# Patient Record
Sex: FEMALE | Race: WHITE | NOT HISPANIC OR LATINO | Employment: FULL TIME | URBAN - METROPOLITAN AREA
[De-identification: names, ages, dates, MRNs, and addresses within clinical notes are randomized per-mention and may not be internally consistent; named-entity substitution may affect disease eponyms.]

---

## 2022-04-08 LAB — PAP RECOMMENDATION EXT: NORMAL

## 2022-10-31 ENCOUNTER — HOSPITAL ENCOUNTER (OUTPATIENT)
Dept: RADIOLOGY | Facility: HOSPITAL | Age: 27
Discharge: HOME | End: 2022-10-31
Attending: NURSE PRACTITIONER
Payer: COMMERCIAL

## 2022-10-31 ENCOUNTER — OFFICE VISIT (OUTPATIENT)
Dept: FAMILY MEDICINE | Facility: CLINIC | Age: 27
End: 2022-10-31
Payer: COMMERCIAL

## 2022-10-31 VITALS
HEIGHT: 62 IN | SYSTOLIC BLOOD PRESSURE: 113 MMHG | TEMPERATURE: 98 F | HEART RATE: 87 BPM | DIASTOLIC BLOOD PRESSURE: 76 MMHG | BODY MASS INDEX: 39.78 KG/M2 | WEIGHT: 216.19 LBS

## 2022-10-31 DIAGNOSIS — Z00.00 ANNUAL PHYSICAL EXAM: Primary | ICD-10-CM

## 2022-10-31 DIAGNOSIS — E28.2 PCOS (POLYCYSTIC OVARIAN SYNDROME): ICD-10-CM

## 2022-10-31 DIAGNOSIS — Z11.4 ENCOUNTER FOR SCREENING FOR HIV: ICD-10-CM

## 2022-10-31 DIAGNOSIS — E01.0 THYROMEGALY: ICD-10-CM

## 2022-10-31 DIAGNOSIS — Z11.59 NEED FOR HEPATITIS C SCREENING TEST: ICD-10-CM

## 2022-10-31 PROCEDURE — 3074F SYST BP LT 130 MM HG: CPT | Mod: CPTII,S$GLB,, | Performed by: NURSE PRACTITIONER

## 2022-10-31 PROCEDURE — 76536 US EXAM OF HEAD AND NECK: CPT | Mod: TC,PO

## 2022-10-31 PROCEDURE — 1160F PR REVIEW ALL MEDS BY PRESCRIBER/CLIN PHARMACIST DOCUMENTED: ICD-10-PCS | Mod: CPTII,S$GLB,, | Performed by: NURSE PRACTITIONER

## 2022-10-31 PROCEDURE — 99999 PR PBB SHADOW E&M-NEW PATIENT-LVL IV: ICD-10-PCS | Mod: PBBFAC,,, | Performed by: NURSE PRACTITIONER

## 2022-10-31 PROCEDURE — 3074F PR MOST RECENT SYSTOLIC BLOOD PRESSURE < 130 MM HG: ICD-10-PCS | Mod: CPTII,S$GLB,, | Performed by: NURSE PRACTITIONER

## 2022-10-31 PROCEDURE — 99999 PR PBB SHADOW E&M-NEW PATIENT-LVL IV: CPT | Mod: PBBFAC,,, | Performed by: NURSE PRACTITIONER

## 2022-10-31 PROCEDURE — 76536 US SOFT TISSUE HEAD NECK THYROID: ICD-10-PCS | Mod: 26,,, | Performed by: RADIOLOGY

## 2022-10-31 PROCEDURE — 1160F RVW MEDS BY RX/DR IN RCRD: CPT | Mod: CPTII,S$GLB,, | Performed by: NURSE PRACTITIONER

## 2022-10-31 PROCEDURE — 99385 PREV VISIT NEW AGE 18-39: CPT | Mod: S$GLB,,, | Performed by: NURSE PRACTITIONER

## 2022-10-31 PROCEDURE — 99385 PR PREVENTIVE VISIT,NEW,18-39: ICD-10-PCS | Mod: S$GLB,,, | Performed by: NURSE PRACTITIONER

## 2022-10-31 PROCEDURE — 3078F DIAST BP <80 MM HG: CPT | Mod: CPTII,S$GLB,, | Performed by: NURSE PRACTITIONER

## 2022-10-31 PROCEDURE — 1159F MED LIST DOCD IN RCRD: CPT | Mod: CPTII,S$GLB,, | Performed by: NURSE PRACTITIONER

## 2022-10-31 PROCEDURE — 1159F PR MEDICATION LIST DOCUMENTED IN MEDICAL RECORD: ICD-10-PCS | Mod: CPTII,S$GLB,, | Performed by: NURSE PRACTITIONER

## 2022-10-31 PROCEDURE — 76536 US EXAM OF HEAD AND NECK: CPT | Mod: 26,,, | Performed by: RADIOLOGY

## 2022-10-31 PROCEDURE — 3078F PR MOST RECENT DIASTOLIC BLOOD PRESSURE < 80 MM HG: ICD-10-PCS | Mod: CPTII,S$GLB,, | Performed by: NURSE PRACTITIONER

## 2022-10-31 RX ORDER — NORETHINDRONE ACETATE AND ETHINYL ESTRADIOL AND FERROUS FUMARATE 1MG-20(24)
1 KIT ORAL DAILY
COMMUNITY

## 2022-10-31 RX ORDER — LEVOCETIRIZINE DIHYDROCHLORIDE 5 MG/1
TABLET, FILM COATED ORAL
COMMUNITY
End: 2023-03-20 | Stop reason: SDUPTHER

## 2022-10-31 NOTE — PATIENT INSTRUCTIONS
[facility] est care visit with Dr. Jaquez  [facility] well woman exam with ob/gyn  RTC as needed

## 2022-10-31 NOTE — PROGRESS NOTES
"Subjective:       Patient ID: Alyson Vila is a 26 y.o. female.    Chief Complaint: Annual Exam  Pt in today for annual exam, est care. Pt states sees ob/gyn for well woman exams; states diagnosed with PCOS; also states checking for insulin resistance. Labs due; request thyroid testing; states, "I'm pretty sure I have hypothyroidism. I'd like a thyroid panel, not just the TSH." Pt declines influenza vaccine. Pt has no other complaints today.  Past Medical History:   Diagnosis Date    PCOS (polycystic ovarian syndrome)      Social History     Socioeconomic History    Marital status: Single   Tobacco Use    Smoking status: Never   Substance and Sexual Activity    Alcohol use: Not Currently    Drug use: Not Currently    Sexual activity: Yes     Partners: Male     History reviewed. No pertinent surgical history.'  HPI  Review of Systems   Constitutional: Negative.    HENT: Negative.     Eyes: Negative.    Respiratory: Negative.     Cardiovascular: Negative.    Gastrointestinal: Negative.    Endocrine: Negative.    Genitourinary: Negative.    Musculoskeletal: Negative.    Integumentary:  Negative.   Allergic/Immunologic: Negative.    Neurological: Negative.    Psychiatric/Behavioral: Negative.         Objective:      Physical Exam  Vitals and nursing note reviewed.   Constitutional:       Appearance: Normal appearance.   HENT:      Head: Normocephalic.      Right Ear: Tympanic membrane, ear canal and external ear normal.      Left Ear: Tympanic membrane, ear canal and external ear normal.      Nose: Nose normal.      Mouth/Throat:      Mouth: Mucous membranes are moist.      Pharynx: Oropharynx is clear.   Eyes:      Conjunctiva/sclera: Conjunctivae normal.      Pupils: Pupils are equal, round, and reactive to light.   Neck:      Thyroid: Thyromegaly present. No thyroid mass or thyroid tenderness.   Cardiovascular:      Rate and Rhythm: Normal rate and regular rhythm.      Pulses: Normal pulses.      Heart sounds: " Normal heart sounds.   Pulmonary:      Effort: Pulmonary effort is normal.      Breath sounds: Normal breath sounds.   Abdominal:      General: Bowel sounds are normal.      Palpations: Abdomen is soft.   Musculoskeletal:         General: Normal range of motion.      Cervical back: Normal range of motion and neck supple.   Skin:     General: Skin is warm and dry.      Capillary Refill: Capillary refill takes 2 to 3 seconds.   Neurological:      Mental Status: She is alert and oriented to person, place, and time.   Psychiatric:         Mood and Affect: Mood normal.         Behavior: Behavior normal.         Thought Content: Thought content normal.         Judgment: Judgment normal.       Assessment:       Problem List Items Addressed This Visit    None  Visit Diagnoses       Annual physical exam    -  Primary    Relevant Orders    TSH    T4, FREE    CBC Without Differential    Comprehensive Metabolic Panel    Lipid Panel    Hemoglobin A1C    PCOS (polycystic ovarian syndrome)        Relevant Orders    Hemoglobin A1C    Need for hepatitis C screening test        Relevant Orders    Hepatitis C Antibody    Thyromegaly        Relevant Orders    TSH    T4, FREE    T3    Encounter for screening for HIV        Relevant Orders    HIV 1/2 Ag/Ab (4th Gen)              Plan:           Alyson SANTOS was seen today for annual exam.    Diagnoses and all orders for this visit:    Annual physical exam  PCOS (polycystic ovarian syndrome)  Thyromegaly  Need for hepatitis C screening test  Encounter for screening for HIV  -     Hepatitis C Antibody; Future  -     TSH; Future  -     T4, FREE; Future  -     CBC Without Differential; Future  -     Comprehensive Metabolic Panel; Future  -     Lipid Panel; Future  -     Hemoglobin A1C; Future        -     T3; Future  -     US Soft Tissue Head Neck Thyroid; Future        -     HIV 1/2 Ag/Ab (4th Gen); Future  [facility] est care visit with Dr. Gisele Bernal well woman exam with ob/gyn  RTC as  needed

## 2022-11-02 ENCOUNTER — TELEPHONE (OUTPATIENT)
Dept: FAMILY MEDICINE | Facility: CLINIC | Age: 27
End: 2022-11-02
Payer: COMMERCIAL

## 2022-11-02 DIAGNOSIS — E04.1 THYROID NODULE: ICD-10-CM

## 2022-11-02 DIAGNOSIS — R93.89 THYROID WITH HETEROGENEOUS ECHOTEXTURE DETERMINED BY ULTRASOUND: Primary | ICD-10-CM

## 2022-11-02 NOTE — TELEPHONE ENCOUNTER
----- Message from Trinidad Snowden NP sent at 11/1/2022  3:29 PM CDT -----  Reviewed; heterogeneity of thyroid noted, which can indicate thyroid disease; awaiting thyroid lab results; consider endocrinology. Nodules noted to left and right lobes; per radiology, none meet FNA criteria. Recommend monitoring yearly, if not underlying disease noted.

## 2022-11-04 ENCOUNTER — LAB VISIT (OUTPATIENT)
Dept: LAB | Facility: HOSPITAL | Age: 27
End: 2022-11-04
Attending: NURSE PRACTITIONER
Payer: COMMERCIAL

## 2022-11-04 ENCOUNTER — OFFICE VISIT (OUTPATIENT)
Dept: FAMILY MEDICINE | Facility: CLINIC | Age: 27
End: 2022-11-04
Payer: COMMERCIAL

## 2022-11-04 VITALS
HEART RATE: 70 BPM | SYSTOLIC BLOOD PRESSURE: 125 MMHG | TEMPERATURE: 97 F | HEIGHT: 62 IN | DIASTOLIC BLOOD PRESSURE: 80 MMHG | WEIGHT: 215.06 LBS | BODY MASS INDEX: 39.58 KG/M2

## 2022-11-04 DIAGNOSIS — E28.2 PCOS (POLYCYSTIC OVARIAN SYNDROME): Primary | ICD-10-CM

## 2022-11-04 DIAGNOSIS — E28.2 PCOS (POLYCYSTIC OVARIAN SYNDROME): ICD-10-CM

## 2022-11-04 DIAGNOSIS — Z00.00 ANNUAL PHYSICAL EXAM: ICD-10-CM

## 2022-11-04 DIAGNOSIS — Z11.4 ENCOUNTER FOR SCREENING FOR HIV: ICD-10-CM

## 2022-11-04 DIAGNOSIS — E01.0 THYROMEGALY: ICD-10-CM

## 2022-11-04 DIAGNOSIS — Z11.59 NEED FOR HEPATITIS C SCREENING TEST: ICD-10-CM

## 2022-11-04 DIAGNOSIS — E04.2 MULTIPLE THYROID NODULES: ICD-10-CM

## 2022-11-04 LAB
ALBUMIN SERPL BCP-MCNC: 3.5 G/DL (ref 3.5–5.2)
ALP SERPL-CCNC: 53 U/L (ref 55–135)
ALT SERPL W/O P-5'-P-CCNC: 22 U/L (ref 10–44)
ANION GAP SERPL CALC-SCNC: 9 MMOL/L (ref 8–16)
AST SERPL-CCNC: 20 U/L (ref 10–40)
BILIRUB SERPL-MCNC: 0.5 MG/DL (ref 0.1–1)
BUN SERPL-MCNC: 11 MG/DL (ref 6–20)
CALCIUM SERPL-MCNC: 9.6 MG/DL (ref 8.7–10.5)
CHLORIDE SERPL-SCNC: 105 MMOL/L (ref 95–110)
CHOLEST SERPL-MCNC: 189 MG/DL (ref 120–199)
CHOLEST/HDLC SERPL: 3 {RATIO} (ref 2–5)
CO2 SERPL-SCNC: 23 MMOL/L (ref 23–29)
CREAT SERPL-MCNC: 0.9 MG/DL (ref 0.5–1.4)
ERYTHROCYTE [DISTWIDTH] IN BLOOD BY AUTOMATED COUNT: 12 % (ref 11.5–14.5)
EST. GFR  (NO RACE VARIABLE): >60 ML/MIN/1.73 M^2
ESTIMATED AVG GLUCOSE: 108 MG/DL (ref 68–131)
GLUCOSE SERPL-MCNC: 89 MG/DL (ref 70–110)
HBA1C MFR BLD: 5.4 % (ref 4–5.6)
HCT VFR BLD AUTO: 40.2 % (ref 37–48.5)
HCV AB SERPL QL IA: NORMAL
HDLC SERPL-MCNC: 62 MG/DL (ref 40–75)
HDLC SERPL: 32.8 % (ref 20–50)
HGB BLD-MCNC: 13.1 G/DL (ref 12–16)
HIV 1+2 AB+HIV1 P24 AG SERPL QL IA: NORMAL
INSULIN COLLECTION INTERVAL: NORMAL
INSULIN SERPL-ACNC: 11.8 UU/ML
LDLC SERPL CALC-MCNC: 109.6 MG/DL (ref 63–159)
MCH RBC QN AUTO: 31.3 PG (ref 27–31)
MCHC RBC AUTO-ENTMCNC: 32.6 G/DL (ref 32–36)
MCV RBC AUTO: 96 FL (ref 82–98)
NONHDLC SERPL-MCNC: 127 MG/DL
PLATELET # BLD AUTO: 312 K/UL (ref 150–450)
PMV BLD AUTO: 10.8 FL (ref 9.2–12.9)
POTASSIUM SERPL-SCNC: 4.4 MMOL/L (ref 3.5–5.1)
PROT SERPL-MCNC: 7.3 G/DL (ref 6–8.4)
RBC # BLD AUTO: 4.19 M/UL (ref 4–5.4)
SODIUM SERPL-SCNC: 137 MMOL/L (ref 136–145)
T3 SERPL-MCNC: 169 NG/DL (ref 60–180)
T4 FREE SERPL-MCNC: 0.92 NG/DL (ref 0.71–1.51)
TRIGL SERPL-MCNC: 87 MG/DL (ref 30–150)
TSH SERPL DL<=0.005 MIU/L-ACNC: 0.66 UIU/ML (ref 0.4–4)
WBC # BLD AUTO: 5.74 K/UL (ref 3.9–12.7)

## 2022-11-04 PROCEDURE — 87389 HIV-1 AG W/HIV-1&-2 AB AG IA: CPT | Performed by: NURSE PRACTITIONER

## 2022-11-04 PROCEDURE — 86803 HEPATITIS C AB TEST: CPT | Performed by: NURSE PRACTITIONER

## 2022-11-04 PROCEDURE — 83036 HEMOGLOBIN GLYCOSYLATED A1C: CPT | Performed by: NURSE PRACTITIONER

## 2022-11-04 PROCEDURE — 1159F MED LIST DOCD IN RCRD: CPT | Mod: CPTII,S$GLB,, | Performed by: INTERNAL MEDICINE

## 2022-11-04 PROCEDURE — 3079F PR MOST RECENT DIASTOLIC BLOOD PRESSURE 80-89 MM HG: ICD-10-PCS | Mod: CPTII,S$GLB,, | Performed by: INTERNAL MEDICINE

## 2022-11-04 PROCEDURE — 84480 ASSAY TRIIODOTHYRONINE (T3): CPT | Performed by: NURSE PRACTITIONER

## 2022-11-04 PROCEDURE — 3074F SYST BP LT 130 MM HG: CPT | Mod: CPTII,S$GLB,, | Performed by: INTERNAL MEDICINE

## 2022-11-04 PROCEDURE — 3008F PR BODY MASS INDEX (BMI) DOCUMENTED: ICD-10-PCS | Mod: CPTII,S$GLB,, | Performed by: INTERNAL MEDICINE

## 2022-11-04 PROCEDURE — 99999 PR PBB SHADOW E&M-EST. PATIENT-LVL III: ICD-10-PCS | Mod: PBBFAC,,, | Performed by: INTERNAL MEDICINE

## 2022-11-04 PROCEDURE — 80061 LIPID PANEL: CPT | Performed by: NURSE PRACTITIONER

## 2022-11-04 PROCEDURE — 80053 COMPREHEN METABOLIC PANEL: CPT | Performed by: NURSE PRACTITIONER

## 2022-11-04 PROCEDURE — 3079F DIAST BP 80-89 MM HG: CPT | Mod: CPTII,S$GLB,, | Performed by: INTERNAL MEDICINE

## 2022-11-04 PROCEDURE — 99999 PR PBB SHADOW E&M-EST. PATIENT-LVL III: CPT | Mod: PBBFAC,,, | Performed by: INTERNAL MEDICINE

## 2022-11-04 PROCEDURE — 3008F BODY MASS INDEX DOCD: CPT | Mod: CPTII,S$GLB,, | Performed by: INTERNAL MEDICINE

## 2022-11-04 PROCEDURE — 84443 ASSAY THYROID STIM HORMONE: CPT | Performed by: NURSE PRACTITIONER

## 2022-11-04 PROCEDURE — 83525 ASSAY OF INSULIN: CPT | Performed by: INTERNAL MEDICINE

## 2022-11-04 PROCEDURE — 3074F PR MOST RECENT SYSTOLIC BLOOD PRESSURE < 130 MM HG: ICD-10-PCS | Mod: CPTII,S$GLB,, | Performed by: INTERNAL MEDICINE

## 2022-11-04 PROCEDURE — 1159F PR MEDICATION LIST DOCUMENTED IN MEDICAL RECORD: ICD-10-PCS | Mod: CPTII,S$GLB,, | Performed by: INTERNAL MEDICINE

## 2022-11-04 PROCEDURE — 99213 OFFICE O/P EST LOW 20 MIN: CPT | Mod: S$GLB,,, | Performed by: INTERNAL MEDICINE

## 2022-11-04 PROCEDURE — 99213 PR OFFICE/OUTPT VISIT, EST, LEVL III, 20-29 MIN: ICD-10-PCS | Mod: S$GLB,,, | Performed by: INTERNAL MEDICINE

## 2022-11-04 PROCEDURE — 84439 ASSAY OF FREE THYROXINE: CPT | Performed by: NURSE PRACTITIONER

## 2022-11-04 PROCEDURE — 85027 COMPLETE CBC AUTOMATED: CPT | Performed by: NURSE PRACTITIONER

## 2022-11-04 PROCEDURE — 36415 COLL VENOUS BLD VENIPUNCTURE: CPT | Mod: PO | Performed by: NURSE PRACTITIONER

## 2022-11-04 NOTE — PROGRESS NOTES
Assessment/Plan:    Problem List Items Addressed This Visit          Endocrine    Multiple thyroid nodules    Overview     -US Nov 2022- multiple nodules, none meeting FNA criteria  -thyroid studies pending         PCOS (polycystic ovarian syndrome) - Primary    Overview     -followed by GYN  -currently on OCP         Relevant Orders    INSULIN, RANDOM         Irasema Jaquez MD  _____________________________________________________________________________________________________________________________________________________    CC: establish care    HPI:    Patient is in clinic today as a new patient to me. She is a 25 yo F with a PMH of PCOS and newly diagnosed thyroid nodules.    Recently had annual exam done by one of my partners. Patient was interested in being checked for insulin resistance and thyroid disease. She has a diagnosis of PCOS. She has labs scheduled for today. She did have a thyroid US performed and found to have multiple thyroid nodules but none meeting biopsy criteria.    No other new complaints today.  Remaining chronic conditions have been reviewed and remain stable. Further detail as stated above.     HM reviewed. DENISE sent for pap.    No recent changes to medical/surgical history.    Current Outpatient Medications on File Prior to Visit   Medication Sig Dispense Refill    levocetirizine (XYZAL) 5 MG tablet levocetirizine 5 mg tablet      norethindrone-e.estradioL-iron (MINASTRIN 24 FE) 1 mg-20 mcg(24) /75 mg (4) Chew Take 1 tablet by mouth once daily.       No current facility-administered medications on file prior to visit.       Review of Systems   Constitutional:  Negative for chills, diaphoresis, fatigue and fever.   HENT:  Negative for congestion, ear pain, postnasal drip, sinus pain and sore throat.    Eyes:  Negative for pain and redness.   Respiratory:  Negative for cough, chest tightness and shortness of breath.    Cardiovascular:  Negative for chest pain and leg swelling.  "  Gastrointestinal:  Negative for abdominal pain, constipation, diarrhea, nausea and vomiting.   Genitourinary:  Negative for dysuria and hematuria.   Musculoskeletal:  Negative for arthralgias and joint swelling.   Skin:  Negative for rash.   Neurological:  Negative for dizziness, syncope and headaches.   Psychiatric/Behavioral:  Negative for dysphoric mood. The patient is not nervous/anxious.      Vitals:    11/04/22 1011   BP: 125/80   Pulse: 70   Temp: 97.3 °F (36.3 °C)   TempSrc: Oral   Weight: 97.6 kg (215 lb 0.9 oz)   Height: 5' 2" (1.575 m)       Wt Readings from Last 3 Encounters:   11/04/22 97.6 kg (215 lb 0.9 oz)   10/31/22 98.1 kg (216 lb 2.6 oz)   07/11/13 73.9 kg (163 lb) (92 %, Z= 1.42)*     * Growth percentiles are based on CDC (Girls, 2-20 Years) data.       Physical Exam  Constitutional:       General: She is not in acute distress.     Appearance: Normal appearance. She is well-developed.   HENT:      Head: Normocephalic and atraumatic.   Eyes:      Conjunctiva/sclera: Conjunctivae normal.   Cardiovascular:      Rate and Rhythm: Normal rate and regular rhythm.      Pulses: Normal pulses.      Heart sounds: Normal heart sounds. No murmur heard.  Pulmonary:      Effort: Pulmonary effort is normal. No respiratory distress.      Breath sounds: Normal breath sounds.   Abdominal:      General: Bowel sounds are normal. There is no distension.      Palpations: Abdomen is soft.      Tenderness: There is no abdominal tenderness.   Musculoskeletal:         General: Normal range of motion.      Cervical back: Normal range of motion and neck supple.   Skin:     General: Skin is warm and dry.      Findings: No rash.   Neurological:      General: No focal deficit present.      Mental Status: She is alert and oriented to person, place, and time.   Psychiatric:         Mood and Affect: Mood normal.         Behavior: Behavior normal.     Health Maintenance   Topic Date Due    Hepatitis C Screening  Never done    Pap " Smear  Never done    TETANUS VACCINE  06/07/2031    Lipid Panel  Completed    HPV Vaccines  Completed

## 2022-11-07 NOTE — PROGRESS NOTES
Results have been released via [email or web address]. Please verify that these have been viewed by patient. If not, please call patient with results.    I have reviewed your recent results.    A1C NORMAL-The A1c is normal. Your insulin level is normal as well. No evidence of prediabetes or diabetes.     CBC NORMAL-The CBC appears to be stable at this time with no sign of major anemia, abnormal white count or platelet abnormality.    CMP/BMP NORMAL-The electrolytes all appear stable at this time.  This includes kidney functions along with routine electrolytes like sugar, potassium and sodium.  If it was a CMP test, the liver enzymes were noted to be stable also.    HEPATITIS C SCREEN NORMAL-The routine screening for Hepatitis C was negative.    HIV SCREEN NORMAL-The screening for HIV is negative.    LIPID NORMAL SCREEN-The cholesterol panel screening showed levels that are considered at target at this time.  Recheck each year.     TSH NORMAL-The TSH screening indicated a normal function of the thyroid.    Please let me know if you have any additional questions or concerns about above.

## 2022-11-09 ENCOUNTER — PATIENT OUTREACH (OUTPATIENT)
Dept: ADMINISTRATIVE | Facility: HOSPITAL | Age: 27
End: 2022-11-09
Payer: COMMERCIAL

## 2022-11-10 ENCOUNTER — OFFICE VISIT (OUTPATIENT)
Dept: ENDOCRINOLOGY | Facility: CLINIC | Age: 27
End: 2022-11-10
Payer: COMMERCIAL

## 2022-11-10 ENCOUNTER — LAB VISIT (OUTPATIENT)
Dept: LAB | Facility: HOSPITAL | Age: 27
End: 2022-11-10
Attending: INTERNAL MEDICINE
Payer: COMMERCIAL

## 2022-11-10 VITALS
HEIGHT: 62 IN | SYSTOLIC BLOOD PRESSURE: 118 MMHG | BODY MASS INDEX: 39.07 KG/M2 | WEIGHT: 212.31 LBS | HEART RATE: 78 BPM | DIASTOLIC BLOOD PRESSURE: 76 MMHG | OXYGEN SATURATION: 99 %

## 2022-11-10 DIAGNOSIS — E28.2 PCOS (POLYCYSTIC OVARIAN SYNDROME): ICD-10-CM

## 2022-11-10 DIAGNOSIS — E04.1 THYROID NODULE: ICD-10-CM

## 2022-11-10 DIAGNOSIS — R93.89 THYROID WITH HETEROGENEOUS ECHOTEXTURE DETERMINED BY ULTRASOUND: ICD-10-CM

## 2022-11-10 DIAGNOSIS — R93.89 THYROID WITH HETEROGENEOUS ECHOTEXTURE DETERMINED BY ULTRASOUND: Primary | ICD-10-CM

## 2022-11-10 DIAGNOSIS — E04.2 MULTIPLE THYROID NODULES: ICD-10-CM

## 2022-11-10 LAB
GLUCOSE SERPL-MCNC: 115 MG/DL
GLUCOSE SERPL-MCNC: 218 MG/DL
GLUCOSE SERPL-MCNC: 97 MG/DL (ref 70–110)
THYROPEROXIDASE IGG SERPL-ACNC: 733.8 IU/ML

## 2022-11-10 PROCEDURE — 99999 PR PBB SHADOW E&M-EST. PATIENT-LVL III: CPT | Mod: PBBFAC,,, | Performed by: INTERNAL MEDICINE

## 2022-11-10 PROCEDURE — 3044F HG A1C LEVEL LT 7.0%: CPT | Mod: CPTII,S$GLB,, | Performed by: INTERNAL MEDICINE

## 2022-11-10 PROCEDURE — 1160F PR REVIEW ALL MEDS BY PRESCRIBER/CLIN PHARMACIST DOCUMENTED: ICD-10-PCS | Mod: CPTII,S$GLB,, | Performed by: INTERNAL MEDICINE

## 2022-11-10 PROCEDURE — 3078F DIAST BP <80 MM HG: CPT | Mod: CPTII,S$GLB,, | Performed by: INTERNAL MEDICINE

## 2022-11-10 PROCEDURE — 99204 OFFICE O/P NEW MOD 45 MIN: CPT | Mod: S$GLB,,, | Performed by: INTERNAL MEDICINE

## 2022-11-10 PROCEDURE — 1160F RVW MEDS BY RX/DR IN RCRD: CPT | Mod: CPTII,S$GLB,, | Performed by: INTERNAL MEDICINE

## 2022-11-10 PROCEDURE — 86376 MICROSOMAL ANTIBODY EACH: CPT | Performed by: INTERNAL MEDICINE

## 2022-11-10 PROCEDURE — 99999 PR PBB SHADOW E&M-EST. PATIENT-LVL III: ICD-10-PCS | Mod: PBBFAC,,, | Performed by: INTERNAL MEDICINE

## 2022-11-10 PROCEDURE — 3078F PR MOST RECENT DIASTOLIC BLOOD PRESSURE < 80 MM HG: ICD-10-PCS | Mod: CPTII,S$GLB,, | Performed by: INTERNAL MEDICINE

## 2022-11-10 PROCEDURE — 3074F PR MOST RECENT SYSTOLIC BLOOD PRESSURE < 130 MM HG: ICD-10-PCS | Mod: CPTII,S$GLB,, | Performed by: INTERNAL MEDICINE

## 2022-11-10 PROCEDURE — 3074F SYST BP LT 130 MM HG: CPT | Mod: CPTII,S$GLB,, | Performed by: INTERNAL MEDICINE

## 2022-11-10 PROCEDURE — 3008F PR BODY MASS INDEX (BMI) DOCUMENTED: ICD-10-PCS | Mod: CPTII,S$GLB,, | Performed by: INTERNAL MEDICINE

## 2022-11-10 PROCEDURE — 82951 GLUCOSE TOLERANCE TEST (GTT): CPT | Performed by: INTERNAL MEDICINE

## 2022-11-10 PROCEDURE — 3008F BODY MASS INDEX DOCD: CPT | Mod: CPTII,S$GLB,, | Performed by: INTERNAL MEDICINE

## 2022-11-10 PROCEDURE — 99204 PR OFFICE/OUTPT VISIT, NEW, LEVL IV, 45-59 MIN: ICD-10-PCS | Mod: S$GLB,,, | Performed by: INTERNAL MEDICINE

## 2022-11-10 PROCEDURE — 3044F PR MOST RECENT HEMOGLOBIN A1C LEVEL <7.0%: ICD-10-PCS | Mod: CPTII,S$GLB,, | Performed by: INTERNAL MEDICINE

## 2022-11-10 PROCEDURE — 1159F MED LIST DOCD IN RCRD: CPT | Mod: CPTII,S$GLB,, | Performed by: INTERNAL MEDICINE

## 2022-11-10 PROCEDURE — 36415 COLL VENOUS BLD VENIPUNCTURE: CPT | Performed by: INTERNAL MEDICINE

## 2022-11-10 PROCEDURE — 1159F PR MEDICATION LIST DOCUMENTED IN MEDICAL RECORD: ICD-10-PCS | Mod: CPTII,S$GLB,, | Performed by: INTERNAL MEDICINE

## 2022-11-10 NOTE — ASSESSMENT & PLAN NOTE
Labs and imaging not available and now on OCP  Will obtain OGTT to evaluate for need for metformin although she reports improvement in insulin resistance following weight loss in last year  No other hyperandrogenism symptoms present

## 2022-11-10 NOTE — ASSESSMENT & PLAN NOTE
US reviewed and with heterogeneous texture and few subcentimeter nodules  Will check TPO antibody although TSH normal. Discussed implications of +TPO and need for monitoring for development of hypothyroidism  Update US 1 year and if stable may not need further imaging for some time

## 2022-11-10 NOTE — PROGRESS NOTES
Alyson Vila is a 26 y.o. female  referred by Triindad Snowden for evaluation of thyroid nodule    History of Present Illness  Thyroid nodule(s)  Found when she presented with concerns regarding weight loss. Thyroid function normal, US as below  No antibodies have been done    Thyroid US 10/2022:  There is diffuse heterogeneity of the thyroid gland.  The isthmus measures 4 mm in thickness.  The right lobe of the thyroid gland measures 5.3 cm in length.  The left lobe of the thyroid gland measures 5.1 cm in length.     Tiny 3 mm cystic nodule seen within the midpole of the right lobe.  There is also a 6 mm isoechoic solid nodule within the lower pole of the right lobe.  The left lobe of the thyroid gland demonstrates a 9 mm isoechoic nodule within the lower pole.     Impression:     1. Diffuse heterogeneity of the thyroid gland.  2. No nodules meeting criteria for FNA or follow-up per TI rads criteria       Risk Factors for Thyroid Cancer:  Hx of External Beam Radiation:denies  Family Hx of Thyroid Cancer:denies    Denies rapid neck enlargement, difficulty swallowing, SOB, or hoarseness.     There is no known disorder of thyroid function.  Recent TSH:    Lab Results   Component Value Date    TSH 0.657 11/04/2022     Has diagnosis of PCOS made after sister diagnosed. She blood work and US done and told consistent. I do not have this to review today    Cycles have been painful and inconsistent in duration. Maybe skipped one month a time but not more than that  She started OCP last year after diagnosis and great improvement.  Denies acne, hirsutism    Also told she has insulin resistance although weight loss in last year led to some improvement  Has not had OGTT  Metformin discussed but not started    JEAN CARLOS-IR 2.6 on labs 11/2022    Last year has been working to lose weight and lost 20 pounds with diet and exercise      Current Outpatient Medications:     levocetirizine (XYZAL) 5 MG tablet, levocetirizine 5 mg  tablet, Disp: , Rfl:     norethindrone-e.estradioL-iron (MINASTRIN 24 FE) 1 mg-20 mcg(24) /75 mg (4) Chew, Take 1 tablet by mouth once daily., Disp: , Rfl:     ROS as above    Objective:     Vitals:    11/10/22 1101   BP: 118/76   Pulse: 78     Wt Readings from Last 3 Encounters:   11/10/22 96.3 kg (212 lb 4.9 oz)   11/04/22 97.6 kg (215 lb 0.9 oz)   10/31/22 98.1 kg (216 lb 2.6 oz)     Body mass index is 38.83 kg/m².  Physical Exam  Constitutional:       Appearance: She is well-developed.   HENT:      Head: Normocephalic.   Eyes:      Conjunctiva/sclera: Conjunctivae normal.   Neck:      Comments: Mild thyromegaly  Pulmonary:      Effort: Pulmonary effort is normal.   Musculoskeletal:         General: Normal range of motion.   Skin:     General: Skin is warm.      Findings: No rash.      Comments: No hirsutism  No acanthosis  No acne   Neurological:      Mental Status: She is alert and oriented to person, place, and time.       Labs    Chemistry        Component Value Date/Time     11/04/2022 1045    K 4.4 11/04/2022 1045     11/04/2022 1045    CO2 23 11/04/2022 1045    BUN 11 11/04/2022 1045    CREATININE 0.9 11/04/2022 1045    GLU 89 11/04/2022 1045        Component Value Date/Time    CALCIUM 9.6 11/04/2022 1045    ALKPHOS 53 (L) 11/04/2022 1045    AST 20 11/04/2022 1045    ALT 22 11/04/2022 1045    BILITOT 0.5 11/04/2022 1045              Assessment and Plan     Multiple thyroid nodules  US reviewed and with heterogeneous texture and few subcentimeter nodules  Will check TPO antibody although TSH normal. Discussed implications of +TPO and need for monitoring for development of hypothyroidism  Update US 1 year and if stable may not need further imaging for some time    PCOS (polycystic ovarian syndrome)  Labs and imaging not available and now on OCP  Will obtain OGTT to evaluate for need for metformin although she reports improvement in insulin resistance following weight loss in last year  No other  hyperandrogenism symptoms present    BMI 38.0-38.9,adult  Cont efforts at weight loss as she is doing  Evaluation of insulin resistance as above        RTC 1 year with US        Clover Navarrete MD

## 2022-11-15 ENCOUNTER — PATIENT MESSAGE (OUTPATIENT)
Dept: ENDOCRINOLOGY | Facility: CLINIC | Age: 27
End: 2022-11-15
Payer: COMMERCIAL

## 2022-11-15 RX ORDER — METFORMIN HYDROCHLORIDE 500 MG/1
1000 TABLET, EXTENDED RELEASE ORAL 2 TIMES DAILY WITH MEALS
Qty: 360 TABLET | Refills: 3 | Status: SHIPPED | OUTPATIENT
Start: 2022-11-15 | End: 2023-03-10

## 2023-01-13 ENCOUNTER — HOSPITAL ENCOUNTER (OUTPATIENT)
Dept: RADIOLOGY | Facility: HOSPITAL | Age: 28
Discharge: HOME | End: 2023-01-13
Attending: NURSE PRACTITIONER
Payer: COMMERCIAL

## 2023-01-13 ENCOUNTER — OFFICE VISIT (OUTPATIENT)
Dept: FAMILY MEDICINE | Facility: CLINIC | Age: 28
End: 2023-01-13
Payer: COMMERCIAL

## 2023-01-13 VITALS
SYSTOLIC BLOOD PRESSURE: 120 MMHG | RESPIRATION RATE: 20 BRPM | DIASTOLIC BLOOD PRESSURE: 81 MMHG | WEIGHT: 214.94 LBS | OXYGEN SATURATION: 99 % | TEMPERATURE: 99 F | HEIGHT: 62 IN | HEART RATE: 87 BPM | BODY MASS INDEX: 39.56 KG/M2

## 2023-01-13 DIAGNOSIS — R06.2 WHEEZING: ICD-10-CM

## 2023-01-13 DIAGNOSIS — J06.9 UPPER RESPIRATORY TRACT INFECTION, UNSPECIFIED TYPE: Primary | ICD-10-CM

## 2023-01-13 DIAGNOSIS — R05.9 COUGH, UNSPECIFIED TYPE: ICD-10-CM

## 2023-01-13 DIAGNOSIS — R09.81 NASAL CONGESTION: ICD-10-CM

## 2023-01-13 LAB
CTP QC/QA: YES
CTP QC/QA: YES
FLUAV AG NPH QL: NEGATIVE
FLUBV AG NPH QL: NEGATIVE
SARS-COV-2 RDRP RESP QL NAA+PROBE: NEGATIVE

## 2023-01-13 PROCEDURE — 87635: ICD-10-PCS | Mod: QW,S$GLB,, | Performed by: NURSE PRACTITIONER

## 2023-01-13 PROCEDURE — 99999 PR PBB SHADOW E&M-EST. PATIENT-LVL V: CPT | Mod: PBBFAC,,, | Performed by: NURSE PRACTITIONER

## 2023-01-13 PROCEDURE — 1160F PR REVIEW ALL MEDS BY PRESCRIBER/CLIN PHARMACIST DOCUMENTED: ICD-10-PCS | Mod: CPTII,S$GLB,, | Performed by: NURSE PRACTITIONER

## 2023-01-13 PROCEDURE — 87635 SARS-COV-2 COVID-19 AMP PRB: CPT | Mod: QW,S$GLB,, | Performed by: NURSE PRACTITIONER

## 2023-01-13 PROCEDURE — 1159F MED LIST DOCD IN RCRD: CPT | Mod: CPTII,S$GLB,, | Performed by: NURSE PRACTITIONER

## 2023-01-13 PROCEDURE — 3074F PR MOST RECENT SYSTOLIC BLOOD PRESSURE < 130 MM HG: ICD-10-PCS | Mod: CPTII,S$GLB,, | Performed by: NURSE PRACTITIONER

## 2023-01-13 PROCEDURE — 99999 PR PBB SHADOW E&M-EST. PATIENT-LVL V: ICD-10-PCS | Mod: PBBFAC,,, | Performed by: NURSE PRACTITIONER

## 2023-01-13 PROCEDURE — 3008F BODY MASS INDEX DOCD: CPT | Mod: CPTII,S$GLB,, | Performed by: NURSE PRACTITIONER

## 2023-01-13 PROCEDURE — 99213 OFFICE O/P EST LOW 20 MIN: CPT | Mod: S$GLB,,, | Performed by: NURSE PRACTITIONER

## 2023-01-13 PROCEDURE — 3079F DIAST BP 80-89 MM HG: CPT | Mod: CPTII,S$GLB,, | Performed by: NURSE PRACTITIONER

## 2023-01-13 PROCEDURE — 3074F SYST BP LT 130 MM HG: CPT | Mod: CPTII,S$GLB,, | Performed by: NURSE PRACTITIONER

## 2023-01-13 PROCEDURE — 87804 POCT INFLUENZA A/B: ICD-10-PCS | Mod: 59,QW,S$GLB, | Performed by: NURSE PRACTITIONER

## 2023-01-13 PROCEDURE — 71046 X-RAY EXAM CHEST 2 VIEWS: CPT | Mod: 26,,, | Performed by: RADIOLOGY

## 2023-01-13 PROCEDURE — 71046 X-RAY EXAM CHEST 2 VIEWS: CPT | Mod: TC,PO

## 2023-01-13 PROCEDURE — 1159F PR MEDICATION LIST DOCUMENTED IN MEDICAL RECORD: ICD-10-PCS | Mod: CPTII,S$GLB,, | Performed by: NURSE PRACTITIONER

## 2023-01-13 PROCEDURE — 3008F PR BODY MASS INDEX (BMI) DOCUMENTED: ICD-10-PCS | Mod: CPTII,S$GLB,, | Performed by: NURSE PRACTITIONER

## 2023-01-13 PROCEDURE — 1160F RVW MEDS BY RX/DR IN RCRD: CPT | Mod: CPTII,S$GLB,, | Performed by: NURSE PRACTITIONER

## 2023-01-13 PROCEDURE — 71046 XR CHEST PA AND LATERAL: ICD-10-PCS | Mod: 26,,, | Performed by: RADIOLOGY

## 2023-01-13 PROCEDURE — 99213 PR OFFICE/OUTPT VISIT, EST, LEVL III, 20-29 MIN: ICD-10-PCS | Mod: S$GLB,,, | Performed by: NURSE PRACTITIONER

## 2023-01-13 PROCEDURE — 87804 INFLUENZA ASSAY W/OPTIC: CPT | Mod: 59,QW,S$GLB, | Performed by: NURSE PRACTITIONER

## 2023-01-13 PROCEDURE — 3079F PR MOST RECENT DIASTOLIC BLOOD PRESSURE 80-89 MM HG: ICD-10-PCS | Mod: CPTII,S$GLB,, | Performed by: NURSE PRACTITIONER

## 2023-01-13 RX ORDER — ALBUTEROL SULFATE 90 UG/1
2 AEROSOL, METERED RESPIRATORY (INHALATION) EVERY 6 HOURS PRN
Qty: 18 G | Refills: 0 | Status: SHIPPED | OUTPATIENT
Start: 2023-01-13 | End: 2023-06-13

## 2023-01-13 RX ORDER — AZITHROMYCIN 250 MG/1
TABLET, FILM COATED ORAL
Qty: 6 TABLET | Refills: 0 | Status: CANCELLED | OUTPATIENT
Start: 2023-01-13 | End: 2023-01-18

## 2023-01-13 RX ORDER — METHYLPREDNISOLONE 4 MG/1
TABLET ORAL
Qty: 21 TABLET | Refills: 0 | Status: CANCELLED | OUTPATIENT
Start: 2023-01-13 | End: 2023-02-03

## 2023-01-13 RX ORDER — AZITHROMYCIN 250 MG/1
TABLET, FILM COATED ORAL
Qty: 6 TABLET | Refills: 0 | Status: SHIPPED | OUTPATIENT
Start: 2023-01-13 | End: 2023-01-18

## 2023-01-13 RX ORDER — PROMETHAZINE HYDROCHLORIDE AND DEXTROMETHORPHAN HYDROBROMIDE 6.25; 15 MG/5ML; MG/5ML
5 SYRUP ORAL 2 TIMES DAILY PRN
Qty: 118 ML | Refills: 0 | Status: SHIPPED | OUTPATIENT
Start: 2023-01-13 | End: 2023-01-23

## 2023-01-13 RX ORDER — METHYLPREDNISOLONE 4 MG/1
TABLET ORAL
Qty: 21 TABLET | Refills: 0 | Status: SHIPPED | OUTPATIENT
Start: 2023-01-13 | End: 2023-02-03

## 2023-01-13 NOTE — PATIENT INSTRUCTIONS
Hydrate well  Rest  Tylenol OTC as directed  Albuterol can cause jitteriness, palpitations  Cough medication causes drowsiness  Report to ER immediately if symptoms worsen or persist

## 2023-01-13 NOTE — PROGRESS NOTES
Subjective:       Patient ID: Alyson Vila is a 27 y.o. female.    Chief Complaint: Cough, Nasal Congestion, and Fatigue    Cough  This is a new problem. The current episode started in the past 7 days. The problem has been gradually worsening. The problem occurs every few minutes. The cough is Productive of purulent sputum. Associated symptoms include a fever, nasal congestion, postnasal drip, rhinorrhea and wheezing. Pertinent negatives include no chest pain, chills, ear congestion, ear pain, headaches, heartburn, hemoptysis, myalgias, rash, sore throat, shortness of breath, sweats or weight loss. The symptoms are aggravated by exercise. Risk factors for lung disease include animal exposure and occupational exposure. She has tried OTC cough suppressant (sudafed, mucinex d; states has OTC nasal spray, whihc helps with nasal congestion) for the symptoms. The treatment provided no relief. There is no history of asthma, bronchiectasis, bronchitis, COPD, emphysema, environmental allergies or pneumonia.   Past Medical History:   Diagnosis Date    PCOS (polycystic ovarian syndrome)      Social History     Socioeconomic History    Marital status: Single   Tobacco Use    Smoking status: Never   Substance and Sexual Activity    Alcohol use: Yes     Comment: Socially only. Maybe 2x per month    Drug use: Never    Sexual activity: Yes     Partners: Male     Birth control/protection: Condom, OCP     No past surgical history on file.    Review of Systems   Constitutional:  Positive for fever. Negative for chills and weight loss.   HENT:  Positive for postnasal drip, rhinorrhea and sinus pressure/congestion. Negative for ear pain and sore throat.    Eyes: Negative.    Respiratory:  Positive for cough and wheezing. Negative for hemoptysis and shortness of breath.    Cardiovascular: Negative.  Negative for chest pain.   Gastrointestinal: Negative.  Negative for heartburn.   Endocrine: Negative.    Genitourinary: Negative.     Musculoskeletal: Negative.  Negative for myalgias.   Integumentary:  Negative for rash. Negative.   Allergic/Immunologic: Negative.  Negative for environmental allergies.   Neurological: Negative.  Negative for headaches.   Psychiatric/Behavioral: Negative.         Objective:      Physical Exam  Vitals and nursing note reviewed.   Constitutional:       Appearance: Normal appearance.   HENT:      Head: Normocephalic.      Right Ear: Tympanic membrane, ear canal and external ear normal.      Left Ear: Tympanic membrane, ear canal and external ear normal.      Nose: Congestion and rhinorrhea present.      Mouth/Throat:      Mouth: Mucous membranes are moist.      Pharynx: Oropharynx is clear.   Eyes:      Conjunctiva/sclera: Conjunctivae normal.      Pupils: Pupils are equal, round, and reactive to light.   Cardiovascular:      Rate and Rhythm: Normal rate and regular rhythm.      Pulses: Normal pulses.      Heart sounds: Normal heart sounds.   Pulmonary:      Effort: Pulmonary effort is normal.      Breath sounds: Normal breath sounds.   Abdominal:      General: Bowel sounds are normal.      Palpations: Abdomen is soft.   Musculoskeletal:         General: Normal range of motion.      Cervical back: Normal range of motion and neck supple.   Skin:     General: Skin is warm and dry.      Capillary Refill: Capillary refill takes 2 to 3 seconds.   Neurological:      Mental Status: She is alert and oriented to person, place, and time.   Psychiatric:         Mood and Affect: Mood normal.         Behavior: Behavior normal.         Thought Content: Thought content normal.         Judgment: Judgment normal.       Assessment:       Problem List Items Addressed This Visit    None  Visit Diagnoses       Upper respiratory tract infection, unspecified type    -  Primary    Cough, unspecified type        Relevant Orders    POCT COVID-19 Rapid Screening    POCT Influenza A/B    Nasal congestion                  Plan:            Alyson was seen today for cough, nasal congestion and fatigue.    Diagnoses and all orders for this visit:    Upper respiratory tract infection, unspecified type  Cough, unspecified type  Nasal congestion  Wheezing  -     POCT COVID-19 Rapid Screening  -     POCT Influenza A/B  -     X-Ray Chest PA And Lateral; Future        -     promethazine-dextromethorphan (PROMETHAZINE-DM) 6.25-15 mg/5 mL Syrp; Take 5 mLs by mouth 2 (two) times daily as needed.  -     albuterol (PROVENTIL/VENTOLIN HFA) 90 mcg/actuation inhaler; Inhale 2 puffs into the lungs every 6 (six) hours as needed for Wheezing or Shortness of Breath.  -     azithromycin (Z-DUSTY) 250 MG tablet; Take 2 tablets by mouth on day 1; Take 1 tablet by mouth on days 2-5  -     methylPREDNISolone (MEDROL DOSEPACK) 4 mg tablet; use as directed  Hydrate well  Rest  Tylenol OTC as directed  Report to ER immediately if symptoms worsen or persist

## 2023-03-09 ENCOUNTER — PATIENT MESSAGE (OUTPATIENT)
Dept: ENDOCRINOLOGY | Facility: CLINIC | Age: 28
End: 2023-03-09
Payer: COMMERCIAL

## 2023-03-09 DIAGNOSIS — R73.02 IGT (IMPAIRED GLUCOSE TOLERANCE): Primary | ICD-10-CM

## 2023-03-10 ENCOUNTER — PATIENT MESSAGE (OUTPATIENT)
Dept: ENDOCRINOLOGY | Facility: CLINIC | Age: 28
End: 2023-03-10
Payer: COMMERCIAL

## 2023-03-10 RX ORDER — SEMAGLUTIDE 1.34 MG/ML
0.5 INJECTION, SOLUTION SUBCUTANEOUS
Qty: 1 PEN | Refills: 11 | Status: SHIPPED | OUTPATIENT
Start: 2023-03-10 | End: 2023-04-27

## 2023-03-20 RX ORDER — LEVOCETIRIZINE DIHYDROCHLORIDE 5 MG/1
TABLET, FILM COATED ORAL
Qty: 90 TABLET | Refills: 0 | Status: SHIPPED | OUTPATIENT
Start: 2023-03-20 | End: 2023-04-10 | Stop reason: SDUPTHER

## 2023-03-20 NOTE — TELEPHONE ENCOUNTER
----- Message from Lynn Hector sent at 3/20/2023  1:32 PM CDT -----  Pt is requesting a refill for levocetirizine (XYZAL) 5 MG tablet. Call back number is [phone]    [id number] - [place] [address] - [address] AT [facility] [address]  [address]  [place] [address]  Phone: [phone] Fax: [phone]

## 2023-03-20 NOTE — TELEPHONE ENCOUNTER
No new care gaps identified.  [facility] Embedded Care Gaps. Reference number: [id number]. 3/20/2023   1:49:39 PM CDT

## 2023-04-10 NOTE — TELEPHONE ENCOUNTER
No new care gaps identified.  [facility] Embedded Care Gaps. Reference number: [id number]. 4/10/2023   4:42:57 PM CDT

## 2023-04-12 RX ORDER — LEVOCETIRIZINE DIHYDROCHLORIDE 5 MG/1
5 TABLET, FILM COATED ORAL DAILY
Qty: 90 TABLET | Refills: 0 | Status: SHIPPED | OUTPATIENT
Start: 2023-04-12 | End: 2023-10-31

## 2023-04-26 ENCOUNTER — PATIENT MESSAGE (OUTPATIENT)
Dept: ENDOCRINOLOGY | Facility: CLINIC | Age: 28
End: 2023-04-26
Payer: COMMERCIAL

## 2023-04-26 DIAGNOSIS — R73.02 IGT (IMPAIRED GLUCOSE TOLERANCE): ICD-10-CM

## 2023-04-27 RX ORDER — SEMAGLUTIDE 0.68 MG/ML
0.5 INJECTION, SOLUTION SUBCUTANEOUS
Qty: 3 ML | Refills: 11 | Status: SHIPPED | OUTPATIENT
Start: 2023-04-27 | End: 2023-06-05

## 2023-06-05 RX ORDER — SEMAGLUTIDE 1.34 MG/ML
1 INJECTION, SOLUTION SUBCUTANEOUS
Qty: 3 ML | Refills: 11 | Status: SHIPPED | OUTPATIENT
Start: 2023-06-05 | End: 2023-10-02 | Stop reason: SDUPTHER

## 2023-06-05 RX ORDER — SEMAGLUTIDE 0.68 MG/ML
0.5 INJECTION, SOLUTION SUBCUTANEOUS
Qty: 3 ML | Refills: 11 | Status: CANCELLED | OUTPATIENT
Start: 2023-06-05

## 2023-06-09 ENCOUNTER — OFFICE VISIT (OUTPATIENT)
Dept: FAMILY MEDICINE | Facility: CLINIC | Age: 28
End: 2023-06-09
Payer: COMMERCIAL

## 2023-06-09 VITALS
HEART RATE: 71 BPM | DIASTOLIC BLOOD PRESSURE: 79 MMHG | TEMPERATURE: 98 F | SYSTOLIC BLOOD PRESSURE: 114 MMHG | WEIGHT: 216.31 LBS | OXYGEN SATURATION: 99 % | BODY MASS INDEX: 39.56 KG/M2

## 2023-06-09 DIAGNOSIS — J32.9 SINUSITIS, UNSPECIFIED CHRONICITY, UNSPECIFIED LOCATION: Primary | ICD-10-CM

## 2023-06-09 DIAGNOSIS — R09.81 NASAL CONGESTION: ICD-10-CM

## 2023-06-09 PROCEDURE — 3078F PR MOST RECENT DIASTOLIC BLOOD PRESSURE < 80 MM HG: ICD-10-PCS | Mod: CPTII,S$GLB,, | Performed by: NURSE PRACTITIONER

## 2023-06-09 PROCEDURE — 99999 PR PBB SHADOW E&M-EST. PATIENT-LVL IV: ICD-10-PCS | Mod: PBBFAC,,, | Performed by: NURSE PRACTITIONER

## 2023-06-09 PROCEDURE — 3074F PR MOST RECENT SYSTOLIC BLOOD PRESSURE < 130 MM HG: ICD-10-PCS | Mod: CPTII,S$GLB,, | Performed by: NURSE PRACTITIONER

## 2023-06-09 PROCEDURE — 99213 PR OFFICE/OUTPT VISIT, EST, LEVL III, 20-29 MIN: ICD-10-PCS | Mod: 25,S$GLB,, | Performed by: NURSE PRACTITIONER

## 2023-06-09 PROCEDURE — 1160F RVW MEDS BY RX/DR IN RCRD: CPT | Mod: CPTII,S$GLB,, | Performed by: NURSE PRACTITIONER

## 2023-06-09 PROCEDURE — 96372 THER/PROPH/DIAG INJ SC/IM: CPT | Mod: S$GLB,,, | Performed by: NURSE PRACTITIONER

## 2023-06-09 PROCEDURE — 1159F MED LIST DOCD IN RCRD: CPT | Mod: CPTII,S$GLB,, | Performed by: NURSE PRACTITIONER

## 2023-06-09 PROCEDURE — 3008F PR BODY MASS INDEX (BMI) DOCUMENTED: ICD-10-PCS | Mod: CPTII,S$GLB,, | Performed by: NURSE PRACTITIONER

## 2023-06-09 PROCEDURE — 96372 PR INJECTION,THERAP/PROPH/DIAG2ST, IM OR SUBCUT: ICD-10-PCS | Mod: S$GLB,,, | Performed by: NURSE PRACTITIONER

## 2023-06-09 PROCEDURE — 3074F SYST BP LT 130 MM HG: CPT | Mod: CPTII,S$GLB,, | Performed by: NURSE PRACTITIONER

## 2023-06-09 PROCEDURE — 3078F DIAST BP <80 MM HG: CPT | Mod: CPTII,S$GLB,, | Performed by: NURSE PRACTITIONER

## 2023-06-09 PROCEDURE — 99999 PR PBB SHADOW E&M-EST. PATIENT-LVL IV: CPT | Mod: PBBFAC,,, | Performed by: NURSE PRACTITIONER

## 2023-06-09 PROCEDURE — 3008F BODY MASS INDEX DOCD: CPT | Mod: CPTII,S$GLB,, | Performed by: NURSE PRACTITIONER

## 2023-06-09 PROCEDURE — 1159F PR MEDICATION LIST DOCUMENTED IN MEDICAL RECORD: ICD-10-PCS | Mod: CPTII,S$GLB,, | Performed by: NURSE PRACTITIONER

## 2023-06-09 PROCEDURE — 1160F PR REVIEW ALL MEDS BY PRESCRIBER/CLIN PHARMACIST DOCUMENTED: ICD-10-PCS | Mod: CPTII,S$GLB,, | Performed by: NURSE PRACTITIONER

## 2023-06-09 PROCEDURE — 99213 OFFICE O/P EST LOW 20 MIN: CPT | Mod: 25,S$GLB,, | Performed by: NURSE PRACTITIONER

## 2023-06-09 RX ORDER — AZELASTINE 1 MG/ML
1 SPRAY, METERED NASAL 2 TIMES DAILY
Qty: 30 ML | Refills: 0 | Status: SHIPPED | OUTPATIENT
Start: 2023-06-09 | End: 2023-06-16

## 2023-06-09 RX ORDER — CEPHALEXIN 500 MG/1
500 CAPSULE ORAL EVERY 12 HOURS
Qty: 20 CAPSULE | Refills: 0 | Status: SHIPPED | OUTPATIENT
Start: 2023-06-09 | End: 2023-06-13

## 2023-06-09 RX ORDER — DEXAMETHASONE SODIUM PHOSPHATE 4 MG/ML
8 INJECTION, SOLUTION INTRA-ARTICULAR; INTRALESIONAL; INTRAMUSCULAR; INTRAVENOUS; SOFT TISSUE ONCE
Status: COMPLETED | OUTPATIENT
Start: 2023-06-09 | End: 2023-06-09

## 2023-06-09 RX ADMIN — DEXAMETHASONE SODIUM PHOSPHATE 8 MG: 4 INJECTION, SOLUTION INTRA-ARTICULAR; INTRALESIONAL; INTRAMUSCULAR; INTRAVENOUS; SOFT TISSUE at 10:06

## 2023-06-09 NOTE — PATIENT INSTRUCTIONS
Hydrate well  Rest  Tylenol OTC as directed  Warm salt water gargles PRN  Report to ER immediately symptoms worsen or persist

## 2023-06-09 NOTE — PROGRESS NOTES
Subjective     Patient ID: Alyson Vila is a 27 y.o. female.    Chief Complaint: Sore Throat    Sore Throat   This is a new problem. The current episode started in the past 7 days. The problem has been unchanged. The pain is worse on the right side. There has been no fever. The pain is at a severity of 3/10. The pain is mild. Associated symptoms include congestion and headaches. Pertinent negatives include no abdominal pain, coughing, diarrhea, drooling, ear discharge, ear pain, hoarse voice, plugged ear sensation, neck pain, shortness of breath, stridor, swollen glands, trouble swallowing or vomiting. She has had no exposure to strep or mono. She has tried NSAIDs for the symptoms. The treatment provided mild relief.   Past Medical History:   Diagnosis Date    PCOS (polycystic ovarian syndrome)      Social History     Socioeconomic History    Marital status: Single   Tobacco Use    Smoking status: Never   Substance and Sexual Activity    Alcohol use: Yes     Comment: Socially only. Maybe 2x per month    Drug use: Never    Sexual activity: Yes     Partners: Male     Birth control/protection: Condom, OCP     History reviewed. No pertinent surgical history.    Review of Systems   Constitutional: Negative.    HENT:  Positive for nasal congestion, postnasal drip, rhinorrhea, sinus pressure/congestion and sore throat (states resolved). Negative for drooling, ear discharge, ear pain, hoarse voice and trouble swallowing.    Eyes: Negative.    Respiratory: Negative.  Negative for cough, shortness of breath and stridor.    Cardiovascular: Negative.    Gastrointestinal: Negative.  Negative for abdominal pain, diarrhea and vomiting.   Endocrine: Negative.    Genitourinary: Negative.    Musculoskeletal: Negative.  Negative for neck pain.   Integumentary:  Negative.   Allergic/Immunologic: Positive for environmental allergies.   Neurological:  Positive for headaches.   Psychiatric/Behavioral: Negative.          Objective      Physical Exam  Vitals and nursing note reviewed.   Constitutional:       Appearance: Normal appearance.   HENT:      Head: Normocephalic.      Right Ear: Tympanic membrane, ear canal and external ear normal.      Left Ear: Tympanic membrane, ear canal and external ear normal.      Nose: Congestion and rhinorrhea present.      Right Sinus: Maxillary sinus tenderness present. No frontal sinus tenderness.      Left Sinus: Maxillary sinus tenderness present. No frontal sinus tenderness.      Mouth/Throat:      Mouth: Mucous membranes are moist.      Pharynx: Oropharynx is clear.   Eyes:      Conjunctiva/sclera: Conjunctivae normal.      Pupils: Pupils are equal, round, and reactive to light.   Cardiovascular:      Rate and Rhythm: Normal rate and regular rhythm.      Pulses: Normal pulses.      Heart sounds: Normal heart sounds.   Pulmonary:      Effort: Pulmonary effort is normal.      Breath sounds: Normal breath sounds.   Abdominal:      General: Bowel sounds are normal.      Palpations: Abdomen is soft.   Musculoskeletal:         General: Normal range of motion.      Cervical back: Normal range of motion and neck supple.   Skin:     General: Skin is warm and dry.      Capillary Refill: Capillary refill takes 2 to 3 seconds.   Neurological:      Mental Status: She is alert and oriented to person, place, and time.   Psychiatric:         Mood and Affect: Mood normal.         Behavior: Behavior normal.         Thought Content: Thought content normal.         Judgment: Judgment normal.          Assessment and Plan     1. Sinusitis, unspecified chronicity, unspecified location  2. Nasal congestion         -     dexAMETHasone injection 8 mg  -     cephALEXin (KEFLEX) 500 MG capsule; Take 1 capsule (500 mg total) by mouth every 12 (twelve) hours. for 10 days  Dispense: 20 capsule; Refill: 0  -     azelastine (ASTELIN) 137 mcg (0.1 %) nasal spray; 1 spray (137 mcg total) by Nasal route 2 (two) times daily. for 7 days   Dispense: 30 mL; Refill: 0  Hydrate well  Rest  Tylenol OTC as directed  Warm salt water gargles PRN  Report to ER immediately symptoms worsen or persist               No follow-ups on file.

## 2023-06-13 ENCOUNTER — OFFICE VISIT (OUTPATIENT)
Dept: PRIMARY CARE CLINIC | Facility: CLINIC | Age: 28
End: 2023-06-13
Payer: COMMERCIAL

## 2023-06-13 ENCOUNTER — PATIENT MESSAGE (OUTPATIENT)
Dept: FAMILY MEDICINE | Facility: CLINIC | Age: 28
End: 2023-06-13
Payer: COMMERCIAL

## 2023-06-13 DIAGNOSIS — F41.1 GAD (GENERALIZED ANXIETY DISORDER): ICD-10-CM

## 2023-06-13 DIAGNOSIS — E66.01 SEVERE OBESITY (BMI 35.0-39.9) WITH COMORBIDITY: Primary | ICD-10-CM

## 2023-06-13 DIAGNOSIS — F33.0 MILD EPISODE OF RECURRENT MAJOR DEPRESSIVE DISORDER: ICD-10-CM

## 2023-06-13 PROCEDURE — 1159F MED LIST DOCD IN RCRD: CPT | Mod: CPTII,95,, | Performed by: INTERNAL MEDICINE

## 2023-06-13 PROCEDURE — 1160F PR REVIEW ALL MEDS BY PRESCRIBER/CLIN PHARMACIST DOCUMENTED: ICD-10-PCS | Mod: CPTII,95,, | Performed by: INTERNAL MEDICINE

## 2023-06-13 PROCEDURE — 1160F RVW MEDS BY RX/DR IN RCRD: CPT | Mod: CPTII,95,, | Performed by: INTERNAL MEDICINE

## 2023-06-13 PROCEDURE — 99213 OFFICE O/P EST LOW 20 MIN: CPT | Mod: 95,,, | Performed by: INTERNAL MEDICINE

## 2023-06-13 PROCEDURE — 1159F PR MEDICATION LIST DOCUMENTED IN MEDICAL RECORD: ICD-10-PCS | Mod: CPTII,95,, | Performed by: INTERNAL MEDICINE

## 2023-06-13 PROCEDURE — 99213 PR OFFICE/OUTPT VISIT, EST, LEVL III, 20-29 MIN: ICD-10-PCS | Mod: 95,,, | Performed by: INTERNAL MEDICINE

## 2023-06-13 RX ORDER — BUPROPION HYDROCHLORIDE 150 MG/1
150 TABLET ORAL DAILY
Qty: 30 TABLET | Refills: 1 | Status: SHIPPED | OUTPATIENT
Start: 2023-06-13 | End: 2023-07-05

## 2023-06-13 NOTE — PROGRESS NOTES
Primary Care Telemedicine Note  The patient location is:  Patient Home   The chief complaint leading to consultation is: anxiety/depression  Total time spent with patient: 15 min    Visit type: Virtual visit with synchronous audio only and video  Each patient to whom he or she provides medical services by telemedicine is:  (1) informed of the relationship between the physician and patient and the respective role of any other health care provider with respect to management of the patient; and (2) notified that he or she may decline to receive medical services by telemedicine and may withdraw from such care at any time.      Assessment/Plan:    Problem List Items Addressed This Visit          Psychiatric    GAL (generalized anxiety disorder)    Relevant Medications    buPROPion (WELLBUTRIN XL) 150 MG TB24 tablet    Mild episode of recurrent major depressive disorder    Overview     -acute on chronic anxiety and depression symptoms  -following with therapist but she has suggested that patient try medication for symptoms  -she has been on zoloft in the past with AE; no other medications in the past  -plan to start trial of wellbutrin xr 150 mg QD with close follow up  -discussed risk of discontinuing this medication without tapering  -patient was educated, advised of side effects, and all questions were answered.  Patient voiced understanding  -patient will follow up routinely and notify us if having any side effects or worsening or persistent symptoms.  ER precautions were given.  -of note, patient's therapist has also recommended treatment for ADD after testing in her office was suggestive of this diagnosis; she will be faxing me results and we can discuss treatment at next visit if she is doing well with wellbutrin           Relevant Medications    buPROPion (WELLBUTRIN XL) 150 MG TB24 tablet       Endocrine    Severe obesity (BMI 35.0-39.9) with comorbidity - Primary    Overview     General weight loss/lifestyle  modification strategies discussed (elicit support from others; identify saboteurs; non-food rewards, etc).  Informal exercise measures discussed, e.g. taking stairs instead of elevator.  Regular aerobic exercise program discussed.  Medication: recently started on GLP1a by endocrinology.              Follow up in about 3 weeks (around 7/4/2023).    Irasema Jaquez MD  _____________________________________________________________________________________________________________________________________________________    CC: anxiety/depression    HPI:    Patient is an established patient who presents today via virtual visit.    Depression: Patient complains of depression and anxiety symptoms. This has been a chronic condition but noticed recent worsening of symptoms over the past several months.  She complains of anhedonia, depressed mood, and hypersomnia.  She denies current suicidal and homicidal plan or intent.   Family history significant for no psychiatric illness.Possible organic causes contributing are: none.  Risk factors: previous episode of depression Previous treatment includes Zoloft and  she is currently participating in therapy . She complains of the following side effects from the treatment:  zoloft made her feel too numb .    No other new complaints today.      Past Medical History:  Past Medical History:   Diagnosis Date    PCOS (polycystic ovarian syndrome)      History reviewed. No pertinent surgical history.  Review of patient's allergies indicates:   Allergen Reactions    Grass pollen-sweet vernal grass, std. Itching and Tinitus    Ragweed Itching and Tinitus     Social History     Tobacco Use    Smoking status: Never   Substance Use Topics    Alcohol use: Yes     Comment: Socially only. Maybe 2x per month    Drug use: Never     Family History   Problem Relation Age of Onset    Alcohol abuse Mother     Cervical cancer Mother      Current Outpatient Medications on File Prior to Visit    Medication Sig Dispense Refill    azelastine (ASTELIN) 137 mcg (0.1 %) nasal spray 1 spray (137 mcg total) by Nasal route 2 (two) times daily. for 7 days 30 mL 0    levocetirizine (XYZAL) 5 MG tablet Take 1 tablet (5 mg total) by mouth once daily. levocetirizine 5 mg tablet 90 tablet 0    norethindrone-e.estradioL-iron (MINASTRIN 24 FE) 1 mg-20 mcg(24) /75 mg (4) Chew Take 1 tablet by mouth once daily.      semaglutide (OZEMPIC) 1 mg/dose (4 mg/3 mL) Inject 1 mg into the skin every 7 days. 3 mL 11    [DISCONTINUED] albuterol (PROVENTIL/VENTOLIN HFA) 90 mcg/actuation inhaler Inhale 2 puffs into the lungs every 6 (six) hours as needed for Wheezing or Shortness of Breath. 18 g 0    [DISCONTINUED] cephALEXin (KEFLEX) 500 MG capsule Take 1 capsule (500 mg total) by mouth every 12 (twelve) hours. for 10 days 20 capsule 0     No current facility-administered medications on file prior to visit.       Review of Systems   Constitutional:  Negative for chills, fever and malaise/fatigue.   HENT:  Negative for congestion, hearing loss and sore throat.    Eyes:  Negative for blurred vision, double vision and discharge.   Respiratory:  Negative for cough, shortness of breath and wheezing.    Cardiovascular:  Negative for chest pain, palpitations and leg swelling.   Gastrointestinal:  Negative for abdominal pain, blood in stool, constipation, diarrhea and vomiting.   Genitourinary:  Negative for dysuria, frequency and hematuria.   Musculoskeletal:  Negative for back pain, joint pain and neck pain.   Neurological:  Negative for weakness and headaches.   Endo/Heme/Allergies:  Negative for polydipsia.   Psychiatric/Behavioral:  Positive for depression. Negative for suicidal ideas. The patient is nervous/anxious.        Physical Exam   No flowsheet data found.  No flowsheet data found.      No flowsheet data found.    Physical Exam  Constitutional:       General: She is not in acute distress.     Appearance: She is  well-developed.   HENT:      Head: Normocephalic and atraumatic.   Pulmonary:      Effort: Pulmonary effort is normal. No respiratory distress.   Abdominal:      General: There is no distension.   Musculoskeletal:         General: Normal range of motion.      Cervical back: Normal range of motion.   Neurological:      Mental Status: She is alert and oriented to person, place, and time.   Psychiatric:         Mood and Affect: Mood normal.         Behavior: Behavior normal.         Thought Content: Thought content normal.         Judgment: Judgment normal.     The patient's Health Maintenance was reviewed and the following appears to be due at this time:  Health Maintenance Due   Topic Date Due    COVID-19 Vaccine (3 - Moderna series) 06/08/2021

## 2023-07-05 ENCOUNTER — TELEPHONE (OUTPATIENT)
Dept: FAMILY MEDICINE | Facility: CLINIC | Age: 28
End: 2023-07-05

## 2023-07-05 ENCOUNTER — OFFICE VISIT (OUTPATIENT)
Dept: FAMILY MEDICINE | Facility: CLINIC | Age: 28
End: 2023-07-05
Payer: COMMERCIAL

## 2023-07-05 DIAGNOSIS — F41.1 GAD (GENERALIZED ANXIETY DISORDER): ICD-10-CM

## 2023-07-05 DIAGNOSIS — F98.8 ATTENTION DEFICIT DISORDER (ADD) WITHOUT HYPERACTIVITY: ICD-10-CM

## 2023-07-05 DIAGNOSIS — F33.0 MILD EPISODE OF RECURRENT MAJOR DEPRESSIVE DISORDER: ICD-10-CM

## 2023-07-05 PROCEDURE — 99213 PR OFFICE/OUTPT VISIT, EST, LEVL III, 20-29 MIN: ICD-10-PCS | Mod: 95,,, | Performed by: INTERNAL MEDICINE

## 2023-07-05 PROCEDURE — 1160F PR REVIEW ALL MEDS BY PRESCRIBER/CLIN PHARMACIST DOCUMENTED: ICD-10-PCS | Mod: CPTII,95,, | Performed by: INTERNAL MEDICINE

## 2023-07-05 PROCEDURE — 1159F MED LIST DOCD IN RCRD: CPT | Mod: CPTII,95,, | Performed by: INTERNAL MEDICINE

## 2023-07-05 PROCEDURE — 1159F PR MEDICATION LIST DOCUMENTED IN MEDICAL RECORD: ICD-10-PCS | Mod: CPTII,95,, | Performed by: INTERNAL MEDICINE

## 2023-07-05 PROCEDURE — 99213 OFFICE O/P EST LOW 20 MIN: CPT | Mod: 95,,, | Performed by: INTERNAL MEDICINE

## 2023-07-05 PROCEDURE — 1160F RVW MEDS BY RX/DR IN RCRD: CPT | Mod: CPTII,95,, | Performed by: INTERNAL MEDICINE

## 2023-07-05 RX ORDER — BUPROPION HYDROCHLORIDE 300 MG/1
300 TABLET ORAL DAILY
Qty: 30 TABLET | Refills: 1 | Status: SHIPPED | OUTPATIENT
Start: 2023-07-05 | End: 2023-07-18

## 2023-07-05 NOTE — PROGRESS NOTES
Primary Care Telemedicine Note  The patient location is:  Patient Home   The chief complaint leading to consultation is: depression follow up  Total time spent with patient: 10 min    Visit type: Virtual visit with synchronous audio only and video  Each patient to whom he or she provides medical services by telemedicine is:  (1) informed of the relationship between the physician and patient and the respective role of any other health care provider with respect to management of the patient; and (2) notified that he or she may decline to receive medical services by telemedicine and may withdraw from such care at any time.      Assessment/Plan:    Problem List Items Addressed This Visit          Psychiatric    Attention deficit disorder (ADD) without hyperactivity    Overview     -therapist has conducted testing suggesting diagnosis of ADD  -discussed starting treatment but I would like to address depression treatment first and once that is stable, then can discuss possibly starting trial of stimulant therapy         GAL (generalized anxiety disorder)    Relevant Medications    buPROPion (WELLBUTRIN XL) 300 MG 24 hr tablet    Mild episode of recurrent major depressive disorder    Overview     -acute on chronic anxiety and depression symptoms  -following with therapist but she has suggested that patient try medication for symptoms  -she has been on zoloft in the past with AE; no other medications in the past  -recently started trial of wellbutrin xr 150 mg QD but has not noticed any improvement of symptoms  -denies AE of the medication thus far  -plan to increase to 300 mg QD with close follow up in 2 weeks  -discussed risk of discontinuing this medication without tapering  -patient was educated, advised of side effects, and all questions were answered.  Patient voiced understanding  -patient will follow up routinely and notify us if having any side effects or worsening or persistent symptoms.  ER precautions were  given.           Relevant Medications    buPROPion (WELLBUTRIN XL) 300 MG 24 hr tablet       Follow up in about 2 weeks (around 7/19/2023).    Irasema aJquez MD  _____________________________________________________________________________________________________________________________________________________    CC: depression follow up    HPI:    Patient is an established patient who presents today via virtual visit.    Depression: Patient presented last month with complaints of depression and anxiety symptoms. This has been a chronic condition but noticed recent worsening of symptoms over the past several months.  She complains of anhedonia, depressed mood, and hypersomnia.  She denies current suicidal and homicidal plan or intent.   Family history significant for no psychiatric illness.Possible organic causes contributing are: none.  Risk factors: previous episode of depression Previous treatment includes Zoloft and  she is currently participating in therapy . She complains of the following side effects from the treatment:  zoloft made her feel too numb . She was started on treatment last visit with wellbutrin xr 150 mg QD. Since starting she has not really seen any difference in her symptoms, improvement or worsening. She has not had any issues with side effects. She is open to trying a higher dose of the medication.    No other new complaints today.       Past Medical History:  Past Medical History:   Diagnosis Date    PCOS (polycystic ovarian syndrome)      History reviewed. No pertinent surgical history.  Review of patient's allergies indicates:   Allergen Reactions    Grass pollen-sweet vernal grass, std. Itching and Tinitus    Ragweed Itching and Tinitus     Social History     Tobacco Use    Smoking status: Never   Substance Use Topics    Alcohol use: Yes     Comment: Socially only. Maybe 2x per month    Drug use: Never     Family History   Problem Relation Age of Onset    Alcohol abuse Mother      Cervical cancer Mother      Current Outpatient Medications on File Prior to Visit   Medication Sig Dispense Refill    azelastine (ASTELIN) 137 mcg (0.1 %) nasal spray 1 spray (137 mcg total) by Nasal route 2 (two) times daily. for 7 days 30 mL 0    levocetirizine (XYZAL) 5 MG tablet Take 1 tablet (5 mg total) by mouth once daily. levocetirizine 5 mg tablet 90 tablet 0    norethindrone-e.estradioL-iron (MINASTRIN 24 FE) 1 mg-20 mcg(24) /75 mg (4) Chew Take 1 tablet by mouth once daily.      semaglutide (OZEMPIC) 1 mg/dose (4 mg/3 mL) Inject 1 mg into the skin every 7 days. 3 mL 11    [DISCONTINUED] buPROPion (WELLBUTRIN XL) 150 MG TB24 tablet Take 1 tablet (150 mg total) by mouth once daily. 30 tablet 1     No current facility-administered medications on file prior to visit.       Review of Systems   Constitutional:  Negative for chills, fever and malaise/fatigue.   HENT:  Negative for hearing loss.    Eyes:  Negative for discharge.   Respiratory:  Negative for cough, shortness of breath and wheezing.    Cardiovascular:  Negative for chest pain, palpitations and leg swelling.   Gastrointestinal:  Negative for abdominal pain, blood in stool, constipation, diarrhea and vomiting.   Genitourinary:  Negative for dysuria, frequency and hematuria.   Musculoskeletal:  Negative for back pain, joint pain and neck pain.   Neurological:  Negative for weakness and headaches.   Endo/Heme/Allergies:  Negative for polydipsia.   Psychiatric/Behavioral:  Negative for depression and suicidal ideas. The patient is not nervous/anxious.        Physical Exam   No flowsheet data found.  No flowsheet data found.      No flowsheet data found.    Physical Exam  Constitutional:       General: She is not in acute distress.     Appearance: She is well-developed.   HENT:      Head: Normocephalic and atraumatic.   Pulmonary:      Effort: Pulmonary effort is normal. No respiratory distress.   Abdominal:      General: There is no distension.    Musculoskeletal:         General: Normal range of motion.      Cervical back: Normal range of motion.   Neurological:      Mental Status: She is alert and oriented to person, place, and time.   Psychiatric:         Mood and Affect: Mood normal.         Behavior: Behavior normal.         Thought Content: Thought content normal.         Judgment: Judgment normal.       The patient's Health Maintenance was reviewed and the following appears to be due at this time:  Health Maintenance Due   Topic Date Due    COVID-19 Vaccine (3 - Moderna series) 06/08/2021   Answers submitted by the patient for this visit:  Review of Systems Questionnaire (Submitted on 7/5/2023)  activity change: Yes  unexpected weight change: No  rhinorrhea: No  trouble swallowing: No  visual disturbance: No  chest tightness: No  polyuria: No  difficulty urinating: No  menstrual problem: No  joint swelling: No  arthralgias: No  confusion: No  dysphoric mood: Yes

## 2023-07-10 ENCOUNTER — PATIENT MESSAGE (OUTPATIENT)
Dept: PRIMARY CARE CLINIC | Facility: CLINIC | Age: 28
End: 2023-07-10
Payer: COMMERCIAL

## 2023-07-18 ENCOUNTER — OFFICE VISIT (OUTPATIENT)
Dept: PRIMARY CARE CLINIC | Facility: CLINIC | Age: 28
End: 2023-07-18
Payer: COMMERCIAL

## 2023-07-18 DIAGNOSIS — F33.0 MILD EPISODE OF RECURRENT MAJOR DEPRESSIVE DISORDER: ICD-10-CM

## 2023-07-18 DIAGNOSIS — F41.1 GAD (GENERALIZED ANXIETY DISORDER): Primary | ICD-10-CM

## 2023-07-18 PROCEDURE — 99213 OFFICE O/P EST LOW 20 MIN: CPT | Mod: 95,,, | Performed by: INTERNAL MEDICINE

## 2023-07-18 PROCEDURE — 99213 PR OFFICE/OUTPT VISIT, EST, LEVL III, 20-29 MIN: ICD-10-PCS | Mod: 95,,, | Performed by: INTERNAL MEDICINE

## 2023-07-18 PROCEDURE — 1160F RVW MEDS BY RX/DR IN RCRD: CPT | Mod: CPTII,95,, | Performed by: INTERNAL MEDICINE

## 2023-07-18 PROCEDURE — 1159F MED LIST DOCD IN RCRD: CPT | Mod: CPTII,95,, | Performed by: INTERNAL MEDICINE

## 2023-07-18 PROCEDURE — 1159F PR MEDICATION LIST DOCUMENTED IN MEDICAL RECORD: ICD-10-PCS | Mod: CPTII,95,, | Performed by: INTERNAL MEDICINE

## 2023-07-18 PROCEDURE — 1160F PR REVIEW ALL MEDS BY PRESCRIBER/CLIN PHARMACIST DOCUMENTED: ICD-10-PCS | Mod: CPTII,95,, | Performed by: INTERNAL MEDICINE

## 2023-07-18 RX ORDER — FLUOXETINE HYDROCHLORIDE 20 MG/1
20 CAPSULE ORAL DAILY
Qty: 30 CAPSULE | Refills: 1 | Status: SHIPPED | OUTPATIENT
Start: 2023-07-18 | End: 2023-08-15 | Stop reason: ALTCHOICE

## 2023-07-18 NOTE — PROGRESS NOTES
Primary Care Telemedicine Note  The patient location is:  Patient Home   The chief complaint leading to consultation is: depression follow up  Total time spent with patient: 10 min    Visit type: Virtual visit with synchronous audio only and video  Each patient to whom he or she provides medical services by telemedicine is:  (1) informed of the relationship between the physician and patient and the respective role of any other health care provider with respect to management of the patient; and (2) notified that he or she may decline to receive medical services by telemedicine and may withdraw from such care at any time.      Assessment/Plan:    Problem List Items Addressed This Visit          Psychiatric    GAL (generalized anxiety disorder) - Primary    Relevant Medications    FLUoxetine 20 MG capsule    Mild episode of recurrent major depressive disorder    Overview     -acute on chronic anxiety and depression symptoms  -following with therapist but she has suggested that patient try medication for symptoms  -she has been on zoloft in the past with AE; no other medications in the past  -recently started trial of wellbutrin xr but has not noticed any improvement of symptoms  -denies AE of the medication thus far  -wellbutrin increased last visit to 300 mg QD but experienced significant AE. She stopped the medication >1 week ago  -plan to start trial of prozac 20 mg instead, again with close follow up  -discussed risk of discontinuing this medication without tapering  -patient was educated, advised of side effects, and all questions were answered.  Patient voiced understanding  -patient will follow up routinely and notify us if having any side effects or worsening or persistent symptoms.  ER precautions were given.           Relevant Medications    FLUoxetine 20 MG capsule       Follow up in about 3 weeks (around 8/8/2023).    Irasema Jaquez,  MD  _____________________________________________________________________________________________________________________________________________________    CC: depression follow up    HPI:    Patient is an established patient who presents today via virtual visit.    Depression/anxiety: Patient presented recently with complaints of depression and anxiety symptoms. This has been a chronic condition but noticed recent worsening of symptoms over the past several months.  She complains of anhedonia, depressed mood, and hypersomnia.  She denies current suicidal and homicidal plan or intent.   Family history significant for no psychiatric illness.Possible organic causes contributing are: none.  Risk factors: previous episode of depression Previous treatment includes Zoloft and she is currently participating in therapy . She complains of the following side effects from the treatment:  zoloft made her feel too numb . She was started on treatment last month with wellbutrin xr 150 mg QD but did not really seen any difference in her symptoms, improvement or worsening. We increased to 300 mg last visit but she had significant AE with the higher dose so she stopped taking about one week ago. She is open to trying a different medication.    No other new complaints today.       Past Medical History:  Past Medical History:   Diagnosis Date    PCOS (polycystic ovarian syndrome)      History reviewed. No pertinent surgical history.  Review of patient's allergies indicates:   Allergen Reactions    Grass pollen-sweet vernal grass, std. Itching and Tinitus    Ragweed Itching and Tinitus     Social History     Tobacco Use    Smoking status: Never   Substance Use Topics    Alcohol use: Yes     Comment: Socially only. Maybe 2x per month    Drug use: Never     Family History   Problem Relation Age of Onset    Alcohol abuse Mother     Cervical cancer Mother      Current Outpatient Medications on File Prior to Visit   Medication Sig Dispense  Refill    azelastine (ASTELIN) 137 mcg (0.1 %) nasal spray 1 spray (137 mcg total) by Nasal route 2 (two) times daily. for 7 days 30 mL 0    levocetirizine (XYZAL) 5 MG tablet Take 1 tablet (5 mg total) by mouth once daily. levocetirizine 5 mg tablet 90 tablet 0    norethindrone-e.estradioL-iron (MINASTRIN 24 FE) 1 mg-20 mcg(24) /75 mg (4) Chew Take 1 tablet by mouth once daily.      semaglutide (OZEMPIC) 1 mg/dose (4 mg/3 mL) Inject 1 mg into the skin every 7 days. 3 mL 11    [DISCONTINUED] buPROPion (WELLBUTRIN XL) 300 MG 24 hr tablet Take 1 tablet (300 mg total) by mouth once daily. 30 tablet 1     No current facility-administered medications on file prior to visit.       Review of Systems   Constitutional:  Negative for chills, fever and malaise/fatigue.   HENT:  Negative for hearing loss.    Eyes:  Negative for discharge.   Respiratory:  Negative for cough, shortness of breath and wheezing.    Cardiovascular:  Negative for chest pain, palpitations and leg swelling.   Gastrointestinal:  Negative for abdominal pain, blood in stool, constipation, diarrhea and vomiting.   Genitourinary:  Negative for dysuria, frequency and hematuria.   Musculoskeletal:  Negative for back pain, joint pain and neck pain.   Neurological:  Negative for weakness and headaches.   Endo/Heme/Allergies:  Negative for polydipsia.   Psychiatric/Behavioral:  Positive for depression. Negative for suicidal ideas. The patient is nervous/anxious.        Physical Exam   No flowsheet data found.  No flowsheet data found.      No flowsheet data found.    Physical Exam  Constitutional:       General: She is not in acute distress.     Appearance: She is well-developed.   HENT:      Head: Normocephalic and atraumatic.   Pulmonary:      Effort: Pulmonary effort is normal. No respiratory distress.   Abdominal:      General: There is no distension.   Musculoskeletal:         General: Normal range of motion.      Cervical back: Normal range of motion.    Neurological:      Mental Status: She is alert and oriented to person, place, and time.   Psychiatric:         Mood and Affect: Mood normal.         Behavior: Behavior normal.         Thought Content: Thought content normal.         Judgment: Judgment normal.       The patient's Health Maintenance was reviewed and the following appears to be due at this time:  Health Maintenance Due   Topic Date Due    COVID-19 Vaccine (3 - Moderna series) 06/08/2021     Answers submitted by the patient for this visit:  Review of Systems Questionnaire (Submitted on 7/18/2023)  activity change: No  unexpected weight change: No  rhinorrhea: No  trouble swallowing: No  visual disturbance: No  chest tightness: No  polyuria: No  difficulty urinating: No  menstrual problem: No  joint swelling: No  arthralgias: No  confusion: Yes  dysphoric mood: Yes

## 2023-08-15 ENCOUNTER — OFFICE VISIT (OUTPATIENT)
Dept: PRIMARY CARE CLINIC | Facility: CLINIC | Age: 28
End: 2023-08-15
Payer: COMMERCIAL

## 2023-08-15 DIAGNOSIS — F41.1 GAD (GENERALIZED ANXIETY DISORDER): Primary | ICD-10-CM

## 2023-08-15 DIAGNOSIS — F33.0 MILD EPISODE OF RECURRENT MAJOR DEPRESSIVE DISORDER: ICD-10-CM

## 2023-08-15 PROCEDURE — 99213 PR OFFICE/OUTPT VISIT, EST, LEVL III, 20-29 MIN: ICD-10-PCS | Mod: 95,,, | Performed by: INTERNAL MEDICINE

## 2023-08-15 PROCEDURE — 1160F RVW MEDS BY RX/DR IN RCRD: CPT | Mod: CPTII,95,, | Performed by: INTERNAL MEDICINE

## 2023-08-15 PROCEDURE — 99213 OFFICE O/P EST LOW 20 MIN: CPT | Mod: 95,,, | Performed by: INTERNAL MEDICINE

## 2023-08-15 PROCEDURE — 1159F MED LIST DOCD IN RCRD: CPT | Mod: CPTII,95,, | Performed by: INTERNAL MEDICINE

## 2023-08-15 PROCEDURE — 1159F PR MEDICATION LIST DOCUMENTED IN MEDICAL RECORD: ICD-10-PCS | Mod: CPTII,95,, | Performed by: INTERNAL MEDICINE

## 2023-08-15 PROCEDURE — 1160F PR REVIEW ALL MEDS BY PRESCRIBER/CLIN PHARMACIST DOCUMENTED: ICD-10-PCS | Mod: CPTII,95,, | Performed by: INTERNAL MEDICINE

## 2023-08-15 RX ORDER — DESVENLAFAXINE SUCCINATE 50 MG/1
50 TABLET, EXTENDED RELEASE ORAL DAILY
Qty: 30 TABLET | Refills: 1 | Status: SHIPPED | OUTPATIENT
Start: 2023-08-15 | End: 2023-09-12 | Stop reason: SDUPTHER

## 2023-08-15 NOTE — PROGRESS NOTES
Primary Care Telemedicine Note  The patient location is:  Patient Home   The chief complaint leading to consultation is: anxiety/depression follow up  Total time spent with patient: 10 min    Visit type: Virtual visit with synchronous audio only and video  Each patient to whom he or she provides medical services by telemedicine is:  (1) informed of the relationship between the physician and patient and the respective role of any other health care provider with respect to management of the patient; and (2) notified that he or she may decline to receive medical services by telemedicine and may withdraw from such care at any time.      Assessment/Plan:    Problem List Items Addressed This Visit          Psychiatric    GAL (generalized anxiety disorder) - Primary    Relevant Medications    desvenlafaxine succinate (PRISTIQ) 50 MG Tb24    Mild episode of recurrent major depressive disorder    Overview     -acute on chronic anxiety and depression symptoms  -following with therapist but she has suggested that patient try medication for symptoms  -she has been on zoloft and wellbutrin in the past with AE; currently on prozac but has had worsening symptoms  -plan to start trial of pristiq 50 mg QD instead  -discussed risk of discontinuing this medication without tapering  -patient was educated, advised of side effects, and all questions were answered.  Patient voiced understanding  -patient will follow up routinely and notify us if having any side effects or worsening or persistent symptoms.  ER precautions were given.           Relevant Medications    desvenlafaxine succinate (PRISTIQ) 50 MG Tb24       Follow up in about 4 weeks (around 9/12/2023) for tele f/u me.    Irasema Jaquez MD  _____________________________________________________________________________________________________________________________________________________    CC: anxiety/depression follow up    HPI:    Patient is an established patient who  presents today via virtual visit.    Depression/anxiety: Patient presented recently with complaints of depression and anxiety symptoms. This has been a chronic condition but noticed recent worsening of symptoms over the past several months.  She complains of anhedonia, depressed mood, and hypersomnia.  She denies current suicidal and homicidal plan or intent.   Family history significant for no psychiatric illness.Possible organic causes contributing are: none.  Risk factors: previous episode of depression Previous treatment includes Zoloft and wellbutrin and she is currently participating in therapy . She complains of the following side effects from the treatment:  zoloft made her feel too numb, wellbutrin ineffective. Started on prozac last visit but noticed worsening of symptoms. She is open to trying a different medication.     No other new complaints today.      Past Medical History:  Past Medical History:   Diagnosis Date    PCOS (polycystic ovarian syndrome)      History reviewed. No pertinent surgical history.  Review of patient's allergies indicates:   Allergen Reactions    Grass pollen-sweet vernal grass, std. Itching and Tinitus    Ragweed Itching and Tinitus     Social History     Tobacco Use    Smoking status: Never   Substance Use Topics    Alcohol use: Yes     Comment: Socially only. Maybe 2x per month    Drug use: Never     Family History   Problem Relation Age of Onset    Alcohol abuse Mother     Cervical cancer Mother      Current Outpatient Medications on File Prior to Visit   Medication Sig Dispense Refill    azelastine (ASTELIN) 137 mcg (0.1 %) nasal spray 1 spray (137 mcg total) by Nasal route 2 (two) times daily. for 7 days 30 mL 0    levocetirizine (XYZAL) 5 MG tablet Take 1 tablet (5 mg total) by mouth once daily. levocetirizine 5 mg tablet 90 tablet 0    norethindrone-e.estradioL-iron (MINASTRIN 24 FE) 1 mg-20 mcg(24) /75 mg (4) Chew Take 1 tablet by mouth once daily.       semaglutide (OZEMPIC) 1 mg/dose (4 mg/3 mL) Inject 1 mg into the skin every 7 days. 3 mL 11    [DISCONTINUED] FLUoxetine 20 MG capsule Take 1 capsule (20 mg total) by mouth once daily. 30 capsule 1     No current facility-administered medications on file prior to visit.       Review of Systems   Constitutional:  Negative for chills, fever and malaise/fatigue.   HENT:  Negative for congestion, hearing loss and sore throat.    Eyes:  Negative for blurred vision, double vision and discharge.   Respiratory:  Negative for cough, shortness of breath and wheezing.    Cardiovascular:  Negative for chest pain, palpitations and leg swelling.   Gastrointestinal:  Negative for abdominal pain, blood in stool, constipation, diarrhea and vomiting.   Genitourinary:  Negative for dysuria, frequency and hematuria.   Musculoskeletal:  Negative for back pain, joint pain and neck pain.   Neurological:  Negative for weakness and headaches.   Endo/Heme/Allergies:  Negative for polydipsia.   Psychiatric/Behavioral:  Negative for depression. The patient is nervous/anxious.        Physical Exam   No flowsheet data found.  No flowsheet data found.      No flowsheet data found.    Physical Exam  Constitutional:       General: She is not in acute distress.     Appearance: Normal appearance. She is well-developed.   HENT:      Head: Normocephalic and atraumatic.   Eyes:      Conjunctiva/sclera: Conjunctivae normal.   Cardiovascular:      Rate and Rhythm: Normal rate and regular rhythm.      Pulses: Normal pulses.      Heart sounds: Normal heart sounds. No murmur heard.  Pulmonary:      Effort: Pulmonary effort is normal. No respiratory distress.      Breath sounds: Normal breath sounds.   Abdominal:      General: Bowel sounds are normal. There is no distension.      Palpations: Abdomen is soft.      Tenderness: There is no abdominal tenderness.   Musculoskeletal:         General: Normal range of motion.      Cervical back: Normal range of  motion and neck supple.   Skin:     General: Skin is warm and dry.      Findings: No rash.   Neurological:      General: No focal deficit present.      Mental Status: She is alert and oriented to person, place, and time.   Psychiatric:         Mood and Affect: Mood normal.         Behavior: Behavior normal.         Thought Content: Thought content normal.         Judgment: Judgment normal.     The patient's Health Maintenance was reviewed and the following appears to be due at this time:  Health Maintenance Due   Topic Date Due    COVID-19 Vaccine (3 - Moderna series) 06/08/2021

## 2023-09-12 ENCOUNTER — OFFICE VISIT (OUTPATIENT)
Dept: PRIMARY CARE CLINIC | Facility: CLINIC | Age: 28
End: 2023-09-12
Payer: COMMERCIAL

## 2023-09-12 ENCOUNTER — TELEPHONE (OUTPATIENT)
Dept: FAMILY MEDICINE | Facility: CLINIC | Age: 28
End: 2023-09-12
Payer: COMMERCIAL

## 2023-09-12 DIAGNOSIS — M25.532 PAIN IN BOTH WRISTS: Primary | ICD-10-CM

## 2023-09-12 DIAGNOSIS — F33.0 MILD EPISODE OF RECURRENT MAJOR DEPRESSIVE DISORDER: ICD-10-CM

## 2023-09-12 DIAGNOSIS — M25.531 PAIN IN BOTH WRISTS: Primary | ICD-10-CM

## 2023-09-12 DIAGNOSIS — F41.1 GAD (GENERALIZED ANXIETY DISORDER): ICD-10-CM

## 2023-09-12 PROCEDURE — 99213 PR OFFICE/OUTPT VISIT, EST, LEVL III, 20-29 MIN: ICD-10-PCS | Mod: 95,,, | Performed by: INTERNAL MEDICINE

## 2023-09-12 PROCEDURE — 99213 OFFICE O/P EST LOW 20 MIN: CPT | Mod: 95,,, | Performed by: INTERNAL MEDICINE

## 2023-09-12 RX ORDER — MELOXICAM 15 MG/1
15 TABLET ORAL DAILY
Qty: 30 TABLET | Refills: 0 | Status: SHIPPED | OUTPATIENT
Start: 2023-09-12 | End: 2023-10-10

## 2023-09-12 RX ORDER — DESVENLAFAXINE 100 MG/1
100 TABLET, EXTENDED RELEASE ORAL DAILY
Qty: 30 TABLET | Refills: 1 | Status: SHIPPED | OUTPATIENT
Start: 2023-09-12 | End: 2023-10-03 | Stop reason: SDUPTHER

## 2023-09-12 NOTE — PROGRESS NOTES
Primary Care Telemedicine Note  The patient location is:  Patient Home   The chief complaint leading to consultation is: anxiety/depression follow up  Total time spent with patient: 10 min    Visit type: Virtual visit with synchronous audio only and video  Each patient to whom he or she provides medical services by telemedicine is:  (1) informed of the relationship between the physician and patient and the respective role of any other health care provider with respect to management of the patient; and (2) notified that he or she may decline to receive medical services by telemedicine and may withdraw from such care at any time.      Assessment/Plan:    Problem List Items Addressed This Visit          Psychiatric    GAL (generalized anxiety disorder)    Relevant Medications    desvenlafaxine succinate (PRISTIQ) 100 MG Tb24    Mild episode of recurrent major depressive disorder    Overview     -acute on chronic anxiety and depression symptoms  -following with therapist but she has suggested that patient try medication for symptoms  -she has been on zoloft and wellbutrin in the past with AE; currently on prozac but has had worsening symptoms  -started trial of pristiq 50 mg last visit and has noticed some improvement but still having some residual symptoms  -plan to increase to 100 mg QD  -denies AE thus far with use  -discussed risk of discontinuing this medication without tapering  -patient was educated, advised of side effects, and all questions were answered.  Patient voiced understanding  -patient will follow up routinely and notify us if having any side effects or worsening or persistent symptoms.  ER precautions were given.           Relevant Medications    desvenlafaxine succinate (PRISTIQ) 100 MG Tb24     Other Visit Diagnoses       Pain in both wrists    -  Primary  -continue home exercises, wearing wrist brace, ice application  -start trial of NSAID  -follow up if symptoms persist or worsen     Relevant  Medications    meloxicam (MOBIC) 15 MG tablet            Follow up in about 3 weeks (around 10/3/2023) for tele f/u me.    Irasema Jaquez MD  _____________________________________________________________________________________________________________________________________________________    CC: anxiety/depression follow up     HPI:     Patient is an established patient who presents today via virtual visit.     Depression/anxiety: Patient presented recently with complaints of depression and anxiety symptoms. This has been a chronic condition but noticed recent worsening of symptoms over the past several months.  She complains of anhedonia, depressed mood, and hypersomnia.  She denies current suicidal and homicidal plan or intent.   Family history significant for no psychiatric illness.Possible organic causes contributing are: none.  Risk factors: previous episode of depression Previous treatment includes Zoloft and wellbutrin and she is currently participating in therapy . She complains of the following side effects from the treatment:  zoloft made her feel too numb, wellbutrin ineffective. Started on prozac recently but noticed worsening of symptoms. She was started on pristiq 50 mg QD last visit. She has seen some positive changes in her symptoms since starting. Denies AE of the medication thus far. She would like to see some further improvement in symptoms therefore plan to increase to next dose, 100 mg QD.    Also complaining of bilateral wrist pain. Hx of tendonitis in the past. She works as a  and surgical  so uses her hands a lot at work. She is seeing a chiropractor who has been treating her and gave her some exercises recently but wanted to discuss symptoms with me as well. She tried some tylenol for symptoms but with no improvement. She has not tried anything else for treatment.     No other new complaints today.      Past Medical History:  Past Medical History:   Diagnosis Date     PCOS (polycystic ovarian syndrome)      No past surgical history on file.  Review of patient's allergies indicates:   Allergen Reactions    Grass pollen-sweet vernal grass, std. Itching and Tinitus    Ragweed Itching and Tinitus     Social History     Tobacco Use    Smoking status: Never   Substance Use Topics    Alcohol use: Yes     Comment: Socially only. Maybe 2x per month    Drug use: Never     Family History   Problem Relation Age of Onset    Alcohol abuse Mother     Cervical cancer Mother      Current Outpatient Medications on File Prior to Visit   Medication Sig Dispense Refill    azelastine (ASTELIN) 137 mcg (0.1 %) nasal spray 1 spray (137 mcg total) by Nasal route 2 (two) times daily. for 7 days 30 mL 0    levocetirizine (XYZAL) 5 MG tablet Take 1 tablet (5 mg total) by mouth once daily. levocetirizine 5 mg tablet 90 tablet 0    norethindrone-e.estradioL-iron (MINASTRIN 24 FE) 1 mg-20 mcg(24) /75 mg (4) Chew Take 1 tablet by mouth once daily.      semaglutide (OZEMPIC) 1 mg/dose (4 mg/3 mL) Inject 1 mg into the skin every 7 days. 3 mL 11    [DISCONTINUED] desvenlafaxine succinate (PRISTIQ) 50 MG Tb24 Take 1 tablet (50 mg total) by mouth once daily. 30 tablet 1     No current facility-administered medications on file prior to visit.       Review of Systems   Constitutional:  Negative for chills, fever and malaise/fatigue.   HENT:  Negative for hearing loss.    Eyes:  Negative for discharge.   Respiratory:  Negative for cough, shortness of breath and wheezing.    Cardiovascular:  Negative for chest pain, palpitations and leg swelling.   Gastrointestinal:  Negative for abdominal pain, blood in stool, constipation, diarrhea and vomiting.   Genitourinary:  Negative for dysuria, frequency and hematuria.   Musculoskeletal:  Positive for joint pain. Negative for back pain and neck pain.   Neurological:  Negative for weakness and headaches.   Endo/Heme/Allergies:  Negative for polydipsia.   Psychiatric/Behavioral:   Negative for depression. The patient is not nervous/anxious.          Physical Exam        No data to display                   No data to display                       No data to display                Physical Exam  Constitutional:       General: She is not in acute distress.     Appearance: She is well-developed.   HENT:      Head: Normocephalic and atraumatic.   Pulmonary:      Effort: Pulmonary effort is normal. No respiratory distress.   Abdominal:      General: There is no distension.   Musculoskeletal:         General: Normal range of motion.      Cervical back: Normal range of motion.   Neurological:      Mental Status: She is alert and oriented to person, place, and time.   Psychiatric:         Mood and Affect: Mood normal.         Behavior: Behavior normal.         Thought Content: Thought content normal.         Judgment: Judgment normal.         The patient's Health Maintenance was reviewed and the following appears to be due at this time:  Health Maintenance Due   Topic Date Due    COVID-19 Vaccine (3 - Moderna series) 06/08/2021    Influenza Vaccine (1) 09/01/2023     Answers submitted by the patient for this visit:  Review of Systems Questionnaire (Submitted on 9/11/2023)  activity change: No  unexpected weight change: No  rhinorrhea: No  trouble swallowing: No  visual disturbance: No  chest tightness: No  polyuria: No  difficulty urinating: No  menstrual problem: No  joint swelling: No  arthralgias: Yes  confusion: No  dysphoric mood: No

## 2023-09-12 NOTE — TELEPHONE ENCOUNTER
----- Message from Irasema Jaquez MD sent at 9/12/2023  7:39 AM CDT -----  3 week virtual follow up

## 2023-09-18 ENCOUNTER — PATIENT MESSAGE (OUTPATIENT)
Dept: PRIMARY CARE CLINIC | Facility: CLINIC | Age: 28
End: 2023-09-18
Payer: COMMERCIAL

## 2023-09-18 DIAGNOSIS — M25.532 PAIN IN BOTH WRISTS: Primary | ICD-10-CM

## 2023-09-18 DIAGNOSIS — M25.531 PAIN IN BOTH WRISTS: Primary | ICD-10-CM

## 2023-09-19 NOTE — TELEPHONE ENCOUNTER
I have signed for the following orders AND/OR meds.  Please call the patient and ask the patient to schedule the testing AND/OR inform about any medications that were sent. Medications have been sent to pharmacy listed below      Orders Placed This Encounter   Procedures    Ambulatory referral/consult to Orthopedics     Standing Status:   Future     Standing Expiration Date:   10/19/2024     Referral Priority:   Routine     Referral Type:   Consultation     Requested Specialty:   Orthopedic Surgery     Number of Visits Requested:   1              [id number] - [place] - [address] [facility] [address] [address]  Phone: [phone] Fax: [phone]

## 2023-09-23 DIAGNOSIS — M25.531 BILATERAL WRIST PAIN: Primary | ICD-10-CM

## 2023-09-23 DIAGNOSIS — M25.532 BILATERAL WRIST PAIN: Primary | ICD-10-CM

## 2023-09-23 NOTE — PROGRESS NOTES
Orthopaedics Sports Medicine     Bilateral Wrist Initial Visit         9/26/2023    Referring MD: Irasema Jaquez MD    Chief Complaint   Patient presents with    Left Wrist - Pain    Right Wrist - Pain         History of Present Illness:   Alyson Vila is a 27 y.o. female who presents with bilateral wrist pain and dysfunction. She was seen by Dr. Irasema Jaquez on 9/12/23 for this issue at which time she noted history of tendonitis in the past. She noted seeing a chiropractor who had been treating her and gave her some exercises. She had also tried some tylenol for symptoms but with no improvement. She had not tried anything else for treatment.     Onset of the symptoms was approximately 3-4 weeks ago.     Inciting event: No specific injury or trauma.      Current symptoms include bilateral wrist pain, R>L. She reports the pain at times goes from her last 3 fingers and up arm to shoulder at times.  She also notes clicking in the wrist with movement.     Pain is aggravated by movement      She was seen by Dr. Irasema Jaquez on 9/12/23 for this issue at which time she noted history of tendonitis in the past. She noted seeing a chiropractor who had been treating her and gave her some exercises. She had also tried some tylenol for symptoms but with no improvement. She had not tried anything else for treatment. Dr. Jaquez recommened she continue home exercises, wear wrist brace, ice application, and started on Mobic.     Past Medical History:   Past Medical History:   Diagnosis Date    PCOS (polycystic ovarian syndrome)        Past Surgical History:   History reviewed. No pertinent surgical history.    Medications:  Patient's Medications   New Prescriptions    No medications on file   Previous Medications    AZELASTINE (ASTELIN) 137 MCG (0.1 %) NASAL SPRAY    1 spray (137 mcg total) by Nasal route 2 (two) times daily. for 7 days    DESVENLAFAXINE SUCCINATE (PRISTIQ) 100 MG TB24    Take 1 tablet (100 mg total) by mouth once  "daily.    LEVOCETIRIZINE (XYZAL) 5 MG TABLET    Take 1 tablet (5 mg total) by mouth once daily. levocetirizine 5 mg tablet    MELOXICAM (MOBIC) 15 MG TABLET    Take 1 tablet (15 mg total) by mouth once daily.    NORETHINDRONE-E.ESTRADIOL-IRON (MINASTRIN 24 FE) 1 MG-20 MCG(24) /75 MG (4) CHEW    Take 1 tablet by mouth once daily.    SEMAGLUTIDE (OZEMPIC) 1 MG/DOSE (4 MG/3 ML)    Inject 1 mg into the skin every 7 days.   Modified Medications    No medications on file   Discontinued Medications    No medications on file       Allergies:   Review of patient's allergies indicates:   Allergen Reactions    Grass pollen-sweet vernal grass, std. Itching and Tinitus    Ragweed Itching and Tinitus       Social History:   Home town: [place]  Occupation:  and surgical   Alcohol use: She reports current alcohol use.  Tobacco use: She reports that she has never smoked. She does not have any smokeless tobacco history on file.    Review of systems:  History of recent illness, fevers, shakes, or chills: no  History of cardiac problems or chest pain: no  History of pulmonary problems or asthma: no  History of diabetes: no  History of prior dvt or clotting problems: no  History of sleep apnea: no      Physical Examination:  Estimated body mass index is 39.51 kg/m² as calculated from the following:    Height as of this encounter: 5' 2" (1.575 m).    Weight as of this encounter: 98 kg (216 lb).    General  Healthy appearing female in no acute distress  Alert and oriented, normal mood, appropriate affect    Ortho Exam    Right and Left Wrist:    Inspection: Normal    Palpation: None     Range of motion: Normal, equivocal     Strength:  5/5 Flexion    5/5 Extension    5/5 Supination     5/5 Pronation    Special Tests: Neg Finkelstein    Some pain noted with forced flexion of both, pain localized to dorsum of wrist    + Median nerve compression     Negative Tinel's at wrist and elbow      N/V Exam:  Radial: Normal " motor (EPL/thumbs up)              Normal sensory (dorsal hand)   Median: Normal motor (FPL/A-OK)      Normal sensory (thumb)   Ulnar:  Normal motor (Interossei/scissors-spread)     Normal sensory (5th finger)   LABC: Normal sensory (lateral forearm)   MABC: Normal sensory (medial forearm)   MC: Normal motor (elbow flexion)   Axillary: Normal motor/sensory (deltoid)  Normal radial and ulnar pulses, warm and well perfused with capillary refill < 2 sec               Imaging:   X-Ray Wrist Complete Bilateral  Narrative: EXAM: XR WRIST COMPLETE 3 VIEWS BILATERAL    CLINICAL HISTORY: Right and left wrist pain.    TECHNIQUE: 5 views of each wrist performed.    FINDINGS:  No fracture or dislocation.  No arthritic change.  Soft tissue structures appear normal.  Impression:  Normal study.    Finalized on: 9/26/2023 3:43 PM By:  Kevin Cardona MD  [facility]# [id number]      2023-09-26 15:45:[id number]    [facility]         Physician Read: I agree with the above impression.      Impression:  27 y.o. female with left and right wrist pain, possible carpal tunnel syndrome, possible TFCC injury       Plan:  Discussed diagnosis and treatment options with the patient today.  Her history and physical exam were consistent with both carpal tunnel syndrome and TFCC pathology.  I recommend proceeding with referral to occupational therapy and referral for EMG to rule out potential nerve causes. Advised to continue with wrist brace and especially trying to wear it at night. The patient is in agreement with this plan.   Referral for EMG and for occupational therapy placed today.   Follow up with me as needed. If symptoms persist, then will likely arrange follow up with hand surgery to discuss further treatment options.           Jamal Fitzgerald MD    I, Clinton Plunkett, acted as a scribe for Jamal Fitzgerald MD for the duration of this office visit.

## 2023-09-26 ENCOUNTER — OFFICE VISIT (OUTPATIENT)
Dept: SPORTS MEDICINE | Facility: CLINIC | Age: 28
End: 2023-09-26
Payer: COMMERCIAL

## 2023-09-26 ENCOUNTER — HOSPITAL ENCOUNTER (OUTPATIENT)
Dept: RADIOLOGY | Facility: HOSPITAL | Age: 28
Discharge: HOME | End: 2023-09-26
Attending: STUDENT IN AN ORGANIZED HEALTH CARE EDUCATION/TRAINING PROGRAM
Payer: COMMERCIAL

## 2023-09-26 VITALS — BODY MASS INDEX: 39.75 KG/M2 | RESPIRATION RATE: 17 BRPM | HEIGHT: 62 IN | WEIGHT: 216 LBS

## 2023-09-26 DIAGNOSIS — S69.80XA INJURY OF TRIANGULAR FIBROCARTILAGE COMPLEX (TFCC), UNSPECIFIED LATERALITY, INITIAL ENCOUNTER: ICD-10-CM

## 2023-09-26 DIAGNOSIS — M25.531 BILATERAL WRIST PAIN: ICD-10-CM

## 2023-09-26 DIAGNOSIS — M25.532 PAIN IN BOTH WRISTS: ICD-10-CM

## 2023-09-26 DIAGNOSIS — M25.531 PAIN IN BOTH WRISTS: ICD-10-CM

## 2023-09-26 DIAGNOSIS — G56.03 BILATERAL CARPAL TUNNEL SYNDROME: Primary | ICD-10-CM

## 2023-09-26 DIAGNOSIS — M25.532 BILATERAL WRIST PAIN: ICD-10-CM

## 2023-09-26 PROCEDURE — 73110 XR WRIST COMPLETE 3 VIEWS BILATERAL: ICD-10-PCS | Mod: 26,50,, | Performed by: RADIOLOGY

## 2023-09-26 PROCEDURE — 1160F PR REVIEW ALL MEDS BY PRESCRIBER/CLIN PHARMACIST DOCUMENTED: ICD-10-PCS | Mod: CPTII,S$GLB,, | Performed by: STUDENT IN AN ORGANIZED HEALTH CARE EDUCATION/TRAINING PROGRAM

## 2023-09-26 PROCEDURE — 73110 X-RAY EXAM OF WRIST: CPT | Mod: TC,50

## 2023-09-26 PROCEDURE — 3008F PR BODY MASS INDEX (BMI) DOCUMENTED: ICD-10-PCS | Mod: CPTII,S$GLB,, | Performed by: STUDENT IN AN ORGANIZED HEALTH CARE EDUCATION/TRAINING PROGRAM

## 2023-09-26 PROCEDURE — 1160F RVW MEDS BY RX/DR IN RCRD: CPT | Mod: CPTII,S$GLB,, | Performed by: STUDENT IN AN ORGANIZED HEALTH CARE EDUCATION/TRAINING PROGRAM

## 2023-09-26 PROCEDURE — 3008F BODY MASS INDEX DOCD: CPT | Mod: CPTII,S$GLB,, | Performed by: STUDENT IN AN ORGANIZED HEALTH CARE EDUCATION/TRAINING PROGRAM

## 2023-09-26 PROCEDURE — 73110 X-RAY EXAM OF WRIST: CPT | Mod: 26,50,, | Performed by: RADIOLOGY

## 2023-09-26 PROCEDURE — 99203 PR OFFICE/OUTPT VISIT, NEW, LEVL III, 30-44 MIN: ICD-10-PCS | Mod: S$GLB,,, | Performed by: STUDENT IN AN ORGANIZED HEALTH CARE EDUCATION/TRAINING PROGRAM

## 2023-09-26 PROCEDURE — 99203 OFFICE O/P NEW LOW 30 MIN: CPT | Mod: S$GLB,,, | Performed by: STUDENT IN AN ORGANIZED HEALTH CARE EDUCATION/TRAINING PROGRAM

## 2023-09-26 PROCEDURE — 99999 PR PBB SHADOW E&M-EST. PATIENT-LVL IV: CPT | Mod: PBBFAC,,, | Performed by: STUDENT IN AN ORGANIZED HEALTH CARE EDUCATION/TRAINING PROGRAM

## 2023-09-26 PROCEDURE — 99999 PR PBB SHADOW E&M-EST. PATIENT-LVL IV: ICD-10-PCS | Mod: PBBFAC,,, | Performed by: STUDENT IN AN ORGANIZED HEALTH CARE EDUCATION/TRAINING PROGRAM

## 2023-09-26 PROCEDURE — 1159F MED LIST DOCD IN RCRD: CPT | Mod: CPTII,S$GLB,, | Performed by: STUDENT IN AN ORGANIZED HEALTH CARE EDUCATION/TRAINING PROGRAM

## 2023-09-26 PROCEDURE — 1159F PR MEDICATION LIST DOCUMENTED IN MEDICAL RECORD: ICD-10-PCS | Mod: CPTII,S$GLB,, | Performed by: STUDENT IN AN ORGANIZED HEALTH CARE EDUCATION/TRAINING PROGRAM

## 2023-09-27 ENCOUNTER — PATIENT MESSAGE (OUTPATIENT)
Dept: PHYSICAL MEDICINE AND REHAB | Facility: CLINIC | Age: 28
End: 2023-09-27
Payer: COMMERCIAL

## 2023-10-01 ENCOUNTER — PATIENT MESSAGE (OUTPATIENT)
Dept: ENDOCRINOLOGY | Facility: CLINIC | Age: 28
End: 2023-10-01
Payer: COMMERCIAL

## 2023-10-02 RX ORDER — SEMAGLUTIDE 1.34 MG/ML
1 INJECTION, SOLUTION SUBCUTANEOUS
Qty: 3 ML | Refills: 11 | Status: SHIPPED | OUTPATIENT
Start: 2023-10-02 | End: 2024-02-20 | Stop reason: SDUPTHER

## 2023-10-03 ENCOUNTER — TELEPHONE (OUTPATIENT)
Dept: FAMILY MEDICINE | Facility: CLINIC | Age: 28
End: 2023-10-03
Payer: COMMERCIAL

## 2023-10-03 ENCOUNTER — OFFICE VISIT (OUTPATIENT)
Dept: PRIMARY CARE CLINIC | Facility: CLINIC | Age: 28
End: 2023-10-03
Payer: COMMERCIAL

## 2023-10-03 ENCOUNTER — CLINICAL SUPPORT (OUTPATIENT)
Dept: REHABILITATION | Facility: HOSPITAL | Age: 28
End: 2023-10-03
Attending: STUDENT IN AN ORGANIZED HEALTH CARE EDUCATION/TRAINING PROGRAM
Payer: COMMERCIAL

## 2023-10-03 DIAGNOSIS — F98.8 ATTENTION DEFICIT DISORDER (ADD) WITHOUT HYPERACTIVITY: Primary | ICD-10-CM

## 2023-10-03 DIAGNOSIS — Z78.9 DECREASED ACTIVITIES OF DAILY LIVING (ADL): ICD-10-CM

## 2023-10-03 DIAGNOSIS — M25.631 DECREASED RANGE OF MOTION OF RIGHT WRIST: Primary | ICD-10-CM

## 2023-10-03 DIAGNOSIS — R29.898 DECREASED GRIP STRENGTH: ICD-10-CM

## 2023-10-03 DIAGNOSIS — R20.2 PARESTHESIA OF BOTH HANDS: ICD-10-CM

## 2023-10-03 DIAGNOSIS — M25.532 PAIN IN BOTH WRISTS: ICD-10-CM

## 2023-10-03 DIAGNOSIS — M25.531 PAIN IN BOTH WRISTS: ICD-10-CM

## 2023-10-03 DIAGNOSIS — F33.0 MILD EPISODE OF RECURRENT MAJOR DEPRESSIVE DISORDER: ICD-10-CM

## 2023-10-03 DIAGNOSIS — F41.1 GAD (GENERALIZED ANXIETY DISORDER): ICD-10-CM

## 2023-10-03 PROCEDURE — 99214 OFFICE O/P EST MOD 30 MIN: CPT | Mod: 95,,, | Performed by: INTERNAL MEDICINE

## 2023-10-03 PROCEDURE — 97110 THERAPEUTIC EXERCISES: CPT | Mod: PO

## 2023-10-03 PROCEDURE — 99214 PR OFFICE/OUTPT VISIT, EST, LEVL IV, 30-39 MIN: ICD-10-PCS | Mod: 95,,, | Performed by: INTERNAL MEDICINE

## 2023-10-03 PROCEDURE — 97166 OT EVAL MOD COMPLEX 45 MIN: CPT | Mod: PO

## 2023-10-03 RX ORDER — DEXTROAMPHETAMINE SACCHARATE, AMPHETAMINE ASPARTATE MONOHYDRATE, DEXTROAMPHETAMINE SULFATE AND AMPHETAMINE SULFATE 2.5; 2.5; 2.5; 2.5 MG/1; MG/1; MG/1; MG/1
10 CAPSULE, EXTENDED RELEASE ORAL EVERY MORNING
Qty: 30 CAPSULE | Refills: 0 | Status: SHIPPED | OUTPATIENT
Start: 2023-10-03 | End: 2023-11-07

## 2023-10-03 RX ORDER — DESVENLAFAXINE 100 MG/1
100 TABLET, EXTENDED RELEASE ORAL DAILY
Qty: 90 TABLET | Refills: 3 | Status: SHIPPED | OUTPATIENT
Start: 2023-10-03 | End: 2024-10-02

## 2023-10-03 NOTE — TELEPHONE ENCOUNTER
----- Message from Irasema Jaquez MD sent at 10/3/2023  9:03 AM CDT -----  Please schedule virtual f/u 4 weeks

## 2023-10-03 NOTE — PROGRESS NOTES
Primary Care Telemedicine Note  The patient location is:  Patient Home   The chief complaint leading to consultation is: depression f/u/ADD treatment  Total time spent with patient: 15 min    Visit type: Virtual visit with synchronous audio only and video  Each patient to whom he or she provides medical services by telemedicine is:  (1) informed of the relationship between the physician and patient and the respective role of any other health care provider with respect to management of the patient; and (2) notified that he or she may decline to receive medical services by telemedicine and may withdraw from such care at any time.      Assessment/Plan:    Problem List Items Addressed This Visit          Psychiatric    Attention deficit disorder (ADD) without hyperactivity - Primary    Overview     -therapist has conducted testing suggesting diagnosis of ADD  -now that depression/anxiety treatment established and stable, will start on treatment for ADD  -discussed potential treatment options  -plan to start on low dose stimulant, adderall xr 10 mg  -medication information provided, including potential AE  -will arrange close follow up to discuss treatment response but instructed to reach out to me soon if she has any issues with medications  -The patient was checked in the [facility] Board of Pharmacy's  Prescription Monitoring Program. There appears to be no incongruities with the patient's verbalized history.           Relevant Medications    dextroamphetamine-amphetamine (ADDERALL XR) 10 MG 24 hr capsule    GAL (generalized anxiety disorder)    Relevant Medications    desvenlafaxine succinate (PRISTIQ) 100 MG Tb24    Mild episode of recurrent major depressive disorder    Overview     -acute on chronic anxiety and depression symptoms  -following with therapist but she has suggested that patient try medication for symptoms  -currently on pristiq 100 mg QD with improvement of symptoms and without AE  -she has been  on zoloft, wellbutrin, prozac in the past with AE  -discussed risk of discontinuing this medication without tapering  -patient was educated, advised of side effects, and all questions were answered.  Patient voiced understanding  -patient will follow up routinely and notify us if having any side effects or worsening or persistent symptoms.  ER precautions were given.           Relevant Medications    desvenlafaxine succinate (PRISTIQ) 100 MG Tb24       Follow up in about 4 weeks (around 10/31/2023) for tele f/u me.    Irasema Jaquez MD  _____________________________________________________________________________________________________________________________________________________    CC: depression follow up/ADD treatment    HPI:    Patient is an established patient who presents today via virtual visit.    Since last visit, increased Pristiq from 50 mg to 100 mg and she has seen continued improvement in her anxiety and depression symptoms. She has not noticed any AE since increasing dose. She would like to continue current medication. Now that depression is controlled, she would like to discuss starting on treatment for ADD. She had been evaluated by therapist and diagnosed with ADD due to symptoms of difficulty concentrating and completing tasks. She has never been on treatment in the past. She is interested in starting on treatment with stimulant therapy. She has no hx of HTN or cardiac issues.     No other new complaints today.      Past Medical History:  Past Medical History:   Diagnosis Date    PCOS (polycystic ovarian syndrome)      No past surgical history on file.  Review of patient's allergies indicates:   Allergen Reactions    Grass pollen-sweet vernal grass, std. Itching and Tinitus    Ragweed Itching and Tinitus     Social History     Tobacco Use    Smoking status: Never   Substance Use Topics    Alcohol use: Yes     Comment: Socially only. Maybe 2x per month    Drug use: Never     Family History    Problem Relation Age of Onset    Alcohol abuse Mother     Cervical cancer Mother      Current Outpatient Medications on File Prior to Visit   Medication Sig Dispense Refill    azelastine (ASTELIN) 137 mcg (0.1 %) nasal spray 1 spray (137 mcg total) by Nasal route 2 (two) times daily. for 7 days 30 mL 0    levocetirizine (XYZAL) 5 MG tablet Take 1 tablet (5 mg total) by mouth once daily. levocetirizine 5 mg tablet 90 tablet 0    meloxicam (MOBIC) 15 MG tablet Take 1 tablet (15 mg total) by mouth once daily. 30 tablet 0    norethindrone-e.estradioL-iron (MINASTRIN 24 FE) 1 mg-20 mcg(24) /75 mg (4) Chew Take 1 tablet by mouth once daily.      semaglutide (OZEMPIC) 1 mg/dose (4 mg/3 mL) Inject 1 mg into the skin every 7 days. 3 mL 11    [DISCONTINUED] desvenlafaxine succinate (PRISTIQ) 100 MG Tb24 Take 1 tablet (100 mg total) by mouth once daily. 30 tablet 1     No current facility-administered medications on file prior to visit.       Review of Systems   Constitutional:  Negative for chills, fever and malaise/fatigue.   HENT:  Negative for hearing loss.    Eyes:  Negative for discharge.   Respiratory:  Negative for cough, shortness of breath and wheezing.    Cardiovascular:  Negative for chest pain, palpitations and leg swelling.   Gastrointestinal:  Negative for abdominal pain, blood in stool, constipation, diarrhea and vomiting.   Genitourinary:  Negative for dysuria, frequency and hematuria.   Musculoskeletal:  Negative for back pain, joint pain and neck pain.   Neurological:  Negative for weakness and headaches.   Endo/Heme/Allergies:  Negative for polydipsia.   Psychiatric/Behavioral:  Negative for depression. The patient is not nervous/anxious.          Physical Exam        No data to display                   No data to display                       No data to display                Physical Exam  Constitutional:       General: She is not in acute distress.     Appearance: She is well-developed.   HENT:       Head: Normocephalic and atraumatic.   Pulmonary:      Effort: Pulmonary effort is normal. No respiratory distress.   Abdominal:      General: There is no distension.   Musculoskeletal:         General: Normal range of motion.      Cervical back: Normal range of motion.   Neurological:      Mental Status: She is alert and oriented to person, place, and time.   Psychiatric:         Mood and Affect: Mood normal.         Behavior: Behavior normal.         Thought Content: Thought content normal.         Judgment: Judgment normal.         The patient's Health Maintenance was reviewed and the following appears to be due at this time:  Health Maintenance Due   Topic Date Due    COVID-19 Vaccine (3 - Moderna series) 06/08/2021    Influenza Vaccine (1) 09/01/2023     Answers submitted by the patient for this visit:  Review of Systems Questionnaire (Submitted on 10/1/2023)  activity change: No  unexpected weight change: Yes  rhinorrhea: No  trouble swallowing: No  visual disturbance: No  chest tightness: No  polyuria: No  difficulty urinating: No  menstrual problem: No  joint swelling: No  arthralgias: No  confusion: No  dysphoric mood: No

## 2023-10-03 NOTE — PLAN OF CARE
[facility]  Occupational Therapy Initial Evaluation     Name: Alyson Vila  Clinic Number: [id number]    Therapy Diagnosis:    B wrist pain, decreased ROM R wrist and FA, decreased  B hands, decreased ADL, paresthesias B hands   Physician: Jamal Fitzgerald*    Physician Orders: Please eval and treat for bilateral wrist pain, possible CTS and possible TFCC involvement  Medical Diagnosis: M25.531,M25.532 (ICD-10-CM) - Pain in both wrists    Evaluation Date: 10/3/2023  Insurance Authorization Period Expiration: 9/25/2024  Plan of Care Certification Period: 1/1/2024  Date of Return to MD: TBD  Visit # / Visits authorized: 1 / 1  FOTO: Initial/56    Time In:1100  Time Out: 1200  Total Appointment Time (timed & untimed codes): 60 minutes    Surgical Procedure:   N/A    Precautions: Standard     Date of Onset: last week Sept 2023    S/P: subacute     Subjective       History of Current Condition/Mechanism of Injury:  Pt reports onset of symptoms approx one month prior. Began with radial and ulnar wrist pain in R with shooting pains in volar and dorsal central wrists into FA. Also radiates to elbow and into shoulder. Began in left as she used it more in attempt to rest R. Pt also reports paresthesias in thumb, index, and middle fingers and occasionally ring and small fingers. Pt has been taking Meloxicam, which is helping.  Clicking reported at ulnar wrist. Pt is scheduled for a NCS.     Falls: n/a    Involved Side: Bilateral  Dominant Side: Right      Imaging: X rays: No fracture or dislocation.  No arthritic change.  Soft tissue structures appear normal.  Prior Therapy: none    Pain:  Functional Pain Scale Rating 0-10:   1-2/10 now  0/10 at best  6/10 at worst  Location: B radial and ulnar wrist, central wrist and FA pain   Description: Shooting and clicking  Aggravating Factors: holding leashes   Easing Factors: wears brace at night on R and uses cold compresses, which has  helped     Occupation:    Working presently: employed  Duties: restrains dogs, lifting, unpacking boxes      Functional Limitations/Social History:    Previous functional status includes: Independent with all ADLs.     Current Functional Status   Home/Living environment: lives alone         Limitation of Functional Status as follows:      ADLs/IADLs: Difficulty with carrying grocery bags, household chores    - Feeding: I    - Bathing: I     - Dressing/Grooming: brushing hair is difficult     - Driving: I, using L due to pain on R      Leisure: gym workouts are limited, used to do weights     Patient's Goals for Therapy: return to regular activity without pain     Past Medical History/Physical Systems Review:   Alyson Vila  has a past medical history of PCOS (polycystic ovarian syndrome).    Alyson Vila  has no past surgical history on file.    Alyson has a current medication list which includes the following prescription(s): azelastine, desvenlafaxine succinate, dextroamphetamine-amphetamine, levocetirizine, meloxicam, norethindrone-e.estradiol-iron, and ozempic.    Review of patient's allergies indicates:   Allergen Reactions    Grass pollen-sweet vernal grass, std. Itching and Tinitus    Ragweed Itching and Tinitus        Objective     Observation/Appearance:   Tenderness to palpation: On right: floor of anatomical snuffbox, just inferior to lateral epicondyle, just distal to ulnar head, fovea  On left: 1st DC, floor of anatomical snuffbox, medial epicondyle     Sensation: Intact to light touch. Paresthesias reported frequently in B thumb, index, and middle fingers and occasionally on ring and small fingers.         Edema:            (in cm) L R   Proximal Wrist Crease 15.9 15.7   MPs     Thumb Proximal Phalanx 6.0 5.7         Range of Motion:       Wrist ROM   Left Right         Supination/Pronation 85/90 72/90   Wrist ext/flex 88/71 79/63   Wrist RD/UD 30/30 23/46                           Manual  Muscle Test:  APL B 5/5, not painful; L wrist flexion 5/5;   R wrist extension 5/5; L ponation 5/5                                           and Pinch Strength (in pounds, psi's):   Left Right    II 39 45    III     Lateral 15 16   Tripod 11 13   Tip 7 10           Special Tests: (+) Finklestein's B; (-) Hitchhikers B; (-) TFCC axial load and central disc shear test B; (-) DRUJ stability test on R; pain with DRUJ instability test to L in supination but no laxity noted; (-) Tinel's B CT and CuT and DRSN; (-) Crozen's, Steward, and MF on R; (-) pain with resisted flexion flexor pronator mass or passive stretch on L;       CMS Impairment/Limitation/Restriction for FOTO  Survey    Therapist reviewed FOTO scores for Alyson Vila on 10/3/2023.   FOTO documents entered into [employer] - see Media section.    Initial Score: 56  Predicted Score: 69 at visit 11.          Treatment     Total Treatment time separate from Evaluation time: 20 min    Alyson received therapeutic exercises to increase ROM, endurance, and/or strength for 20 minutes including:  -Decompression exercises: thumb IPJ block, jt blocks fingers, wave, hook, fist, straight fist, B hands  -median nerve glide B 2 reps each   -Staged Finklestein's stretch B  -extrinsic extensor and flexor stretches on L   -cross body stretch on R      Patient Education and Home Exercises      Education provided:   -role of OT, goals for OT,   -Additional Education provided: use of cold pack 8-10 min     Written Home Exercises Provided:   Exercises were reviewed and Alyson was able to demonstrate them prior to the end of the session.    Alyson demonstrated good understanding of the education provided.     Pt was advised to perform these exercises free of pain, and to stop performing them if pain occurs.    See EMR under Patient Instructions for exercises provided 10/3/2023.    Assessment     Alyson Vila is a 27 y.o. female referred to outpatient occupational therapy  and presents with a medical diagnosis of B wrist pain (rule out TFCC and CTS),  resulting in Paresthesias, pain,, decreased AROM R wrist and FA, decreased  strength B (compared to gender and age norms), and functional use of B UE's.    Following medical record review it is determined that pt will benefit from occupational therapy services in order to maximize pain free and/or functional use of bilateral UE.  The following goals were discussed with the patient and patient is in agreement with them as to be addressed in the treatment plan. The patient's rehab potential is Good.    Anticipated barriers to occupational therapy: symptoms in multiple locations throughout B UE's    Plan of care discussed with patient: Yes  Patient's spiritual, cultural and educational needs considered and patient is agreeable to the plan of care and goals as stated below:     Medical Necessity is demonstrated by the following  Occupational Profile/History  Co-morbidities and personal factors that may impact the plan of care [] LOW: Brief chart review  [x] MODERATE: Expanded chart review   [] HIGH: Extensive chart review    Moderate / High Support Documentation: reviewed referral, MD appointments, imaging,      Examination  Performance deficits relating to physical, cognitive or psychosocial skills that result in activity limitations and/or participation restrictions  [] LOW: addressing 1-3 Performance deficits  [] MODERATE: 3-5 Performance deficits  [x] HIGH: 5+ Performance deficits (please support below)    Moderate / High Support Documentation:     Physical:  Joint Mobility  Muscle Power/Strength  Muscle Endurance   Strength  Pain  Paresthesias     Cognitive:  No Deficits    Psychosocial:    No Deficits     Treatment Options [] LOW: Limited options  [x] MODERATE: Several options  [] HIGH: Multiple options      Decision Making/ Complexity Score: moderate       The following goals were discussed with the patient and patient is in  agreement with them as to be addressed in the treatment plan.     Goals:   Short Term Goals: (4 weeks)  1. Pt will be independent with HEP in 2 visits.  2. Pt will report decreased pain to a 3-4/10 at worst.  3. Pt will  increase R wrist E/F AROM by 5 degrees to enable dressing, grooming activities.  4.Pt will increase R forearm supination by 10 degrees to enable typing, washing face, holding a plate.   5. Pt will report a decrease in frequency or intensity of paresthesias B hands.     Long Term Goals: (by discharge)  1. Pt will report decreased pain to 1-2/10 with ADLs.   2. Pt will increase B  strength to 45-50#  to allow a firm grasp on cooking utensils, steering wheel, etc.  3. Pt will report resolution of paresthesias B hands.   4. Pt will exhibit a significant increase (13+ points) in the FOTO assessment.  5. Pt will return to PLOF.  6. Pt will be I with symptom management for future exacerbations.       Plan   Certification Period/Plan of care expiration: 10/3/2023 to 1/1/2024.    Outpatient Occupational Therapy 1-2 times weekly (per pt schedule)  for 6-8 weeks to include the following interventions:     Modalities to decrease pain (moist heat, paraffin, fluidotherapy, US ), edema control,  soft tissue mobilization, manual therapy/joint mobilizations,A/PROM, therapeutic exercises/activities, strengthening, desensitization/sensory re-education, orthotic fitting/fabrication/training PRN, joint protections/energry conservation/adaptive equipment/activity modification  HEP/education as well as any other treatments deemed necessary based on the patient's needs or progress.    Updates Next Visit: review HEP,     JORGE Hassan, CHT

## 2023-10-05 ENCOUNTER — OFFICE VISIT (OUTPATIENT)
Dept: PHYSICAL MEDICINE AND REHAB | Facility: CLINIC | Age: 28
End: 2023-10-05
Payer: COMMERCIAL

## 2023-10-05 VITALS — BODY MASS INDEX: 39.75 KG/M2 | RESPIRATION RATE: 13 BRPM | HEIGHT: 62 IN | WEIGHT: 216 LBS

## 2023-10-05 DIAGNOSIS — G56.03 BILATERAL CARPAL TUNNEL SYNDROME: ICD-10-CM

## 2023-10-05 PROCEDURE — 95912 PR NERVE CONDUCTION STUDY; 11 -12 STUDIES: ICD-10-PCS | Mod: S$GLB,,, | Performed by: PHYSICAL MEDICINE & REHABILITATION

## 2023-10-05 PROCEDURE — 99999 PR PBB SHADOW E&M-EST. PATIENT-LVL III: ICD-10-PCS | Mod: PBBFAC,,, | Performed by: PHYSICAL MEDICINE & REHABILITATION

## 2023-10-05 PROCEDURE — 99499 UNLISTED E&M SERVICE: CPT | Mod: S$GLB,,, | Performed by: PHYSICAL MEDICINE & REHABILITATION

## 2023-10-05 PROCEDURE — 99499 NO LOS: ICD-10-PCS | Mod: S$GLB,,, | Performed by: PHYSICAL MEDICINE & REHABILITATION

## 2023-10-05 PROCEDURE — 99999 PR PBB SHADOW E&M-EST. PATIENT-LVL III: CPT | Mod: PBBFAC,,, | Performed by: PHYSICAL MEDICINE & REHABILITATION

## 2023-10-05 PROCEDURE — 95912 NRV CNDJ TEST 11-12 STUDIES: CPT | Mod: S$GLB,,, | Performed by: PHYSICAL MEDICINE & REHABILITATION

## 2023-10-05 NOTE — PROGRESS NOTES
[facility]   [address]  [place] [address]  Phone: [phone]        Full Name: Alyson Vila YOB: 1995  Patient ID: [id number]      Visit Date: 10/5/2023 14:48  Age: 27 Years 10 Months Old  Examining Physician: Lucy Roper M.D.  Referring Physician: Dr Fitzgerald  Reason for Referral: upper ext        Chief Complaint   Patient presents with    Hand Pain     Numbness and achy pain       HPI: This is a 27 y.o.  female being seen in clinic today for evaluation of a one month wrist/hand achy pain and numbness-worse on the left today.  Her symptoms have improved with rest, bracing, and meloxicam.  With increased hand usage or certain positions her symptoms have worsened.    History obtained from patient    Past family, medical, social, and surgical history reviewed in chart    Review of Systems:     General- denies lethargy, weight change, fever, chills  Head/neck- denies swallowing difficulties  ENT- denies hearing changes  Cardiovascular-denies chest pain  Pulmonary- denies shortness of breath  GI- denies constipation or bowel incontinence  - denies bladder incontinence  Skin- denies wounds or rashes  Musculoskeletal- denies weakness, + pain  Neurologic- + numbness and tingling  Psychiatric- denies depressive or psychotic features, denies anxiety  Lymphatic-denies swelling  Endocrine- denies hypoglycemic symptoms/DM history  All other pertinent systems negative     Physical Examination:  General: Well developed, well nourished female, NAD  HEENT:NCAT EOMI bilaterally   Pulmonary:Normal respirations    Spinal Examination: CERVICAL  Active ROM is within normal limits.  Inspection: No deformity of spinal alignment.  Palpation: No vertebral tenderness to percussion.      Musculoskeletal Tests:  Phalen: +  Elbow compression (ulnar): neg  Tinels at wrist: neg    Bilateral Upper and Lower Extremities:  Pulses are 2+ at radial bilaterally.  Shoulder/Elbow/Wrist/Hand ROM  wnl  Hip/Knee/Ankle ROM   Bilateral Extremities show normal capillary refill.  No signs of cyanosis, rubor, edema, skin changes, or dysvascular changes of appendages.  Nails appear intact.    Neurological Exam:  Cranial Nerves:  II-XII grossly intact    Manual Muscle Testing: (Motor 5=normal)  5/5 strength bilateral upper extremities    No focal atrophy is noted of either upper extremity.    Bilateral Reflexes:  Wade's response is absent bilaterally.  No clonus at knee or ankle.    Sensation: tested to light touch  - intact in arms    Gait: Narrow base and good arm swing.      Entire procedure explained to patient prior to proceeding.  Verbal consent obtained      SNC      Nerve / Sites Rec. Site Onset Lat Peak Lat Amp Segments Distance Velocity     ms ms µV  mm m/s   L Median - Digit II (Antidromic)      Wrist Dig II 2.6 3.3 23.3 Wrist - Dig  55   R Median - Digit II (Antidromic)      Wrist Dig II 2.8 3.4 20.5 Wrist - Dig  51   L Ulnar - Digit V (Antidromic)      Wrist Dig V 2.7 3.3 17.9 Wrist - Dig V 140 52   R Ulnar - Digit V (Antidromic)      Wrist Dig V 2.3 3.0 22.5 Wrist - Dig V 140 60   L Radial - Anatomical snuff box (Forearm)      Forearm Wrist 1.5 2.3 20.1 Forearm - Wrist 100 66   R Radial - Anatomical snuff box (Forearm)      Forearm Wrist 1.6 2.4 16.1 Forearm - Wrist 100 64       CSI      Nerve / Sites Rec. Site Peak Lat NP Amp Segments Peak Diff     ms µV  ms   L Median - CSI      Median Thumb 2.8 38.6 Median - Radial 0.5      Radial Thumb 2.2 14.0 Median - Ulnar -0.3      Median Ring 3.3 13.3 Median palm - Ulnar palm 0.2      Ulnar Ring 3.1 46.2 CSI - Thumb       Median palm Wrist 2.0 70.2 Median - Radial 1.0      Ulnar palm Wrist 1.8 35.3        CSI    CSI 1.4   R Median - CSI      Median Thumb 2.8 22.5 Median - Radial 0.1      Radial Thumb 2.7 17.8 Median - Ulnar -0.3      Median Ring 3.3 24.4 Median palm - Ulnar palm 0.6      Ulnar Ring 3.0 19.5 CSI - Thumb       Median palm Wrist 2.1  36.0 Median - Radial 0.6      Ulnar palm Wrist 1.5 33.3        CSI    CSI 1.0       MNC      Nerve / Sites Muscle Latency Amplitude Duration Rel Amp Segments Distance Lat Diff Velocity     ms mV ms %  mm ms m/s   L Median - APB      Wrist APB 2.8 9.4 5.8 100 Wrist - APB 80        Elbow APB 6.4 10.1 5.9 107 Elbow - Wrist 190 3.6 53   R Median - APB      Wrist APB 3.3 8.1 6.0 100 Wrist - APB 80        Elbow APB 7.0 7.0 6.2 85.9 Elbow - Wrist 190 3.7 51   L Ulnar - ADM      Wrist ADM 2.4 10.1 5.4 100 Wrist -         B. Elbow ADM 5.4 10.5 5.4 104 B. Elbow - Wrist 210 3.0 71      A. Elbow ADM 6.9 10.9 5.5 104 A. Elbow - B. Elbow 100 1.5 69   R Ulnar - ADM      Wrist ADM 2.2 10.7 5.0 100 Wrist -         B. Elbow ADM 5.2 9.1 5.2 85.2 B. Elbow - Wrist 210 2.9 72      A. Elbow ADM 6.5 8.3 5.4 91 A. Elbow - B. Elbow 95 1.4 70                                      INTERPRETATION  -Bilateral median motor nerve conduction study showed normal latency, amplitude, and conduction velocity  -Bilateral median sensory nerve conduction study showed normal peak latency and amplitude  -Bilateral ulnar motor nerve conduction study showed normal latency, amplitude, and conduction velocity  -Bilateral ulnar sensory nerve conduction study showed normal peak latency and amplitude  -Bilateral radial sensory nerve conduction study showed normal peak latency and amplitude  -Bilateral combined sensory index was significant    IMPRESSION  ABNORMAL study   2. There is electrodiagnostic evidence of a very mild demyelinating median neuropathy (Carpal tunnel syndrome) across bilateral wrists     PLAN  Discussed in detail for greater than 30 minutes about diagnosis and treatment plan    1. Follow up with referring provider: Dr. Jamal Barrientos*  2. Handouts on CTS provided. Cont braces and consider injections if needed.  3. This study is good for one year. If symptoms worsen or do not improve, please re-consult.    Lucy Roper  M.D.  Physical Medicine and Rehab

## 2023-10-10 DIAGNOSIS — M25.531 PAIN IN BOTH WRISTS: ICD-10-CM

## 2023-10-10 DIAGNOSIS — M25.532 PAIN IN BOTH WRISTS: ICD-10-CM

## 2023-10-10 RX ORDER — MELOXICAM 15 MG/1
15 TABLET ORAL
Qty: 30 TABLET | Refills: 0 | Status: SHIPPED | OUTPATIENT
Start: 2023-10-10 | End: 2023-11-07

## 2023-10-11 ENCOUNTER — OFFICE VISIT (OUTPATIENT)
Dept: FAMILY MEDICINE | Facility: CLINIC | Age: 28
End: 2023-10-11
Payer: COMMERCIAL

## 2023-10-11 VITALS
WEIGHT: 204.69 LBS | BODY MASS INDEX: 37.44 KG/M2 | DIASTOLIC BLOOD PRESSURE: 93 MMHG | OXYGEN SATURATION: 98 % | TEMPERATURE: 97 F | HEART RATE: 107 BPM | SYSTOLIC BLOOD PRESSURE: 130 MMHG

## 2023-10-11 DIAGNOSIS — R05.9 COUGH, UNSPECIFIED TYPE: ICD-10-CM

## 2023-10-11 DIAGNOSIS — J06.9 UPPER RESPIRATORY TRACT INFECTION, UNSPECIFIED TYPE: Primary | ICD-10-CM

## 2023-10-11 DIAGNOSIS — J02.9 PHARYNGITIS, UNSPECIFIED ETIOLOGY: ICD-10-CM

## 2023-10-11 LAB
CTP QC/QA: YES
CTP QC/QA: YES
POC MOLECULAR INFLUENZA A AGN: NEGATIVE
POC MOLECULAR INFLUENZA B AGN: NEGATIVE
SARS-COV-2 RDRP RESP QL NAA+PROBE: NEGATIVE

## 2023-10-11 PROCEDURE — 1159F PR MEDICATION LIST DOCUMENTED IN MEDICAL RECORD: ICD-10-PCS | Mod: CPTII,S$GLB,, | Performed by: NURSE PRACTITIONER

## 2023-10-11 PROCEDURE — 87502 POCT INFLUENZA A/B MOLECULAR: ICD-10-PCS | Mod: QW,S$GLB,, | Performed by: NURSE PRACTITIONER

## 2023-10-11 PROCEDURE — 1159F MED LIST DOCD IN RCRD: CPT | Mod: CPTII,S$GLB,, | Performed by: NURSE PRACTITIONER

## 2023-10-11 PROCEDURE — 3008F BODY MASS INDEX DOCD: CPT | Mod: CPTII,S$GLB,, | Performed by: NURSE PRACTITIONER

## 2023-10-11 PROCEDURE — 99213 OFFICE O/P EST LOW 20 MIN: CPT | Mod: S$GLB,,, | Performed by: NURSE PRACTITIONER

## 2023-10-11 PROCEDURE — 99999 PR PBB SHADOW E&M-EST. PATIENT-LVL IV: ICD-10-PCS | Mod: PBBFAC,,, | Performed by: NURSE PRACTITIONER

## 2023-10-11 PROCEDURE — 3008F PR BODY MASS INDEX (BMI) DOCUMENTED: ICD-10-PCS | Mod: CPTII,S$GLB,, | Performed by: NURSE PRACTITIONER

## 2023-10-11 PROCEDURE — 1160F PR REVIEW ALL MEDS BY PRESCRIBER/CLIN PHARMACIST DOCUMENTED: ICD-10-PCS | Mod: CPTII,S$GLB,, | Performed by: NURSE PRACTITIONER

## 2023-10-11 PROCEDURE — 3075F PR MOST RECENT SYSTOLIC BLOOD PRESS GE 130-139MM HG: ICD-10-PCS | Mod: CPTII,S$GLB,, | Performed by: NURSE PRACTITIONER

## 2023-10-11 PROCEDURE — 3080F DIAST BP >= 90 MM HG: CPT | Mod: CPTII,S$GLB,, | Performed by: NURSE PRACTITIONER

## 2023-10-11 PROCEDURE — 87635: ICD-10-PCS | Mod: QW,S$GLB,, | Performed by: NURSE PRACTITIONER

## 2023-10-11 PROCEDURE — 1160F RVW MEDS BY RX/DR IN RCRD: CPT | Mod: CPTII,S$GLB,, | Performed by: NURSE PRACTITIONER

## 2023-10-11 PROCEDURE — 3075F SYST BP GE 130 - 139MM HG: CPT | Mod: CPTII,S$GLB,, | Performed by: NURSE PRACTITIONER

## 2023-10-11 PROCEDURE — 99999 PR PBB SHADOW E&M-EST. PATIENT-LVL IV: CPT | Mod: PBBFAC,,, | Performed by: NURSE PRACTITIONER

## 2023-10-11 PROCEDURE — 99213 PR OFFICE/OUTPT VISIT, EST, LEVL III, 20-29 MIN: ICD-10-PCS | Mod: S$GLB,,, | Performed by: NURSE PRACTITIONER

## 2023-10-11 PROCEDURE — 87635 SARS-COV-2 COVID-19 AMP PRB: CPT | Mod: QW,S$GLB,, | Performed by: NURSE PRACTITIONER

## 2023-10-11 PROCEDURE — 87502 INFLUENZA DNA AMP PROBE: CPT | Mod: QW,S$GLB,, | Performed by: NURSE PRACTITIONER

## 2023-10-11 PROCEDURE — 3080F PR MOST RECENT DIASTOLIC BLOOD PRESSURE >= 90 MM HG: ICD-10-PCS | Mod: CPTII,S$GLB,, | Performed by: NURSE PRACTITIONER

## 2023-10-11 RX ORDER — AMOXICILLIN 875 MG/1
875 TABLET, FILM COATED ORAL EVERY 12 HOURS
Qty: 20 TABLET | Refills: 0 | Status: SHIPPED | OUTPATIENT
Start: 2023-10-11 | End: 2023-10-21

## 2023-10-11 RX ORDER — PROMETHAZINE HYDROCHLORIDE AND DEXTROMETHORPHAN HYDROBROMIDE 6.25; 15 MG/5ML; MG/5ML
5 SYRUP ORAL 2 TIMES DAILY PRN
Qty: 118 ML | Refills: 0 | Status: SHIPPED | OUTPATIENT
Start: 2023-10-11 | End: 2023-10-21

## 2023-10-11 NOTE — LETTER
October 11, 2023      [facility]  [address] [place] [address]  Phone: [phone]  Fax: [phone]       Patient: Alyson Vila   YOB: 1995  Date of Visit: 10/11/2023    To Whom It May Concern:    Katarina Vila  was at [facility] on 10/11/2023. The patient may return to school 10/16/23 with no restrictions. If you have any questions or concerns, or if I can be of further assistance, please do not hesitate to contact me.    Sincerely,    Jenniffer Gonzales LPN

## 2023-10-11 NOTE — PATIENT INSTRUCTIONS
Hydrate well  Rest  Tylenol OTC as directed  Cough medication causes drowsiness  Warm salt water gargles PRN  Report to ER immediately if symptoms worsen or persist      Alvaro Shields,     If you are due for any health screening(s) below please notify me so we can arrange them to be ordered and scheduled. Most healthy patients at your age complete them, but you are free to accept or refuse.     If you can't do it, I'll definitely understand. If you can, I'd certainly appreciate it!    All of your core healthy metrics are met.      Lets manage your high blood pressure     Your blood pressure was above 140/90 today during your visit. We recommend that you schedule a nurse visit in two weeks to check your blood pressure and discuss ways to support your health goals.     You can also manage your health and record your blood pressure from the comfort of home by keeping a daily blood pressure log. These results are shared with and reviewed by your provider. Please print this form (Daily Blood Pressure Log) to assist you in keeping track of your blood pressure at home.     Schedule your nurse visit in two weeks to learn more about how to track and manage high blood pressure.    Daily Blood Pressure Log    Name:__________________________________                  Date of Birth:_________    Average Blood Pressure:  __________      Date: Time  (a.m.) Blood  Pressure: Pulse  Rate: Time  (p.m.) Blood  Pressure : Pulse  Rate:   Sample 8:37 127/83 84

## 2023-10-11 NOTE — PROGRESS NOTES
"Subjective     Patient ID: Alyson Vila is a 27 y.o. female.    Chief Complaint: Cough, Sore Throat, Fever, and Generalized Body Aches    Sore Throat   This is a recurrent problem. The current episode started in the past 7 days. The problem has been waxing and waning. The pain is worse on the right side. Maximum temperature: Pt states felt "feverish"; did not take temp at home; states had no thermometer. The fever has been present for Less than 1 day. The pain is at a severity of 2/10. The pain is mild. Associated symptoms include congestion, coughing and swollen glands. Pertinent negatives include no abdominal pain, diarrhea, drooling, ear discharge, ear pain, headaches, hoarse voice, plugged ear sensation, neck pain, shortness of breath, stridor, trouble swallowing or vomiting. She has had no exposure to strep or mono. She has tried cool liquids for the symptoms. The treatment provided mild relief.     Past Medical History:   Diagnosis Date    PCOS (polycystic ovarian syndrome)      Social History     Socioeconomic History    Marital status: Single   Tobacco Use    Smoking status: Never   Substance and Sexual Activity    Alcohol use: Yes     Comment: Socially only. Maybe 2x per month    Drug use: Never    Sexual activity: Yes     Partners: Male     Birth control/protection: Condom, OCP     History reviewed. No pertinent surgical history.    Review of Systems   Constitutional: Negative.    HENT:  Positive for nasal congestion, sinus pressure/congestion and sore throat. Negative for drooling, ear discharge, ear pain, hoarse voice and trouble swallowing.    Eyes: Negative.    Respiratory:  Positive for cough. Negative for shortness of breath and stridor.    Cardiovascular: Negative.    Gastrointestinal: Negative.  Negative for abdominal pain, diarrhea and vomiting.   Endocrine: Negative.    Genitourinary: Negative.    Musculoskeletal: Negative.  Negative for neck pain.   Integumentary:  Negative. "   Allergic/Immunologic: Negative.    Neurological: Negative.  Negative for headaches.   Psychiatric/Behavioral: Negative.            Objective     Physical Exam  Vitals and nursing note reviewed.   Constitutional:       Appearance: Normal appearance. She is obese.   HENT:      Head: Normocephalic.      Right Ear: Tympanic membrane, ear canal and external ear normal.      Left Ear: Tympanic membrane, ear canal and external ear normal.      Nose: Nose normal.      Mouth/Throat:      Mouth: Mucous membranes are moist.      Pharynx: Oropharynx is clear.   Eyes:      Conjunctiva/sclera: Conjunctivae normal.      Pupils: Pupils are equal, round, and reactive to light.   Cardiovascular:      Rate and Rhythm: Regular rhythm. Tachycardia present.      Pulses: Normal pulses.      Heart sounds: Normal heart sounds.   Pulmonary:      Effort: Pulmonary effort is normal.      Breath sounds: Normal breath sounds.   Abdominal:      General: Bowel sounds are normal.      Palpations: Abdomen is soft.   Musculoskeletal:         General: Normal range of motion.      Cervical back: Normal range of motion and neck supple.   Skin:     General: Skin is warm and dry.   Neurological:      Mental Status: She is alert and oriented to person, place, and time.   Psychiatric:         Mood and Affect: Mood normal.         Behavior: Behavior normal.         Thought Content: Thought content normal.         Judgment: Judgment normal.            Assessment and Plan     Alyson was seen today for cough, sore throat, fever and generalized body aches.    Diagnoses and all orders for this visit:    Upper respiratory tract infection, unspecified type  Pharyngitis, unspecified etiology  Cough, unspecified type  -     POCT COVID-19 Rapid Screening  -     POCT Influenza A/B Molecular        -     promethazine-dextromethorphan (PROMETHAZINE-DM) 6.25-15 mg/5 mL Syrp; Take 5 mLs by mouth 2 (two) times daily as needed.  -     amoxicillin (AMOXIL) 875 MG tablet;  Take 1 tablet (875 mg total) by mouth every 12 (twelve) hours. for 10 days  Hydrate well  Rest  Tylenol OTC as directed  Warm salt water gargles PRN  Report to ER immediately if symptoms worsen or persist                 No follow-ups on file.

## 2023-10-24 NOTE — PROGRESS NOTES
[facility]  Occupational Therapy Treatment Note     Date: 10/26/2023  Name: Alyson Vila  Clinic Number: [id number]    Therapy Diagnosis:    B wrist pain, decreased ROM R wrist and FA, decreased  B hands, decreased ADL, paresthesias B hands   Physician: Jamal Fitzgerald*     Physician Orders: Please eval and treat for bilateral wrist pain, possible CTS and possible TFCC involvement  Medical Diagnosis: M25.531,M25.532 (ICD-10-CM) - Pain in both wrists     Evaluation Date: 10/3/2023  Insurance Authorization Period Expiration: 12/31/23  Plan of Care Certification Period: 1/1/2024  Date of Return to MD: TBD  Visit # / Visits authorized: 2 / 25  FOTO: Initial/56     Time In:1215  Time Out: 1300  Total Appointment Time (timed & untimed codes): 45 minutes     Surgical Procedure:   N/A     Precautions: Standard      Date of Onset: last week Sept 2023                                    S/P: subacute     Subjective     Pt reports: numbness persists in B IF. Still having occasional pain at R volar central wrist and FA and B writ/thumb area. Remaining areas of pain have resolved. Missed last 2 appts due to being sick and helping mom who had had sx. Has been mindful of modifying tasks as instructed.   She was  compliant with home exercise program given last session.   Response to previous treatment:Excellent  Functional change: has modified tasks and ordered a new mouse     Pain:  Functional Pain Scale Rating 0-10:   1-2/10 now  0/10 at best  3-4/10 at worst (decreased 2-3 levels)   Location: B radial and ulnar wrist, central wrist and FA pain       Objective     Objective Measures updated at progress report unless specified.      Treatment     Alyson received manual therapy  5 minutes including:  -STM R 1st DC and first web space     Alyson received therapeutic exercises to increase ROM, endurance, and/or strength for 20 minutes including:  -Decompression exercises: thumb IPJ block,  jt blocks fingers, wave, hook, fist, straight fist, B hands  -median nerve glide B 2 reps each   -Staged Finklestein's stretch B  -extrinsic extensor and flexor stretches on L   -cross body stretch on R  -Pain free: FA rotation, wrist E/F, thumb circles/reverse, P/R abd, lifts, composite flexion, and opposition with slide       Alyson received self care management for 20 minutes including:  -reviewed use of cold pack  -anatomy, precautions/aggravating factors, activity modification for pain in B 1st DC as well if ulnar wrist pain returns  -discussed specific modifications for work tasks  -proper mouse positioning and placement  -avoiding external pressure directly over CT           Patient Education and Home Exercises     Education provided:   - continue same HEP  -added painfree thumb ROM      Written Home Exercises Provided: Patient instructed to cont prior HEP.  Exercises were reviewed and Alyson was able to demonstrate them prior to the end of the session.  Alyson demonstrated good  understanding of the HEP provided. See EMR under Patient Instructions for exercises provided during therapy sessions.       Assessment     Pt would continue to benefit from skilled OT. NCS results + for very mild demyelinating CTS. Pt required correction on extrinsic stretches for proper performance. Continues to report tightness in forearms with wrist/composite wrist/finger stretches. Paresthesias have improved and persist only in B IF at this time. Ulnar wrist pain has resolved. Pt continues with some pain over CT and B 1st DC. R 1st DC pain increased with wrist RD/UD and after painfree thumb ROM exercises and decreased with gentle gravity A stretching and STM.    Alyson is progressing well towards her goals and there are no updates to goals at this time.   - Progress towards goals: Good; STG 1, 2, and 3 met     Pt prognosis is Good.=    Pt will continue to benefit from skilled outpatient occupational therapy to address the  deficits listed in the problem list on initial evaluation provide pt/family education and to maximize pt's level of independence in the home and community environment.     Pt's spiritual, cultural and educational needs considered and pt agreeable to plan of care and goals.    Anticipated barriers to occupational therapy: none symptoms in multiple locations throughout B UE's    Goals:  Short Term Goals: (4 weeks)  1. Pt will be independent with HEP in 2 visits. (Met 10/26/23)   2. Pt will report decreased pain to a 3-4/10 at worst. (Met 10/26/23)   3. Pt will  increase R wrist E/F AROM by 5 degrees to enable dressing, grooming activities.  4.Pt will increase R forearm supination by 10 degrees to enable typing, washing face, holding a plate.   5. Pt will report a decrease in frequency or intensity of paresthesias B hands. (Met 10/26/23)      Long Term Goals: (by discharge)  1. Pt will report decreased pain to 1-2/10 with ADLs.   2. Pt will increase B  strength to 45-50#  to allow a firm grasp on cooking utensils, steering wheel, etc.  3. Pt will report resolution of paresthesias B hands.   4. Pt will exhibit a significant increase (13+ points) in the FOTO assessment.  5. Pt will return to PLOF.  6. Pt will be I with symptom management for future exacerbations.     Plan     Certification Period/Plan of care expiration: 10/3/2023 to 1/1/2024.     Continue Occupational Therapy 1-2 times per week x 6-8 weeks to decrease pain and edema, and increase A/PROM, strength, and functional use of B upper extremities.     Updates/Grading for next session: progress as tolerated.    JORGE Hassan, CHT

## 2023-10-26 ENCOUNTER — CLINICAL SUPPORT (OUTPATIENT)
Dept: REHABILITATION | Facility: HOSPITAL | Age: 28
End: 2023-10-26
Payer: COMMERCIAL

## 2023-10-26 DIAGNOSIS — R20.2 PARESTHESIA OF BOTH HANDS: ICD-10-CM

## 2023-10-26 DIAGNOSIS — M25.531 PAIN IN BOTH WRISTS: Primary | ICD-10-CM

## 2023-10-26 DIAGNOSIS — M25.631 DECREASED RANGE OF MOTION OF RIGHT WRIST: ICD-10-CM

## 2023-10-26 DIAGNOSIS — R29.898 DECREASED GRIP STRENGTH: ICD-10-CM

## 2023-10-26 DIAGNOSIS — Z78.9 DECREASED ACTIVITIES OF DAILY LIVING (ADL): ICD-10-CM

## 2023-10-26 DIAGNOSIS — M25.532 PAIN IN BOTH WRISTS: Primary | ICD-10-CM

## 2023-10-26 PROCEDURE — 97535 SELF CARE MNGMENT TRAINING: CPT | Mod: PO

## 2023-10-26 PROCEDURE — 97110 THERAPEUTIC EXERCISES: CPT | Mod: PO

## 2023-10-31 ENCOUNTER — OFFICE VISIT (OUTPATIENT)
Dept: FAMILY MEDICINE | Facility: CLINIC | Age: 28
End: 2023-10-31
Payer: COMMERCIAL

## 2023-10-31 VITALS
BODY MASS INDEX: 36.58 KG/M2 | WEIGHT: 200 LBS | DIASTOLIC BLOOD PRESSURE: 86 MMHG | SYSTOLIC BLOOD PRESSURE: 124 MMHG | OXYGEN SATURATION: 100 % | TEMPERATURE: 97 F | HEART RATE: 89 BPM

## 2023-10-31 DIAGNOSIS — E04.2 MULTIPLE THYROID NODULES: ICD-10-CM

## 2023-10-31 DIAGNOSIS — Z00.00 ANNUAL PHYSICAL EXAM: Primary | ICD-10-CM

## 2023-10-31 DIAGNOSIS — F41.1 GAD (GENERALIZED ANXIETY DISORDER): ICD-10-CM

## 2023-10-31 DIAGNOSIS — J30.89 ENVIRONMENTAL AND SEASONAL ALLERGIES: ICD-10-CM

## 2023-10-31 DIAGNOSIS — F33.0 MILD EPISODE OF RECURRENT MAJOR DEPRESSIVE DISORDER: ICD-10-CM

## 2023-10-31 DIAGNOSIS — E66.01 SEVERE OBESITY (BMI 35.0-39.9) WITH COMORBIDITY: ICD-10-CM

## 2023-10-31 DIAGNOSIS — E28.2 PCOS (POLYCYSTIC OVARIAN SYNDROME): ICD-10-CM

## 2023-10-31 DIAGNOSIS — F98.8 ATTENTION DEFICIT DISORDER, UNSPECIFIED HYPERACTIVITY PRESENCE: ICD-10-CM

## 2023-10-31 PROCEDURE — 99395 PR PREVENTIVE VISIT,EST,18-39: ICD-10-PCS | Mod: S$GLB,,, | Performed by: NURSE PRACTITIONER

## 2023-10-31 PROCEDURE — 99395 PREV VISIT EST AGE 18-39: CPT | Mod: S$GLB,,, | Performed by: NURSE PRACTITIONER

## 2023-10-31 PROCEDURE — 3074F SYST BP LT 130 MM HG: CPT | Mod: CPTII,S$GLB,, | Performed by: NURSE PRACTITIONER

## 2023-10-31 PROCEDURE — 1159F MED LIST DOCD IN RCRD: CPT | Mod: CPTII,S$GLB,, | Performed by: NURSE PRACTITIONER

## 2023-10-31 PROCEDURE — 1160F RVW MEDS BY RX/DR IN RCRD: CPT | Mod: CPTII,S$GLB,, | Performed by: NURSE PRACTITIONER

## 2023-10-31 PROCEDURE — 1160F PR REVIEW ALL MEDS BY PRESCRIBER/CLIN PHARMACIST DOCUMENTED: ICD-10-PCS | Mod: CPTII,S$GLB,, | Performed by: NURSE PRACTITIONER

## 2023-10-31 PROCEDURE — 99999 PR PBB SHADOW E&M-EST. PATIENT-LVL V: CPT | Mod: PBBFAC,,, | Performed by: NURSE PRACTITIONER

## 2023-10-31 PROCEDURE — 3008F PR BODY MASS INDEX (BMI) DOCUMENTED: ICD-10-PCS | Mod: CPTII,S$GLB,, | Performed by: NURSE PRACTITIONER

## 2023-10-31 PROCEDURE — 3079F PR MOST RECENT DIASTOLIC BLOOD PRESSURE 80-89 MM HG: ICD-10-PCS | Mod: CPTII,S$GLB,, | Performed by: NURSE PRACTITIONER

## 2023-10-31 PROCEDURE — 99999 PR PBB SHADOW E&M-EST. PATIENT-LVL V: ICD-10-PCS | Mod: PBBFAC,,, | Performed by: NURSE PRACTITIONER

## 2023-10-31 PROCEDURE — 3079F DIAST BP 80-89 MM HG: CPT | Mod: CPTII,S$GLB,, | Performed by: NURSE PRACTITIONER

## 2023-10-31 PROCEDURE — 3074F PR MOST RECENT SYSTOLIC BLOOD PRESSURE < 130 MM HG: ICD-10-PCS | Mod: CPTII,S$GLB,, | Performed by: NURSE PRACTITIONER

## 2023-10-31 PROCEDURE — 1159F PR MEDICATION LIST DOCUMENTED IN MEDICAL RECORD: ICD-10-PCS | Mod: CPTII,S$GLB,, | Performed by: NURSE PRACTITIONER

## 2023-10-31 PROCEDURE — 3008F BODY MASS INDEX DOCD: CPT | Mod: CPTII,S$GLB,, | Performed by: NURSE PRACTITIONER

## 2023-10-31 RX ORDER — CETIRIZINE HYDROCHLORIDE 10 MG/1
10 TABLET ORAL DAILY
Qty: 90 TABLET | Refills: 3 | Status: SHIPPED | OUTPATIENT
Start: 2023-10-31

## 2023-10-31 NOTE — PATIENT INSTRUCTIONS
Hydrate well  Rest  RTC as needed  F/U with specialists as advised  Report to ER immediately if symptoms worsen or persist      Alvaro Shields,     If you are due for any health screening(s) below please notify me so we can arrange them to be ordered and scheduled. Most healthy patients at your age complete them, but you are free to accept or refuse.     If you can't do it, I'll definitely understand. If you can, I'd certainly appreciate it!    All of your core healthy metrics are met.

## 2023-10-31 NOTE — PROGRESS NOTES
Subjective     Patient ID: Alyson Vila is a 27 y.o. female.    Chief Complaint: Annual Exam  Pt in today for annual exam. Depression, anxiety managed with medication; denies SI/HI. ADD managed by PCP; states has been taking adderall x 1 m; states f/u with PCP scheduled. PCOS managed by endocrinology; has thyroid nodules; last US 2022. Healthy diet, weight loss recommended. Pt currently taking xyzal for environmental allergies; states has been taking x 2y; states not working as well and would like to change medication. Labs due; declines influenza vaccine. Pt has no other complaints today.  Past Medical History:   Diagnosis Date    PCOS (polycystic ovarian syndrome)      Social History     Socioeconomic History    Marital status: Single   Tobacco Use    Smoking status: Never   Substance and Sexual Activity    Alcohol use: Yes     Comment: Socially only. Maybe 2x per month    Drug use: Never    Sexual activity: Yes     Partners: Male     Birth control/protection: Condom, OCP     History reviewed. No pertinent surgical history.    HPI  Review of Systems   Constitutional: Negative.  Negative for activity change and unexpected weight change.   HENT: Negative.  Negative for hearing loss, rhinorrhea and trouble swallowing.    Eyes: Negative.  Negative for discharge and visual disturbance.   Respiratory: Negative.  Negative for chest tightness and wheezing.    Cardiovascular: Negative.  Negative for chest pain and palpitations.   Gastrointestinal: Negative.  Negative for blood in stool, constipation, diarrhea and vomiting.   Endocrine: Negative.  Negative for polydipsia and polyuria.   Genitourinary: Negative.  Negative for difficulty urinating, dysuria, hematuria and menstrual problem.   Musculoskeletal: Negative.  Negative for arthralgias, joint swelling and neck pain.   Integumentary:  Negative.   Allergic/Immunologic: Negative.    Neurological: Negative.  Negative for weakness and headaches.    Psychiatric/Behavioral: Negative.  Negative for confusion.           Objective     Physical Exam  Vitals and nursing note reviewed.   Constitutional:       Appearance: Normal appearance.   HENT:      Head: Normocephalic.      Right Ear: Tympanic membrane, ear canal and external ear normal.      Left Ear: Tympanic membrane, ear canal and external ear normal.      Nose: Nose normal.      Mouth/Throat:      Mouth: Mucous membranes are moist.      Pharynx: Oropharynx is clear.   Eyes:      Conjunctiva/sclera: Conjunctivae normal.      Pupils: Pupils are equal, round, and reactive to light.   Cardiovascular:      Rate and Rhythm: Normal rate and regular rhythm.      Pulses: Normal pulses.      Heart sounds: Normal heart sounds.   Pulmonary:      Effort: Pulmonary effort is normal.      Breath sounds: Normal breath sounds.   Abdominal:      General: Bowel sounds are normal.      Palpations: Abdomen is soft.   Musculoskeletal:         General: Normal range of motion.      Cervical back: Normal range of motion and neck supple.   Skin:     General: Skin is warm and dry.      Capillary Refill: Capillary refill takes 2 to 3 seconds.   Neurological:      Mental Status: She is alert and oriented to person, place, and time.   Psychiatric:         Mood and Affect: Mood normal.         Behavior: Behavior normal.         Thought Content: Thought content normal.         Judgment: Judgment normal.            Assessment and Plan     1. Annual physical exam  2. Multiple thyroid nodules  3. Mild episode of recurrent major depressive disorder  4. GAL (generalized anxiety disorder)  5. Attention deficit disorder, unspecified hyperactivity presence  6. PCOS (polycystic ovarian syndrome)  7. Environmental and seasonal allergies  8. Severe obesity (BMI 35.0-39.9) with comorbidity  9. Medication refill  -     CBC Without Differential; Future; Expected date: 10/31/2023  -     Comprehensive Metabolic Panel; Future; Expected date: 10/31/2023  -      TSH; Future; Expected date: 10/31/2023  -     Lipid Panel; Future; Expected date: 10/31/2023  -     HEMOGLOBIN A1C; Future; Expected date: 10/31/2023         -     US Soft Tissue Head Neck Thyroid; Future; Expected date: 10/31/2023         -     cetirizine (ZYRTEC) 10 MG tablet; Take 1 tablet (10 mg total) by mouth once daily.  Dispense: 90 tablet; Refill: 3  Hydrate well  Rest  RTC as needed  F/U with specialists as advised  Report to ER immediately if symptoms worsen or persist               No follow-ups on file.

## 2023-11-07 ENCOUNTER — OFFICE VISIT (OUTPATIENT)
Dept: PRIMARY CARE CLINIC | Facility: CLINIC | Age: 28
End: 2023-11-07
Payer: COMMERCIAL

## 2023-11-07 DIAGNOSIS — M25.531 PAIN IN BOTH WRISTS: ICD-10-CM

## 2023-11-07 DIAGNOSIS — M25.532 PAIN IN BOTH WRISTS: ICD-10-CM

## 2023-11-07 DIAGNOSIS — F98.8 ATTENTION DEFICIT DISORDER (ADD) WITHOUT HYPERACTIVITY: ICD-10-CM

## 2023-11-07 PROCEDURE — 99213 PR OFFICE/OUTPT VISIT, EST, LEVL III, 20-29 MIN: ICD-10-PCS | Mod: 95,,, | Performed by: INTERNAL MEDICINE

## 2023-11-07 PROCEDURE — 99213 OFFICE O/P EST LOW 20 MIN: CPT | Mod: 95,,, | Performed by: INTERNAL MEDICINE

## 2023-11-07 RX ORDER — DEXTROAMPHETAMINE SACCHARATE, AMPHETAMINE ASPARTATE MONOHYDRATE, DEXTROAMPHETAMINE SULFATE AND AMPHETAMINE SULFATE 3.75; 3.75; 3.75; 3.75 MG/1; MG/1; MG/1; MG/1
15 CAPSULE, EXTENDED RELEASE ORAL EVERY MORNING
Qty: 30 CAPSULE | Refills: 0 | Status: SHIPPED | OUTPATIENT
Start: 2023-11-07 | End: 2023-12-08 | Stop reason: SDUPTHER

## 2023-11-07 RX ORDER — MELOXICAM 15 MG/1
15 TABLET ORAL
Qty: 30 TABLET | Refills: 2 | Status: SHIPPED | OUTPATIENT
Start: 2023-11-07 | End: 2024-03-18 | Stop reason: SDUPTHER

## 2023-11-07 NOTE — PROGRESS NOTES
Primary Care Telemedicine Note  The patient location is:  Patient Home   The chief complaint leading to consultation is: adhd treatment  Total time spent with patient: 10 min    Visit type: Virtual visit with synchronous audio only and video  Each patient to whom he or she provides medical services by telemedicine is:  (1) informed of the relationship between the physician and patient and the respective role of any other health care provider with respect to management of the patient; and (2) notified that he or she may decline to receive medical services by telemedicine and may withdraw from such care at any time.      Assessment/Plan:    Problem List Items Addressed This Visit          Psychiatric    Attention deficit disorder (ADD) without hyperactivity    Overview     -therapist has conducted testing suggesting diagnosis of ADD  -now that depression/anxiety treatment established and stable, recently started on treatment for ADD  -started on adderall xr 10 mg last visit  -denies much improvement of symptoms with current dose; plan to increase to 15 mg QD with close follow up  -denies any AE with the medication thus far  -The patient was checked in the [facility] Board of Pharmacy's  Prescription Monitoring Program. There appears to be no incongruities with the patient's verbalized history.           Relevant Medications    dextroamphetamine-amphetamine (ADDERALL XR) 15 MG 24 hr capsule       Irasema Jaquez MD  _____________________________________________________________________________________________________________________________________________________    CC: adhd follow up    HPI:    Patient is an established patient who presents today via virtual visit.    We have been working on anxiety and depression symptoms and doing much better with current medications. At last visit, discussed starting treatment for ADHD now that other symptoms controlled. She had been evaluated by therapist and diagnosed with  ADD due to symptoms of difficulty concentrating and completing tasks. She had never been on treatment before in the past. Started on adderall xr 10 mg QD last visit. She denies any AE since starting, including palpitations, decreased appetite or worsening anxiety. However she has not really seen any positive changes to her ADHD treatment. She would like to increase dose.    No other new complaints today.      Past Medical History:  Past Medical History:   Diagnosis Date    PCOS (polycystic ovarian syndrome)      No past surgical history on file.  Review of patient's allergies indicates:   Allergen Reactions    Grass pollen-sweet vernal grass, std. Itching and Tinitus    Ragweed Itching and Tinitus     Social History     Tobacco Use    Smoking status: Never   Substance Use Topics    Alcohol use: Yes     Comment: Socially only. Maybe 2x per month    Drug use: Never     Family History   Problem Relation Age of Onset    Alcohol abuse Mother     Cervical cancer Mother      Current Outpatient Medications on File Prior to Visit   Medication Sig Dispense Refill    azelastine (ASTELIN) 137 mcg (0.1 %) nasal spray 1 spray (137 mcg total) by Nasal route 2 (two) times daily. for 7 days 30 mL 0    cetirizine (ZYRTEC) 10 MG tablet Take 1 tablet (10 mg total) by mouth once daily. 90 tablet 3    desvenlafaxine succinate (PRISTIQ) 100 MG Tb24 Take 1 tablet (100 mg total) by mouth once daily. 90 tablet 3    meloxicam (MOBIC) 15 MG tablet TAKE 1 TABLET(15 MG) BY MOUTH EVERY DAY 30 tablet 0    norethindrone-e.estradioL-iron (MINASTRIN 24 FE) 1 mg-20 mcg(24) /75 mg (4) Chew Take 1 tablet by mouth once daily.      semaglutide (OZEMPIC) 1 mg/dose (4 mg/3 mL) Inject 1 mg into the skin every 7 days. 3 mL 11    [DISCONTINUED] dextroamphetamine-amphetamine (ADDERALL XR) 10 MG 24 hr capsule Take 1 capsule (10 mg total) by mouth every morning. 30 capsule 0     No current facility-administered medications on file prior to visit.       Review of  Systems   Constitutional:  Negative for chills, fever and malaise/fatigue.   HENT:  Negative for hearing loss.    Eyes:  Negative for discharge.   Respiratory:  Negative for cough, shortness of breath and wheezing.    Cardiovascular:  Negative for chest pain, palpitations and leg swelling.   Gastrointestinal:  Negative for abdominal pain, blood in stool, constipation, diarrhea and vomiting.   Genitourinary:  Negative for dysuria, frequency and hematuria.   Musculoskeletal:  Negative for back pain, joint pain and neck pain.   Neurological:  Negative for weakness and headaches.   Endo/Heme/Allergies:  Negative for polydipsia.   Psychiatric/Behavioral:  Negative for depression. The patient does not have insomnia.          Physical Exam        No data to display                   No data to display                       No data to display                Physical Exam    The patient's Health Maintenance was reviewed and the following appears to be due at this time:  Health Maintenance Due   Topic Date Due    COVID-19 Vaccine (3 - 2023-24 season) 09/01/2023    Hemoglobin A1c (Prediabetes)  11/04/2023     Answers submitted by the patient for this visit:  Review of Systems Questionnaire (Submitted on 11/6/2023)  activity change: No  unexpected weight change: No  rhinorrhea: No  trouble swallowing: No  visual disturbance: No  chest tightness: No  polyuria: No  difficulty urinating: No  menstrual problem: No  joint swelling: No  arthralgias: No  confusion: No  dysphoric mood: Yes

## 2023-11-08 ENCOUNTER — TELEPHONE (OUTPATIENT)
Dept: ENDOCRINOLOGY | Facility: CLINIC | Age: 28
End: 2023-11-08
Payer: COMMERCIAL

## 2023-11-08 NOTE — TELEPHONE ENCOUNTER
----- Message from Dc Miranda sent at 11/8/2023 12:45 PM CST -----  Regarding: Pt Advice  Contact: Pt [phone]  Pt calling to check with provider to see if any labs are needed prior to scheduling any appts. Pt due to have labs on 11/10 and would like them to be added if needed. Please call [phone]

## 2023-11-10 ENCOUNTER — TELEPHONE (OUTPATIENT)
Dept: FAMILY MEDICINE | Facility: CLINIC | Age: 28
End: 2023-11-10

## 2023-11-10 ENCOUNTER — HOSPITAL ENCOUNTER (OUTPATIENT)
Dept: RADIOLOGY | Facility: HOSPITAL | Age: 28
Discharge: HOME | End: 2023-11-10
Attending: NURSE PRACTITIONER
Payer: COMMERCIAL

## 2023-11-10 ENCOUNTER — OFFICE VISIT (OUTPATIENT)
Dept: FAMILY MEDICINE | Facility: CLINIC | Age: 28
End: 2023-11-10
Payer: COMMERCIAL

## 2023-11-10 VITALS
HEIGHT: 62 IN | TEMPERATURE: 98 F | SYSTOLIC BLOOD PRESSURE: 90 MMHG | WEIGHT: 200.81 LBS | BODY MASS INDEX: 36.95 KG/M2 | OXYGEN SATURATION: 99 % | DIASTOLIC BLOOD PRESSURE: 80 MMHG

## 2023-11-10 DIAGNOSIS — E04.2 MULTIPLE THYROID NODULES: ICD-10-CM

## 2023-11-10 DIAGNOSIS — R10.11 RUQ PAIN: ICD-10-CM

## 2023-11-10 DIAGNOSIS — R10.11 RUQ PAIN: Primary | ICD-10-CM

## 2023-11-10 DIAGNOSIS — R10.11 RIGHT UPPER QUADRANT ABDOMINAL PAIN: Primary | ICD-10-CM

## 2023-11-10 PROCEDURE — 99213 PR OFFICE/OUTPT VISIT, EST, LEVL III, 20-29 MIN: ICD-10-PCS | Mod: S$GLB,,, | Performed by: NURSE PRACTITIONER

## 2023-11-10 PROCEDURE — 3079F PR MOST RECENT DIASTOLIC BLOOD PRESSURE 80-89 MM HG: ICD-10-PCS | Mod: CPTII,S$GLB,, | Performed by: NURSE PRACTITIONER

## 2023-11-10 PROCEDURE — 1160F PR REVIEW ALL MEDS BY PRESCRIBER/CLIN PHARMACIST DOCUMENTED: ICD-10-PCS | Mod: CPTII,S$GLB,, | Performed by: NURSE PRACTITIONER

## 2023-11-10 PROCEDURE — 99999 PR PBB SHADOW E&M-EST. PATIENT-LVL V: CPT | Mod: PBBFAC,,, | Performed by: NURSE PRACTITIONER

## 2023-11-10 PROCEDURE — 76705 ECHO EXAM OF ABDOMEN: CPT | Mod: 26,,, | Performed by: RADIOLOGY

## 2023-11-10 PROCEDURE — 76536 US EXAM OF HEAD AND NECK: CPT | Mod: 26,,, | Performed by: RADIOLOGY

## 2023-11-10 PROCEDURE — 3008F PR BODY MASS INDEX (BMI) DOCUMENTED: ICD-10-PCS | Mod: CPTII,S$GLB,, | Performed by: NURSE PRACTITIONER

## 2023-11-10 PROCEDURE — 76705 ECHO EXAM OF ABDOMEN: CPT | Mod: TC,PO

## 2023-11-10 PROCEDURE — 99999 PR PBB SHADOW E&M-EST. PATIENT-LVL V: ICD-10-PCS | Mod: PBBFAC,,, | Performed by: NURSE PRACTITIONER

## 2023-11-10 PROCEDURE — 76536 US SOFT TISSUE HEAD NECK THYROID: ICD-10-PCS | Mod: 26,,, | Performed by: RADIOLOGY

## 2023-11-10 PROCEDURE — 99213 OFFICE O/P EST LOW 20 MIN: CPT | Mod: S$GLB,,, | Performed by: NURSE PRACTITIONER

## 2023-11-10 PROCEDURE — 1160F RVW MEDS BY RX/DR IN RCRD: CPT | Mod: CPTII,S$GLB,, | Performed by: NURSE PRACTITIONER

## 2023-11-10 PROCEDURE — 3079F DIAST BP 80-89 MM HG: CPT | Mod: CPTII,S$GLB,, | Performed by: NURSE PRACTITIONER

## 2023-11-10 PROCEDURE — 3074F SYST BP LT 130 MM HG: CPT | Mod: CPTII,S$GLB,, | Performed by: NURSE PRACTITIONER

## 2023-11-10 PROCEDURE — 76705 US ABDOMEN LIMITED: ICD-10-PCS | Mod: 26,,, | Performed by: RADIOLOGY

## 2023-11-10 PROCEDURE — 1159F PR MEDICATION LIST DOCUMENTED IN MEDICAL RECORD: ICD-10-PCS | Mod: CPTII,S$GLB,, | Performed by: NURSE PRACTITIONER

## 2023-11-10 PROCEDURE — 3008F BODY MASS INDEX DOCD: CPT | Mod: CPTII,S$GLB,, | Performed by: NURSE PRACTITIONER

## 2023-11-10 PROCEDURE — 1159F MED LIST DOCD IN RCRD: CPT | Mod: CPTII,S$GLB,, | Performed by: NURSE PRACTITIONER

## 2023-11-10 PROCEDURE — 3074F PR MOST RECENT SYSTOLIC BLOOD PRESSURE < 130 MM HG: ICD-10-PCS | Mod: CPTII,S$GLB,, | Performed by: NURSE PRACTITIONER

## 2023-11-10 PROCEDURE — 76536 US EXAM OF HEAD AND NECK: CPT | Mod: TC,PO

## 2023-11-10 RX ORDER — ONDANSETRON HYDROCHLORIDE 8 MG/1
8 TABLET, FILM COATED ORAL EVERY 8 HOURS PRN
Qty: 15 TABLET | Refills: 0 | Status: SHIPPED | OUTPATIENT
Start: 2023-11-10 | End: 2024-01-02

## 2023-11-10 RX ORDER — DICYCLOMINE HYDROCHLORIDE 20 MG/1
20 TABLET ORAL 2 TIMES DAILY PRN
Qty: 14 TABLET | Refills: 0 | Status: SHIPPED | OUTPATIENT
Start: 2023-11-10 | End: 2023-11-17

## 2023-11-10 RX ORDER — CYCLOSPORINE 0.5 MG/ML
1 EMULSION OPHTHALMIC 2 TIMES DAILY
COMMUNITY
Start: 2023-08-23

## 2023-11-10 NOTE — PATIENT INSTRUCTIONS
"Liquid diet x 12-24 h; advance to soft bland as tolerated  Hydrate well  Report to ER immediately if symptoms worsen or persist    Dicyclomine (dicycloverine): Patient drug information  Access [employer] for additional drug information, tools, and databases.  Copyright 7496-6238 [employer] All rights reserved.  Contributor Disclosures  (For additional information see "Dicyclomine (dicycloverine): Drug information" and see "Dicyclomine (dicycloverine): Pediatric drug information")    You must carefully read the "Consumer Information Use and Disclaimer" below in order to understand and correctly use this information.  Brand Names: [place]  Bentyl    Brand Names: Rodolfo  JAMP-Dicyclomine HCl;     Protylol [DSC];     ISMAEL-Dicyclomine    What is this drug used for?  It is used to treat GI (gastrointestinal) spasms.  It is used to treat irritable bowel syndrome.    What do I need to tell my doctor BEFORE I take this drug?  For all patients taking this drug:  If you are allergic to this drug; any part of this drug; or any other drugs, foods, or substances. Tell your doctor about the allergy and what signs you had.  If you have any of these health problems: Bowel block, heart problems due to bleeding, glaucoma, myasthenia gravis, reflux esophagitis, slow moving GI (gastrointestinal tract), trouble passing urine, or very bad ulcerative colitis.  If you are breast-feeding. Do not breast-feed while you take this drug.  Children:  If your child is younger than 6 months of age. Do not give this drug to an infant younger than 6 months of age.  This is not a list of all drugs or health problems that interact with this drug.  Tell your doctor and pharmacist about all of your drugs (prescription or OTC, natural products, vitamins) and health problems. You must check to make sure that it is safe for you to take this drug with all of your drugs and health problems. Do not start, stop, or change the dose of any drug without " checking with your doctor.    What are some things I need to know or do while I take this drug?  Tell all of your health care providers that you take this drug. This includes your doctors, nurses, pharmacists, and dentists.  Avoid driving and doing other tasks or actions that call for you to be alert or have clear eyesight until you see how this drug affects you.  Talk with your doctor before you use alcohol, marijuana or other forms of cannabis, or prescription or OTC drugs that may slow your actions.  Bright lights may bother you. Wear sunglasses.  Be careful in hot weather or while being active. Drink lots of fluids to stop fluid loss.  If you are 65 or older, use this drug with care. You could have more side effects.  Tell your doctor if you are pregnant or plan on getting pregnant. You will need to talk about the benefits and risks of using this drug while you are pregnant.    What are some side effects that I need to call my doctor about right away?  WARNING/CAUTION: Even though it may be rare, some people may have very bad and sometimes deadly side effects when taking a drug. Tell your doctor or get medical help right away if you have any of the following signs or symptoms that may be related to a very bad side effect:  Signs of an allergic reaction, like rash; hives; itching; red, swollen, blistered, or peeling skin with or without fever; wheezing; tightness in the chest or throat; trouble breathing, swallowing, or talking; unusual hoarseness; or swelling of the mouth, face, lips, tongue, or throat.  Fever.  Not sweating during activities or in warm temperatures.  Very bad dizziness or passing out.  Feeling confused.  Trouble swallowing or speaking.  Change in balance.  Change in eyesight.  Larger pupils.  If bright lights bother your eyes.  Trouble passing urine.  Diarrhea.  Fast, slow, or abnormal heartbeat.  Hallucinations (seeing or hearing things that are not there).  Memory problems or loss.  Mood  changes.    What are some other side effects of this drug?  All drugs may cause side effects. However, many people have no side effects or only have minor side effects. Call your doctor or get medical help if any of these side effects or any other side effects bother you or do not go away:  Feeling dizzy, sleepy, tired, or weak.  Blurred eyesight.  Upset stomach.  Feeling nervous and excitable.  Dry mouth.  Unusual thirst.  Dry skin.  Flushing.  Trouble sleeping.  Constipation.  These are not all of the side effects that may occur. If you have questions about side effects, call your doctor. Call your doctor for medical advice about side effects.  You may report side effects to your national health agency.    How is this drug best taken?  Use this drug as ordered by your doctor. Read all information given to you. Follow all instructions closely.  All oral products:  Take with or without food.  Do not take antacids at the same time as this drug. Talk with your doctor.  Oral liquid:  Measure liquid doses carefully. Use the measuring device that comes with this drug. If there is none, ask the pharmacist for a device to measure this drug.  Injection:  It is given as a shot into a muscle.    What do I do if I miss a dose?  All oral products:  Take a missed dose as soon as you think about it.  If it is close to the time for your next dose, skip the missed dose and go back to your normal time.  Do not take 2 doses at the same time or extra doses.  Injection:  Call your doctor to find out what to do.    How do I store and/or throw out this drug?  All oral products:  Store at room temperature in a dry place. Do not store in a bathroom.  Protect from heat.  Tablets and capsules:  Keep out of direct sunlight.  Injection:  If you need to store this drug at home, talk with your doctor, nurse, or pharmacist about how to store it.  All products:  Keep all drugs in a safe place. Keep all drugs out of the reach of children and  pets.  Throw away unused or  drugs. Do not flush down a toilet or pour down a drain unless you are told to do so. Check with your pharmacist if you have questions about the best way to throw out drugs. There may be drug take-back programs in your area.    General drug facts  If your symptoms or health problems do not get better or if they become worse, call your doctor.  Do not share your drugs with others and do not take anyone else's drugs.  Some drugs may have another patient information leaflet. If you have any questions about this drug, please talk with your doctor, nurse, pharmacist, or other health care provider.  If you think there has been an overdose, call your poison control center or get medical care right away. Be ready to tell or show what was taken, how much, and when it happened.      Alvaro Shields,     If you are due for any health screening(s) below please notify me so we can arrange them to be ordered and scheduled. Most healthy patients at your age complete them, but you are free to accept or refuse.     If you can't do it, I'll definitely understand. If you can, I'd certainly appreciate it!    All of your core healthy metrics are met.

## 2023-11-10 NOTE — PROGRESS NOTES
Subjective     Patient ID: Alyson Vila is a 27 y.o. female.    Chief Complaint: Rib Injury (Right side pain)    Abdominal Pain  This is a new problem. The current episode started in the past 7 days. The onset quality is sudden. The problem occurs constantly. The most recent episode lasted 4 days. The problem has been waxing and waning. The pain is located in the RUQ. The pain is at a severity of 4/10. The pain is mild. The quality of the pain is dull and sharp. Associated symptoms include anorexia and nausea. Pertinent negatives include no arthralgias, belching, constipation, diarrhea, dysuria, fever, flatus, frequency, headaches, hematochezia, hematuria, melena, myalgias, vomiting or weight loss. The pain is aggravated by deep breathing and movement. The pain is relieved by Being still and recumbency. She has tried antacids for the symptoms. The treatment provided no relief. Prior diagnostic workup includes ultrasound. Her past medical history is significant for GERD. There is no history of abdominal surgery, colon cancer, Crohn's disease, gallstones, irritable bowel syndrome, pancreatitis, PUD or ulcerative colitis. Patient's medical history does not include kidney stones and UTI.     Past Medical History:   Diagnosis Date    PCOS (polycystic ovarian syndrome)      Social History     Socioeconomic History    Marital status: Single   Tobacco Use    Smoking status: Never   Substance and Sexual Activity    Alcohol use: Yes     Comment: Socially only. Maybe 2x per month    Drug use: Never    Sexual activity: Yes     Partners: Male     Birth control/protection: Condom, OCP     Social Determinants of Health     Financial Resource Strain: Medium Risk (11/10/2023)    Overall Financial Resource Strain (CARDIA)     Difficulty of Paying Living Expenses: Somewhat hard   Food Insecurity: No Food Insecurity (11/10/2023)    Hunger Vital Sign     Worried About Running Out of Food in the Last Year: Never true     Ran Out of  Food in the Last Year: Never true   Transportation Needs: No Transportation Needs (11/10/2023)    PRAPARE - Transportation     Lack of Transportation (Medical): No     Lack of Transportation (Non-Medical): No   Physical Activity: Insufficiently Active (11/10/2023)    Exercise Vital Sign     Days of Exercise per Week: 2 days     Minutes of Exercise per Session: 10 min   Stress: No Stress Concern Present (11/10/2023)    [employer] [place] [employer] - Occupational Stress Questionnaire     Feeling of Stress : Only a little   Social Connections: Unknown (11/10/2023)    Social Connection and Isolation Panel [NHANES]     Frequency of Communication with Friends and Family: Three times a week     Frequency of Social Gatherings with Friends and Family: Twice a week     Active Member of Clubs or Organizations: No     Attends Club or Organization Meetings: Never     Marital Status: Never    Housing Stability: Low Risk  (11/10/2023)    Housing Stability Vital Sign     Unable to Pay for Housing in the Last Year: No     Number of Places Lived in the Last Year: 1     Unstable Housing in the Last Year: No     No past surgical history on file.    Review of Systems   Constitutional: Negative.  Negative for fever and weight loss.   HENT: Negative.     Eyes: Negative.    Respiratory: Negative.     Cardiovascular: Negative.    Gastrointestinal:  Positive for abdominal pain, anorexia and nausea. Negative for constipation, diarrhea, flatus, hematochezia, melena and vomiting.   Endocrine: Negative.    Genitourinary: Negative.  Negative for dysuria, frequency and hematuria.   Musculoskeletal: Negative.  Negative for arthralgias and myalgias.   Integumentary:  Negative.   Allergic/Immunologic: Negative.    Neurological: Negative.  Negative for headaches.   Psychiatric/Behavioral: Negative.            Objective     Physical Exam  Vitals and nursing note reviewed.   Constitutional:       Appearance: Normal appearance.   HENT:       Head: Normocephalic.      Right Ear: Tympanic membrane, ear canal and external ear normal.      Left Ear: Tympanic membrane, ear canal and external ear normal.      Nose: Nose normal.      Mouth/Throat:      Mouth: Mucous membranes are moist.      Pharynx: Oropharynx is clear.   Eyes:      Conjunctiva/sclera: Conjunctivae normal.      Pupils: Pupils are equal, round, and reactive to light.   Cardiovascular:      Rate and Rhythm: Normal rate and regular rhythm.      Pulses: Normal pulses.      Heart sounds: Normal heart sounds.   Pulmonary:      Effort: Pulmonary effort is normal.      Breath sounds: Normal breath sounds.   Abdominal:      General: Bowel sounds are normal.      Palpations: Abdomen is soft.      Tenderness: There is abdominal tenderness in the right upper quadrant. There is no right CVA tenderness, left CVA tenderness, guarding or rebound. Negative signs include Thapa's sign, Rovsing's sign, McBurney's sign, psoas sign and obturator sign.   Musculoskeletal:         General: Normal range of motion.      Cervical back: Normal range of motion and neck supple.   Skin:     General: Skin is warm and dry.      Capillary Refill: Capillary refill takes 2 to 3 seconds.   Neurological:      Mental Status: She is alert and oriented to person, place, and time.   Psychiatric:         Mood and Affect: Mood normal.         Behavior: Behavior normal.         Thought Content: Thought content normal.         Judgment: Judgment normal.            Assessment and Plan     1. RUQ pain  -     US Abdomen Limited; Future; Expected date: 11/10/2023  -     Hepatic Function Panel; Future; Expected date: 11/10/2023  -     Amylase; Future; Expected date: 11/10/2023  -     Lipase; Future; Expected date: 11/10/2023  -     CBC Without Differential; Future; Expected date: 11/10/2023        -     ondansetron (ZOFRAN) 8 MG tablet; Take 1 tablet (8 mg total) by mouth every 8 (eight) hours as needed.        -     dicyclomine (BENTYL)  20 mg tablet; Take 1 tablet (20 mg total) by mouth 2 (two) times daily as needed.  Dispense: 14 tablet; Refill: 0  Liquid diet x 12-24 h; advance to soft bland as tolerated  Hydrate well  Report to ER immediately if symptoms worsen or persist                   No follow-ups on file.

## 2023-11-13 ENCOUNTER — TELEPHONE (OUTPATIENT)
Dept: FAMILY MEDICINE | Facility: CLINIC | Age: 28
End: 2023-11-13
Payer: COMMERCIAL

## 2023-11-13 ENCOUNTER — PATIENT MESSAGE (OUTPATIENT)
Dept: FAMILY MEDICINE | Facility: CLINIC | Age: 28
End: 2023-11-13
Payer: COMMERCIAL

## 2023-11-13 DIAGNOSIS — R10.9 ABDOMINAL PAIN, UNSPECIFIED ABDOMINAL LOCATION: Primary | ICD-10-CM

## 2023-11-13 NOTE — TELEPHONE ENCOUNTER
----- Message from Holland Grajeda sent at 11/13/2023  3:06 PM CST -----  Type:  Patient Returning Call    Who Called: Self     Who Left Message for Patient: Unknown    Does the patient know what this is regarding?:YES     Would the patient rather a call back or a response via [facility]? Call     Best Call Back Number: [phone] (WORK) < pt SAYS PLEASE CALL THIS NUMBER BECAUSE SHE IS AT WORK AND DOESN'T HAVE SERVICE ON HER CELL

## 2023-11-15 ENCOUNTER — OFFICE VISIT (OUTPATIENT)
Dept: GASTROENTEROLOGY | Facility: CLINIC | Age: 28
End: 2023-11-15
Payer: COMMERCIAL

## 2023-11-15 VITALS — HEIGHT: 62 IN | WEIGHT: 198.88 LBS | BODY MASS INDEX: 36.6 KG/M2

## 2023-11-15 DIAGNOSIS — R11.0 NAUSEA: ICD-10-CM

## 2023-11-15 DIAGNOSIS — K92.1 HEMATOCHEZIA: ICD-10-CM

## 2023-11-15 DIAGNOSIS — R19.8 IRREGULAR BOWEL HABITS: ICD-10-CM

## 2023-11-15 DIAGNOSIS — R13.14 PHARYNGOESOPHAGEAL DYSPHAGIA: ICD-10-CM

## 2023-11-15 DIAGNOSIS — R10.11 RIGHT UPPER QUADRANT ABDOMINAL PAIN: Primary | ICD-10-CM

## 2023-11-15 DIAGNOSIS — R14.2 ERUCTATION: ICD-10-CM

## 2023-11-15 DIAGNOSIS — R14.0 BLOATING SYMPTOM: ICD-10-CM

## 2023-11-15 DIAGNOSIS — Z87.19 HISTORY OF GASTROESOPHAGEAL REFLUX (GERD): ICD-10-CM

## 2023-11-15 PROCEDURE — 3008F BODY MASS INDEX DOCD: CPT | Mod: CPTII,S$GLB,, | Performed by: NURSE PRACTITIONER

## 2023-11-15 PROCEDURE — 99204 PR OFFICE/OUTPT VISIT, NEW, LEVL IV, 45-59 MIN: ICD-10-PCS | Mod: S$GLB,,, | Performed by: NURSE PRACTITIONER

## 2023-11-15 PROCEDURE — 1159F PR MEDICATION LIST DOCUMENTED IN MEDICAL RECORD: ICD-10-PCS | Mod: CPTII,S$GLB,, | Performed by: NURSE PRACTITIONER

## 2023-11-15 PROCEDURE — 1160F RVW MEDS BY RX/DR IN RCRD: CPT | Mod: CPTII,S$GLB,, | Performed by: NURSE PRACTITIONER

## 2023-11-15 PROCEDURE — 99999 PR PBB SHADOW E&M-EST. PATIENT-LVL IV: ICD-10-PCS | Mod: PBBFAC,,, | Performed by: NURSE PRACTITIONER

## 2023-11-15 PROCEDURE — 1160F PR REVIEW ALL MEDS BY PRESCRIBER/CLIN PHARMACIST DOCUMENTED: ICD-10-PCS | Mod: CPTII,S$GLB,, | Performed by: NURSE PRACTITIONER

## 2023-11-15 PROCEDURE — 1159F MED LIST DOCD IN RCRD: CPT | Mod: CPTII,S$GLB,, | Performed by: NURSE PRACTITIONER

## 2023-11-15 PROCEDURE — 3044F PR MOST RECENT HEMOGLOBIN A1C LEVEL <7.0%: ICD-10-PCS | Mod: CPTII,S$GLB,, | Performed by: NURSE PRACTITIONER

## 2023-11-15 PROCEDURE — 3044F HG A1C LEVEL LT 7.0%: CPT | Mod: CPTII,S$GLB,, | Performed by: NURSE PRACTITIONER

## 2023-11-15 PROCEDURE — 3008F PR BODY MASS INDEX (BMI) DOCUMENTED: ICD-10-PCS | Mod: CPTII,S$GLB,, | Performed by: NURSE PRACTITIONER

## 2023-11-15 PROCEDURE — 99204 OFFICE O/P NEW MOD 45 MIN: CPT | Mod: S$GLB,,, | Performed by: NURSE PRACTITIONER

## 2023-11-15 PROCEDURE — 99999 PR PBB SHADOW E&M-EST. PATIENT-LVL IV: CPT | Mod: PBBFAC,,, | Performed by: NURSE PRACTITIONER

## 2023-11-15 RX ORDER — PANTOPRAZOLE SODIUM 40 MG/1
40 TABLET, DELAYED RELEASE ORAL
Qty: 30 TABLET | Refills: 1 | Status: SHIPPED | OUTPATIENT
Start: 2023-11-15 | End: 2024-02-23

## 2023-11-15 NOTE — PATIENT INSTRUCTIONS
Abdominal Pain    Abdominal pain is pain in the stomach or belly area. Everyone has this pain from time to time. In many cases it goes away on its own. But abdominal pain can sometimes be due to a serious problem, such as appendicitis. So its important to know when to seek help.  Causes of abdominal pain  There are many possible causes of abdominal pain. Common causes in adults include:  Constipation, diarrhea, or gas  Stomach acid flowing back up into the esophagus (acid reflux or heartburn)  Severe acid reflux, called GERD (gastroesophageal reflux disease)  A sore in the lining of the stomach or small intestine (peptic ulcer)  Inflammation of the gallbladder, liver, or pancreas  Gallstones or kidney stones  Appendicitis   Intestinal blockage   An internal organ pushing through a muscle or other tissue (hernia)  Urinary tract infections  In women, menstrual cramps, fibroids, or endometriosis  Inflammation or infection of the intestines  Diagnosing the cause of abdominal pain  Your healthcare provider will do a physical exam help find the cause of your pain. If needed, tests will be ordered. Belly pain has many possible causes. So it can be hard to find the reason for your pain. Giving details about your pain can help. Tell your provider where and when you feel the pain, and what makes it better or worse. Also let your provider know if you have other symptoms such as:  Fever  Tiredness  Upset stomach (nausea)  Vomiting  Changes in bathroom habits  Treating abdominal pain  Some causes of pain need emergency medical treatment right away. These include appendicitis or a bowel blockage. Other problems can be treated with rest, fluids, or medicines. Your healthcare provider can give you specific instructions for treatment or self-care based on what is causing your pain.  If you have vomiting or diarrhea, sip water or other clear fluids. When you are ready to eat solid foods again, start with small amounts of  easy-to-digest, low-fat foods. These include apple sauce, toast, or crackers.   When to seek medical care  Call 911 or go to the hospital right away if you:  Cant pass stool and are vomiting  Are vomiting blood or have bloody diarrhea or black, tarry diarrhea  Have chest, neck, or shoulder pain  Feel like you might pass out  Have pain in your shoulder blades with nausea  Have sudden, severe belly pain  Have new, severe pain unlike any you have felt before  Have a belly that is rigid, hard, and tender to touch  Call your healthcare provider if you have:  Pain for more than 5 days  Bloating for more than 2 days  Diarrhea for more than 5 days  A fever of 100.4°F (38.0°C) or higher, or as directed by your provider  Pain that gets worse  Weight loss for no reason  Continued lack of appetite  Blood in your stool  How to prevent abdominal pain  Here are some tips to help prevent abdominal pain:  Eat smaller amounts of food at one time.  Avoid greasy, fried, or other high-fat foods.  Avoid foods that give you gas.  Exercise regularly.  Drink plenty of fluids.  To help prevent GERD symptoms:  Quit smoking.  Reduce alcohol and certain foods that increase stomach acid.  Avoid aspirin and over-the-counter pain and fever medicines (NSAIDS or nonsteroidal anti-inflammatory drugs), if possible  Lose extra weight.  Finish eating at least 2 hours before you go to bed or lie down.  Raise the head of your bed.  Date Last Reviewed: 7/1/2016  © 3179-0964 [employer]. [address] [place] [address], [place] [address]. All rights reserved. This information is not intended as a substitute for professional medical care. Always follow your healthcare professional's instructions.     Excess Gas  Certain foods produce gas when digested. In some people, these foods make an excessive amount of gas. This may cause bloating, burping, or increased gas passing through the rectum (flatulence).  - Recommend taking Over-The-Counter simethicone  as directed on packaging, such as Phazyme or Gas-x.    Foods that cause gas  The following foods are more likely to cause this problem. Limit them, or remove them from your diet:  Broccoli  Cauliflower  [place] sprouts  Cabbage  Cooked dried beans  Fizzy (carbonated) drinks, such as sparkling water, soda, beer, and champagne  Other causes  Other causes of excess gas include:  Eating too fast or talking while you chew. This may cause you to swallow air. This increases the amount of gas in your stomach. And it may make your symptoms worse. Chew each mouthful completely before you swallow. Take your time.  Chewing on gum or sucking on hard candy. These cause you to swallow more often. And some of what you are swallowing is air. This leads to more gas in your stomach. Avoid chewing gum and hard candy.  Overeating. This may increase the feeling of being bloated and cause more gas. When you are full, stop eating.   Being constipated. This can increase the amount of normal intestinal gas. Avoid constipation by getting more fiber in your diet. Good sources of fiber include whole-grain cereal, fresh vegetables (except those in the above list), and fresh fruits. High-fiber foods absorb water and carry it out of the body. When adding more fiber to your diet, you also need to drink more water. You should drink at least 8, 8-ounce glasses of water (2 quarts) per day.  Date Last Reviewed: 8/1/2016  © 7028-2106 [employer]. [address] [place] [address], [place] [address]. All rights reserved. This information is not intended as a substitute for professional medical care. Always follow your healthcare professional's instructions.

## 2023-11-15 NOTE — PROGRESS NOTES
Subjective:       Patient ID: Alyson Vila is a 27 y.o. female Body mass index is 36.37 kg/m².    Chief Complaint: Abdominal Pain    This patient is new to me.  Referring Provider: Trinidad Snowden NP for abdominal pain.    Abdominal Pain  This is a new problem. Episode onset: started 11/7/2023. The most recent episode lasted 3 days (of dull pain). The problem has been gradually improving. The pain is located in the RUQ. The pain is at a severity of 2/10 (currently). The quality of the pain is a sensation of fullness (was mostly dull with occasional sharp pains to RUQ; now just feeling bloating to upper abdomen). Associated symptoms include belching (frequent), constipation (had some constipation on 11/1/2023; bowel movements are daily to once every few days of firm stools except yesterday had loose stools (had not had BM since 11/9/2023 since been on liquid diet as advised by PCP)), diarrhea, a fever (tmax on 11/11/2023 was 101.2F, yesterday tmax 99F), flatus, nausea (frequent; zofran PRN) and weight loss (taking ozempic since 3/2023, was out of it for awhile and restarted it ~3 weeks ago at the same dose (takes on sundays)). Pertinent negatives include no dysuria, frequency, hematochezia, melena or vomiting. The pain is aggravated by deep breathing and eating (sitting makes bloating worse). Relieved by: laying. Treatments tried: liquid/bland diet currently; bentyl 20 mg BID PRN- taking BID. The treatment provided mild relief. Prior diagnostic workup includes ultrasound (scheduled for CT scan to be done soon). Her past medical history is significant for GERD (PAST TREATMENT: omeprazole- helped in the past but not recently when taken once). There is no history of Crohn's disease, gallstones, pancreatitis, PUD or ulcerative colitis.     Review of Systems   Constitutional:  Positive for appetite change (decreased), fever (tmax on 11/11/2023 was 101.2F, yesterday tmax 99F) and weight loss (taking ozempic  since 3/2023, was out of it for awhile and restarted it ~3 weeks ago at the same dose (takes on sundays)). Negative for chills and fatigue.        Taking Mobic daily   HENT:  Positive for sore throat (had on 11/11/2023) and trouble swallowing (occasional with liquids (mostly her spit), has to do an extra swallow; patient relates to enlarged thyroid & hashimoto's).    Respiratory:  Negative for cough, choking and shortness of breath.    Cardiovascular:  Negative for chest pain.   Gastrointestinal:  Positive for abdominal pain, anal bleeding (had once on 11/1/2023 with constipation, scant amount of bright red blood on tissue; denies bleeding in between bowel movements), constipation (had some constipation on 11/1/2023; bowel movements are daily to once every few days of firm stools except yesterday had loose stools (had not had BM since 11/9/2023 since been on liquid diet as advised by PCP)), diarrhea, flatus and nausea (frequent; zofran PRN). Negative for blood in stool, hematochezia, melena, rectal pain and vomiting.   Genitourinary:  Negative for difficulty urinating, dysuria, flank pain and frequency.   Neurological:  Negative for weakness.       Patient's last menstrual period was 10/27/2023 (approximate).  Past Medical History:   Diagnosis Date    PCOS (polycystic ovarian syndrome)      History reviewed. No pertinent surgical history.  Family History   Problem Relation Age of Onset    Alcohol abuse Mother     Cervical cancer Mother     Colon cancer Neg Hx     Crohn's disease Neg Hx     Esophageal cancer Neg Hx     Ulcerative colitis Neg Hx     Stomach cancer Neg Hx      Social History     Tobacco Use    Smoking status: Never   Substance Use Topics    Alcohol use: Yes     Comment: Socially only. Maybe 2x per month    Drug use: Never     Wt Readings from Last 10 Encounters:   11/15/23 90.2 kg (198 lb 13.7 oz)   11/10/23 91.1 kg (200 lb 12.8 oz)   10/31/23 90.7 kg (200 lb)   10/11/23 92.9 kg (204 lb 11.2 oz)    10/05/23 98 kg (216 lb)   09/26/23 98 kg (216 lb)   06/09/23 98.1 kg (216 lb 4.8 oz)   01/13/23 97.5 kg (214 lb 15.2 oz)   11/10/22 96.3 kg (212 lb 4.9 oz)   11/04/22 97.6 kg (215 lb 0.9 oz)     Lab Results   Component Value Date    WBC 5.73 11/10/2023    WBC 5.73 11/10/2023    HGB 12.7 11/10/2023    HGB 12.7 11/10/2023    HCT 38.1 11/10/2023    HCT 38.1 11/10/2023    MCV 93 11/10/2023    MCV 93 11/10/2023     11/10/2023     11/10/2023     CMP  Sodium   Date Value Ref Range Status   11/10/2023 137 136 - 145 mmol/L Final     Potassium   Date Value Ref Range Status   11/10/2023 4.6 3.5 - 5.1 mmol/L Final     Chloride   Date Value Ref Range Status   11/10/2023 104 95 - 110 mmol/L Final     CO2   Date Value Ref Range Status   11/10/2023 24 23 - 29 mmol/L Final     Glucose   Date Value Ref Range Status   11/10/2023 82 70 - 110 mg/dL Final     BUN   Date Value Ref Range Status   11/10/2023 10 6 - 20 mg/dL Final     Creatinine   Date Value Ref Range Status   11/10/2023 0.8 0.5 - 1.4 mg/dL Final     Calcium   Date Value Ref Range Status   11/10/2023 9.7 8.7 - 10.5 mg/dL Final     Total Protein   Date Value Ref Range Status   11/10/2023 7.5 6.0 - 8.4 g/dL Final   11/10/2023 7.5 6.0 - 8.4 g/dL Final     Albumin   Date Value Ref Range Status   11/10/2023 3.6 3.5 - 5.2 g/dL Final   11/10/2023 3.6 3.5 - 5.2 g/dL Final     Total Bilirubin   Date Value Ref Range Status   11/10/2023 0.3 0.1 - 1.0 mg/dL Final     Comment:     For infants and newborns, interpretation of results should be based  on gestational age, weight and in agreement with clinical  observations.    Premature Infant recommended reference ranges:  Up to 24 hours.............<8.0 mg/dL  Up to 48 hours............<12.0 mg/dL  3-5 days..................<15.0 mg/dL  6-29 days.................<15.0 mg/dL     11/10/2023 0.3 0.1 - 1.0 mg/dL Final     Comment:     For infants and newborns, interpretation of results should be based  on gestational age,  weight and in agreement with clinical  observations.    Premature Infant recommended reference ranges:  Up to 24 hours.............<8.0 mg/dL  Up to 48 hours............<12.0 mg/dL  3-5 days..................<15.0 mg/dL  6-29 days.................<15.0 mg/dL       Alkaline Phosphatase   Date Value Ref Range Status   11/10/2023 60 55 - 135 U/L Final   11/10/2023 60 55 - 135 U/L Final     AST   Date Value Ref Range Status   11/10/2023 23 10 - 40 U/L Final   11/10/2023 23 10 - 40 U/L Final     ALT   Date Value Ref Range Status   11/10/2023 23 10 - 44 U/L Final   11/10/2023 23 10 - 44 U/L Final     Anion Gap   Date Value Ref Range Status   11/10/2023 9 8 - 16 mmol/L Final     Lab Results   Component Value Date    AMYLASE 38 11/10/2023     Lab Results   Component Value Date    LIPASE 21 11/10/2023     Lab Results   Component Value Date    TSH 2.020 11/10/2023     Reviewed prior medical records including radiology report of 11/10/2023 limited abdominal ultrasound.    Objective:      Physical Exam  Vitals and nursing note reviewed.   Constitutional:       General: She is not in acute distress.     Appearance: Normal appearance. She is well-developed. She is not diaphoretic.   HENT:      Mouth/Throat:      Lips: Pink. No lesions.      Mouth: Mucous membranes are moist. No oral lesions.      Tongue: No lesions.      Pharynx: Oropharynx is clear. No pharyngeal swelling or posterior oropharyngeal erythema.   Eyes:      General: No scleral icterus.     Conjunctiva/sclera: Conjunctivae normal.   Pulmonary:      Effort: Pulmonary effort is normal. No respiratory distress.      Breath sounds: Normal breath sounds. No wheezing.   Abdominal:      General: Bowel sounds are normal. There is no distension or abdominal bruit.      Palpations: Abdomen is soft. Abdomen is not rigid. There is no mass.      Tenderness: There is abdominal tenderness (mild) in the right upper quadrant. There is no guarding or rebound. Negative signs include  Thapa's sign and McBurney's sign.      Comments: Deferred rectal examination.   Skin:     General: Skin is warm and dry.      Coloration: Skin is not jaundiced or pale.      Findings: No erythema or rash.   Neurological:      Mental Status: She is alert and oriented to person, place, and time.   Psychiatric:         Behavior: Behavior normal.         Thought Content: Thought content normal.         Judgment: Judgment normal.         Assessment:       1. Right upper quadrant abdominal pain    2. History of gastroesophageal reflux (GERD)    3. Eructation    4. Pharyngoesophageal dysphagia    5. Bloating symptom    6. Irregular bowel habits    7. Hematochezia    8. Nausea        Plan:       Right upper quadrant abdominal pain  -  START   pantoprazole (PROTONIX) 40 MG tablet; Take 1 tablet (40 mg total) by mouth before breakfast.  Dispense: 30 tablet; Refill: 1  - schedule EGD, discussed procedure with patient, including risks and benefits, patient verbalized understanding  - avoid/minimize use of NSAIDs- since they can cause GI upset, bleeding and/or ulcers. If NSAID must be taken, recommend take with food.    History of gastroesophageal reflux (GERD) & Eructation  - START    pantoprazole (PROTONIX) 40 MG tablet; Take 1 tablet (40 mg total) by mouth before breakfast.  Dispense: 30 tablet; Refill: 1  - schedule EGD, discussed procedure with patient, including risks and benefits, patient verbalized understanding    Pharyngoesophageal dysphagia  - schedule EGD, discussed procedure with patient and possible esophageal dilation may be performed during procedure if indicated, patient verbalized understanding  - educated patient to eat smaller more frequent meals and to eat slowly and advised to eat a soft diet.  - possible UGI/esophagram/esophageal manometry if symptoms persist    Bloating symptom  - recommend OTC simethicone as directed, such as Phazyme or Gas-x  - recommend low gas diet: Reduce or eliminate these foods  from your diet: Broccoli, Cauliflower, [place] sprouts, Cabbage, Cooked dried beans, Carbonated beverages (sparkling water, soda, beer, champagne)  Other Causes Of Excess Gas Include:   1) EATING TOO FAST or TALKING WHILE YOU CHEW may cause you to swallow air. This increases the amount of gas in the stomach and may worsen your symptoms.  --> Chew each mouthful completely before swallowing. Take your time.  2) OVEREATING may increase the feeling of being bloated and cause more gas.  --> When you are full, stop eating.  3) CONSTIPATION can increase the amount of normal intestinal gas.  --> Avoid constipation by increasing the amount of fiber in your diet by including whole cereal grains, fresh vegetables (except those in the above list) and fresh fruits. High-fiber foods absorb water and carry it out of the body. When increasing the amount of fiber in your diet, you also need to increase the amount of water that you drink. You should drink at least eight 8-ounce glasses of water (two quarts) per day.  - schedule EGD, discussed procedure with patient, including risks and benefits, patient verbalized understanding  - schedule Colonoscopy, discussed procedure with the patient, including risks and benefits, patient verbalized understanding    Irregular bowel habits  - schedule Colonoscopy, discussed procedure with the patient, including risks and benefits, patient verbalized understanding  - recommended increase fiber in diet, especially soluble fiber since this can help bulk up the stool consistency and may help to slow down how fast the stool goes through the colon and can prevent diarrhea; Recommend high fiber diet (20-30 grams of fiber daily)/OTC fiber supplements daily as directed, such as Benefiber or Metamucil.  - CAN CONTINUE BENTYL 20 MG BID PRN AS DIRECTED    Hematochezia  - schedule Colonoscopy, discussed procedure with the patient, including risks and benefits, patient verbalized understanding  - discussed  about different etiologies that can cause rectal bleeding, such as but not limited to diverticulosis, polyps, colon mass, colon inflammation or infection, anal fissure or hemorrhoids.   - You may resume normal activity as long as you feel well.  - Avoid/minimize NSAIDs such as ibuprofen (Advil, Motrin) and naproxen (Aleve and Naprosyn).  - Avoid alcohol.    Nausea  -  START   pantoprazole (PROTONIX) 40 MG tablet; Take 1 tablet (40 mg total) by mouth before breakfast.  Dispense: 30 tablet; Refill: 1  - schedule EGD, discussed procedure with patient, including risks and benefits, patient verbalized understanding  - CONTINUE ZOFRAN PRN AS DIRECTED FOR NAUSEA  - discussed with patient that certain medications, such as Ozempic, may be contributing to symptoms. I recommend follow-up with provider who manages medication to discuss about possible alternative therapy, patient verbalized understanding    Follow up in about 1 month (around 12/15/2023), or if symptoms worsen or fail to improve.      If no improvement in symptoms or symptoms worsen, call/follow-up at clinic or go to ER.        36 minutes of total time spent on the encounter, which includes face to face time and non-face to face time preparing to see the patient (e.g., review of tests), Obtaining and/or reviewing separately obtained history, Documenting clinical information in the electronic or other health record, Independently interpreting results (not separately reported) and communicating results to the patient/family/caregiver, or Care coordination (not separately reported).

## 2023-11-24 ENCOUNTER — PATIENT MESSAGE (OUTPATIENT)
Dept: GASTROENTEROLOGY | Facility: CLINIC | Age: 28
End: 2023-11-24
Payer: COMMERCIAL

## 2023-11-27 ENCOUNTER — TELEPHONE (OUTPATIENT)
Dept: GASTROENTEROLOGY | Facility: CLINIC | Age: 28
End: 2023-11-27
Payer: COMMERCIAL

## 2023-11-27 NOTE — TELEPHONE ENCOUNTER
Reviewed CT and ER visit note in care everywhere. Please move colonoscopy to sooner date (EGD can stay on current date and complete colonoscopy at a sooner date).  WILIAN

## 2023-11-27 NOTE — TELEPHONE ENCOUNTER
Returned call to the patient, colonoscopy rescheduled earlier date per Tangela Espinal NP recommendations, patient verbalized understanding of this.  Golytely called into pharmacy, [facility] [phone], my chart message sent to patient with prep information, patient verbalized understanding of this.

## 2023-11-27 NOTE — TELEPHONE ENCOUNTER
----- Message from Catarina Villegas sent at 11/27/2023  9:11 AM CST -----  Regarding: rt call  Contact: pt  Type:  Patient Returning Call    Who Called:pt  Who Left Message for Patient:Rahel WEIR  Does the patient know what this is regarding?:yes  Would the patient rather a call back or a response via [facility]? Call back  Best Call Back Number:[phone]    Additional Information: sts she had a missed call

## 2023-11-27 NOTE — TELEPHONE ENCOUNTER
Attempted to reach the patient to attempt to move up her colonoscopy, left message, clinic number provided.    Per Tangela Espinal NP-    Thank you for the update,  I was able to review the CT scan and agree we should complete the colonoscopy sooner than scheduled. I will forward this message to Dr. Diaz's staff to assist you with rescheduling the colonoscopy to a sooner date.  Thank you for choosing us to participate in your healthcare,  Tangela ONEIL

## 2023-12-02 ENCOUNTER — PATIENT MESSAGE (OUTPATIENT)
Dept: GASTROENTEROLOGY | Facility: CLINIC | Age: 28
End: 2023-12-02
Payer: COMMERCIAL

## 2023-12-04 ENCOUNTER — TELEPHONE (OUTPATIENT)
Dept: GASTROENTEROLOGY | Facility: CLINIC | Age: 28
End: 2023-12-04
Payer: COMMERCIAL

## 2023-12-04 ENCOUNTER — TELEPHONE (OUTPATIENT)
Dept: SURGERY | Facility: HOSPITAL | Age: 28
End: 2023-12-04
Payer: COMMERCIAL

## 2023-12-04 NOTE — TELEPHONE ENCOUNTER
Pt did not show up for colonoscopy with Dr. Diaz today and unable to reach her after multiple attempts. Please contact pt about rescheduling. Thanks!

## 2023-12-04 NOTE — TELEPHONE ENCOUNTER
Returned call to the patient, Called and spoke with the patient, patient states that [facility] denied having the prescription, called and spoke with [facility] who states that they do not see a prescription for the golytely recently, advised  them that I called the prescription in on 11/27, they are denying the receipt of the prescription, patient was called and rescheduled for procedure after the prescription was given to the pharmacist on duty at this time 10:01 Am, Pharmacist verbalized understanding of the receipt pf the prescription, Patient notified of all of the above and verbalized understanding of this.

## 2023-12-04 NOTE — TELEPHONE ENCOUNTER
----- Message from Cynthia Garcia sent at 12/4/2023 10:23 AM CST -----  Type:  Patient Returning Call    Who Called:  pt   Who Left Message for Patient:  Rahel   Does the patient know what this is regarding?:  yes  Best Call Back Number:  [phone]    Additional Information:  pt stated she would like to confirm pt's appt time will work please call back to advise asap thanks!

## 2023-12-04 NOTE — TELEPHONE ENCOUNTER
----- Message from Hany Almodovar sent at 12/2/2023 11:12 AM CST -----  Type: Needs Medical Advice  Who Called:    Best Call Back Number: [phone]  Additional Information: pt states she called the [facility] pharmacy to check the status of her script Golytely and they told her it has not been sent it, her procedure is on 12/4 pl call bk and advise thanks

## 2023-12-04 NOTE — TELEPHONE ENCOUNTER
----- Message from Kylah Manriquez sent at 12/4/2023  7:31 AM CST -----  Contact: patient  Type: Needs Medical Advice  Who Called:  patient  Best Call Back Number: [phone] (home)   Additional Information: patient was scheduled for a procedure today, prep medication was never called in so she was not able to prep, needs to reschedule appt

## 2023-12-06 ENCOUNTER — HOSPITAL ENCOUNTER (OUTPATIENT)
Facility: HOSPITAL | Age: 28
Discharge: HOME | End: 2023-12-06
Attending: INTERNAL MEDICINE | Admitting: INTERNAL MEDICINE
Payer: COMMERCIAL

## 2023-12-06 ENCOUNTER — ANESTHESIA (OUTPATIENT)
Dept: ENDOSCOPY | Facility: HOSPITAL | Age: 28
End: 2023-12-06
Payer: COMMERCIAL

## 2023-12-06 ENCOUNTER — ANESTHESIA EVENT (OUTPATIENT)
Dept: ENDOSCOPY | Facility: HOSPITAL | Age: 28
End: 2023-12-06
Payer: COMMERCIAL

## 2023-12-06 VITALS
OXYGEN SATURATION: 99 % | WEIGHT: 198 LBS | RESPIRATION RATE: 16 BRPM | SYSTOLIC BLOOD PRESSURE: 111 MMHG | TEMPERATURE: 97 F | BODY MASS INDEX: 36.44 KG/M2 | HEART RATE: 73 BPM | HEIGHT: 62 IN | DIASTOLIC BLOOD PRESSURE: 73 MMHG

## 2023-12-06 DIAGNOSIS — K92.1 HEMATOCHEZIA: ICD-10-CM

## 2023-12-06 LAB
B-HCG UR QL: NEGATIVE
CTP QC/QA: YES
GLUCOSE SERPL-MCNC: 85 MG/DL (ref 70–110)

## 2023-12-06 PROCEDURE — D9220A PRA ANESTHESIA: ICD-10-PCS | Mod: CRNA,,, | Performed by: STUDENT IN AN ORGANIZED HEALTH CARE EDUCATION/TRAINING PROGRAM

## 2023-12-06 PROCEDURE — 63600175 PHARM REV CODE 636 W HCPCS: Mod: PO | Performed by: INTERNAL MEDICINE

## 2023-12-06 PROCEDURE — 88305 TISSUE EXAM BY PATHOLOGIST: CPT | Mod: 26,,, | Performed by: STUDENT IN AN ORGANIZED HEALTH CARE EDUCATION/TRAINING PROGRAM

## 2023-12-06 PROCEDURE — 88305 TISSUE EXAM BY PATHOLOGIST: CPT | Mod: PO | Performed by: STUDENT IN AN ORGANIZED HEALTH CARE EDUCATION/TRAINING PROGRAM

## 2023-12-06 PROCEDURE — 37000009 HC ANESTHESIA EA ADD 15 MINS: Mod: PO | Performed by: INTERNAL MEDICINE

## 2023-12-06 PROCEDURE — 82962 GLUCOSE BLOOD TEST: CPT | Mod: PO | Performed by: INTERNAL MEDICINE

## 2023-12-06 PROCEDURE — 81025 URINE PREGNANCY TEST: CPT | Mod: PO | Performed by: INTERNAL MEDICINE

## 2023-12-06 PROCEDURE — D9220A PRA ANESTHESIA: Mod: ANES,,, | Performed by: ANESTHESIOLOGY

## 2023-12-06 PROCEDURE — 45380 COLONOSCOPY AND BIOPSY: CPT | Mod: PO | Performed by: INTERNAL MEDICINE

## 2023-12-06 PROCEDURE — D9220A PRA ANESTHESIA: Mod: CRNA,,, | Performed by: STUDENT IN AN ORGANIZED HEALTH CARE EDUCATION/TRAINING PROGRAM

## 2023-12-06 PROCEDURE — D9220A PRA ANESTHESIA: ICD-10-PCS | Mod: ANES,,, | Performed by: ANESTHESIOLOGY

## 2023-12-06 PROCEDURE — 45380 COLONOSCOPY AND BIOPSY: CPT | Mod: ,,, | Performed by: INTERNAL MEDICINE

## 2023-12-06 PROCEDURE — 63600175 PHARM REV CODE 636 W HCPCS: Mod: PO | Performed by: STUDENT IN AN ORGANIZED HEALTH CARE EDUCATION/TRAINING PROGRAM

## 2023-12-06 PROCEDURE — 45380 PR COLONOSCOPY,BIOPSY: ICD-10-PCS | Mod: ,,, | Performed by: INTERNAL MEDICINE

## 2023-12-06 PROCEDURE — 37000008 HC ANESTHESIA 1ST 15 MINUTES: Mod: PO | Performed by: INTERNAL MEDICINE

## 2023-12-06 PROCEDURE — 25000003 PHARM REV CODE 250: Mod: PO | Performed by: STUDENT IN AN ORGANIZED HEALTH CARE EDUCATION/TRAINING PROGRAM

## 2023-12-06 PROCEDURE — 27201012 HC FORCEPS, HOT/COLD, DISP: Mod: PO | Performed by: INTERNAL MEDICINE

## 2023-12-06 PROCEDURE — 88305 TISSUE EXAM BY PATHOLOGIST: ICD-10-PCS | Mod: 26,,, | Performed by: STUDENT IN AN ORGANIZED HEALTH CARE EDUCATION/TRAINING PROGRAM

## 2023-12-06 RX ORDER — DICYCLOMINE HYDROCHLORIDE 10 MG/1
10 CAPSULE ORAL
Qty: 120 CAPSULE | Refills: 11 | Status: SHIPPED | OUTPATIENT
Start: 2023-12-06 | End: 2024-12-05

## 2023-12-06 RX ORDER — PROPOFOL 10 MG/ML
VIAL (ML) INTRAVENOUS
Status: DISCONTINUED | OUTPATIENT
Start: 2023-12-06 | End: 2023-12-06

## 2023-12-06 RX ORDER — LIDOCAINE HYDROCHLORIDE 20 MG/ML
INJECTION INTRAVENOUS
Status: DISCONTINUED | OUTPATIENT
Start: 2023-12-06 | End: 2023-12-06

## 2023-12-06 RX ORDER — SODIUM CHLORIDE, SODIUM LACTATE, POTASSIUM CHLORIDE, CALCIUM CHLORIDE 600; 310; 30; 20 MG/100ML; MG/100ML; MG/100ML; MG/100ML
INJECTION, SOLUTION INTRAVENOUS CONTINUOUS
Status: DISCONTINUED | OUTPATIENT
Start: 2023-12-06 | End: 2023-12-06 | Stop reason: HOSPADM

## 2023-12-06 RX ORDER — SODIUM CHLORIDE 0.9 % (FLUSH) 0.9 %
10 SYRINGE (ML) INJECTION
Status: DISCONTINUED | OUTPATIENT
Start: 2023-12-06 | End: 2023-12-06 | Stop reason: HOSPADM

## 2023-12-06 RX ORDER — ONDANSETRON 2 MG/ML
4 INJECTION INTRAMUSCULAR; INTRAVENOUS ONCE
Status: COMPLETED | OUTPATIENT
Start: 2023-12-06 | End: 2023-12-06

## 2023-12-06 RX ADMIN — SODIUM CHLORIDE, POTASSIUM CHLORIDE, SODIUM LACTATE AND CALCIUM CHLORIDE: 600; 310; 30; 20 INJECTION, SOLUTION INTRAVENOUS at 09:12

## 2023-12-06 RX ADMIN — PROPOFOL 50 MG: 10 INJECTION, EMULSION INTRAVENOUS at 10:12

## 2023-12-06 RX ADMIN — SODIUM CHLORIDE, POTASSIUM CHLORIDE, SODIUM LACTATE AND CALCIUM CHLORIDE: 600; 310; 30; 20 INJECTION, SOLUTION INTRAVENOUS at 10:12

## 2023-12-06 RX ADMIN — ONDANSETRON 4 MG: 2 INJECTION INTRAMUSCULAR; INTRAVENOUS at 09:12

## 2023-12-06 RX ADMIN — LIDOCAINE HYDROCHLORIDE 50 MG: 20 INJECTION INTRAVENOUS at 10:12

## 2023-12-06 NOTE — TRANSFER OF CARE
"Anesthesia Transfer of Care Note    Patient: Alyson Vila    Procedure(s) Performed: Procedure(s) (LRB):  COLONOSCOPY (N/A)    Patient location: PACU    Anesthesia Type: general    Transport from OR: Transported from OR on 2-3 L/min O2 by NC with adequate spontaneous ventilation    Post pain: adequate analgesia    Post assessment: no apparent anesthetic complications    Post vital signs: stable    Level of consciousness: responds to stimulation and lethargic    Nausea/Vomiting: no nausea/vomiting    Complications: none    Transfer of care protocol was followed      Last vitals: Visit Vitals  BP (!) 95/51   Pulse 79   Temp 36.3 °C (97.3 °F) (Temporal)   Resp 17   Ht 5' 2" (1.575 m)   Wt 89.8 kg (198 lb)   LMP 11/25/2023 (Approximate)   SpO2 96%   Breastfeeding No   BMI 36.21 kg/m²     "

## 2023-12-06 NOTE — BRIEF OP NOTE
Discharge Note  Short Stay      SUMMARY     Admit Date: 12/6/2023    Attending Physician: Ken Diaz Jr., MD     Discharge Physician: Ken Diaz Jr., MD    Discharge Date: 12/6/2023 11:15 AM    Final Diagnosis: Left lower quadrant abdominal pain [R10.32]  History of colitis [Z87.19]  Bloating [R14.0]  Nausea [R11.0]      Impression:            - The anus is normal.                          - Non-bleeding internal hemorrhoids.                          - The rectum and recto-sigmoid colon are normal.                          - Sigmoid with mild colonic spasm consistent with                          irritable bowel syndrome.                          - The sigmoid colon is otherwise normal.                          - The entire examined colon is normal.                          - The hepatic flexure and cecum are normal.                          Biopsied.                          - The examined portion of the ileum was normal.                          Biopsied.   Recommendation:        - Discharge patient to home.                          - Await pathology results.                          - High fiber diet and low fat diet.                          - Use fiber, for example Citrucel, Fibercon,                          Konsyl or Metamucil.                          - Augmented water consumption diet.                          - Take a PROBIOTIC, such as ALIGN or CULTURELLE or                          KIRAN-Q (all non-prescription), every day for a                          month.                          - Use Bentyl (dicyclomine) 10 mg PO QID 30 min AC.                          - Call the G.I. clinic in 2 weeks for reports (if                          you haven't heard from us sooner) [phone].                          - Repeat colonoscopy at age 46 for screening                          purposes.                          - Continue present medications.                          - Patient has a contact number  available for                          emergencies. The signs and symptoms of potential                          delayed complications were discussed with the                          patient. Return to normal activities tomorrow.                          Written discharge instructions were provided to                          the patient.                          - Return to normal activities tomorrow.   Ken Diaz MD   12/6/2023       Disposition: HOME OR SELF CARE    Patient Instructions:   Current Discharge Medication List        START taking these medications    Details   dicyclomine (BENTYL) 10 MG capsule Take 1 capsule (10 mg total) by mouth 4 (four) times daily before meals and nightly. , to ease colon spasms and diarrhea.  Qty: 120 capsule, Refills: 11           CONTINUE these medications which have NOT CHANGED    Details   cetirizine (ZYRTEC) 10 MG tablet Take 1 tablet (10 mg total) by mouth once daily.  Qty: 90 tablet, Refills: 3      cycloSPORINE (RESTASIS) 0.05 % ophthalmic emulsion Place 1 drop into both eyes 2 (two) times daily.      desvenlafaxine succinate (PRISTIQ) 100 MG Tb24 Take 1 tablet (100 mg total) by mouth once daily.  Qty: 90 tablet, Refills: 3    Associated Diagnoses: GAL (generalized anxiety disorder); Mild episode of recurrent major depressive disorder      dextroamphetamine-amphetamine (ADDERALL XR) 15 MG 24 hr capsule Take 1 capsule (15 mg total) by mouth every morning.  Qty: 30 capsule, Refills: 0    Associated Diagnoses: Attention deficit disorder (ADD) without hyperactivity      meloxicam (MOBIC) 15 MG tablet TAKE 1 TABLET(15 MG) BY MOUTH EVERY DAY  Qty: 30 tablet, Refills: 2    Associated Diagnoses: Pain in both wrists      norethindrone-e.estradioL-iron (MINASTRIN 24 FE) 1 mg-20 mcg(24) /75 mg (4) Chew Take 1 tablet by mouth once daily.      pantoprazole (PROTONIX) 40 MG tablet Take 1 tablet (40 mg total) by mouth before breakfast.  Qty: 30 tablet, Refills: 1     Associated Diagnoses: Right upper quadrant abdominal pain; History of gastroesophageal reflux (GERD); Eructation; Nausea      semaglutide (OZEMPIC) 1 mg/dose (4 mg/3 mL) Inject 1 mg into the skin every 7 days.  Qty: 3 mL, Refills: 11      azelastine (ASTELIN) 137 mcg (0.1 %) nasal spray 1 spray (137 mcg total) by Nasal route 2 (two) times daily. for 7 days  Qty: 30 mL, Refills: 0             Discharge Procedure Orders (must include Diet, Follow-up, Activity)    Follow Up:  Follow up with PCP as per your routine.  Please follow a high fiber diet.  Activity as tolerated.    No driving day of procedure.    PROBIOTICS:  Now that your colon is so cleaned out, now is a good time for a round of PROBIOTICS.  Take a  Probiotic product such as Align or Culturelle or Soniya-Q, every day for a month.                  (The products listed are non-prescription, but you may need to ask the pharmacist for their location.)  Repeat this at least 5-6 times a year.

## 2023-12-06 NOTE — H&P
History & Physical - Short Stay  Gastroenterology      SUBJECTIVE:     Procedure: Colonoscopy    Chief Complaint/Indication for Procedure: Abdo pain, RLQ pain.  Hematochezia.     History of Present Illness:  See GI OV note:  Office Visit   11/15/2023  [facility]        Tangela Espinal FNP  Gastroenterology Right upper quadrant abdominal pain +7 more  Dx Abdominal Pain; Referred by Trinidad Snowden DNP  Reason for Visit      Progress Notes     Tangela Espinal FNP at 11/15/2023  8:00 AM     Status: Signed   Expand All Collapse All  Subjective:         Subjective   Patient ID: Alyson Vila is a 27 y.o. female Body mass index is 36.37 kg/m².     Chief Complaint: Abdominal Pain     This patient is new to me.  Referring Provider: Trinidad Snowden NP for abdominal pain.     Abdominal Pain  This is a new problem. Episode onset: started 11/7/2023. The most recent episode lasted 3 days (of dull pain). The problem has been gradually improving. The pain is located in the RUQ. The pain is at a severity of 2/10 (currently). The quality of the pain is a sensation of fullness (was mostly dull with occasional sharp pains to RUQ; now just feeling bloating to upper abdomen). Associated symptoms include belching (frequent), constipation (had some constipation on 11/1/2023; bowel movements are daily to once every few days of firm stools except yesterday had loose stools (had not had BM since 11/9/2023 since been on liquid diet as advised by PCP)), diarrhea, a fever (tmax on 11/11/2023 was 101.2F, yesterday tmax 99F), flatus, nausea (frequent; zofran PRN) and weight loss (taking ozempic since 3/2023, was out of it for awhile and restarted it ~3 weeks ago at the same dose (takes on sundays)). Pertinent negatives include no dysuria, frequency, hematochezia, melena or vomiting. The pain is aggravated by deep breathing and eating (sitting makes bloating worse). Relieved by: laying.  Treatments tried: liquid/bland diet currently; bentyl 20 mg BID PRN- taking BID. The treatment provided mild relief. Prior diagnostic workup includes ultrasound (scheduled for CT scan to be done soon). Her past medical history is significant for GERD (PAST TREATMENT: omeprazole- helped in the past but not recently when taken once). There is no history of Crohn's disease, gallstones, pancreatitis, PUD or ulcerative colitis.      Review of Systems   Constitutional:  Positive for appetite change (decreased), fever (tmax on 11/11/2023 was 101.2F, yesterday tmax 99F) and weight loss (taking ozempic since 3/2023, was out of it for awhile and restarted it ~3 weeks ago at the same dose (takes on sundays)). Negative for chills and fatigue.        Taking Mobic daily   HENT:  Positive for sore throat (had on 11/11/2023) and trouble swallowing (occasional with liquids (mostly her spit), has to do an extra swallow; patient relates to enlarged thyroid & hashimoto's).    Respiratory:  Negative for cough, choking and shortness of breath.    Cardiovascular:  Negative for chest pain.   Gastrointestinal:  Positive for abdominal pain, anal bleeding (had once on 11/1/2023 with constipation, scant amount of bright red blood on tissue; denies bleeding in between bowel movements), constipation (had some constipation on 11/1/2023; bowel movements are daily to once every few days of firm stools except yesterday had loose stools (had not had BM since 11/9/2023 since been on liquid diet as advised by PCP)), diarrhea, flatus and nausea (frequent; zofran PRN). Negative for blood in stool, hematochezia, melena, rectal pain and vomiting.   Genitourinary:  Negative for difficulty urinating, dysuria, flank pain and frequency.          Wt Readings from Last 15 Encounters:   11/15/23 90.2 kg (198 lb 13.7 oz)   11/10/23 91.1 kg (200 lb 12.8 oz)   10/31/23 90.7 kg (200 lb)   10/11/23 92.9 kg (204 lb 11.2 oz)   10/05/23 98 kg (216 lb)   09/26/23 98 kg  (216 lb)   06/09/23 98.1 kg (216 lb 4.8 oz)   01/13/23 97.5 kg (214 lb 15.2 oz)   11/10/22 96.3 kg (212 lb 4.9 oz)   11/04/22 97.6 kg (215 lb 0.9 oz)   10/31/22 98.1 kg (216 lb 2.6 oz)   07/11/13 73.9 kg (163 lb) (92 %, Z= 1.42)*   04/10/12 67 kg (147 lb 9.9 oz) (87 %, Z= 1.15)*      * Growth percentiles are based on [facility] (Girls, 2-20 Years) data.         Assessment:      Assessment   1. Right upper quadrant abdominal pain    2. History of gastroesophageal reflux (GERD)    3. Eructation    4. Pharyngoesophageal dysphagia    5. Bloating symptom    6. Irregular bowel habits    7. Hematochezia    8. Nausea          Plan:      Plan   Right upper quadrant abdominal pain  -  START   pantoprazole (PROTONIX) 40 MG tablet; Take 1 tablet (40 mg total) by mouth before breakfast.  Dispense: 30 tablet; Refill: 1  - schedule EGD, discussed procedure with patient, including risks and benefits, patient verbalized understanding  - avoid/minimize use of NSAIDs- since they can cause GI upset, bleeding and/or ulcers. If NSAID must be taken, recommend take with food.     History of gastroesophageal reflux (GERD) & Eructation  - START    pantoprazole (PROTONIX) 40 MG tablet; Take 1 tablet (40 mg total) by mouth before breakfast.  Dispense: 30 tablet; Refill: 1  - schedule EGD, discussed procedure with patient, including risks and benefits, patient verbalized understanding     Pharyngoesophageal dysphagia  - schedule EGD, discussed procedure with patient and possible esophageal dilation may be performed during procedure if indicated, patient verbalized understanding  - educated patient to eat smaller more frequent meals and to eat slowly and advised to eat a soft diet.  - possible UGI/esophagram/esophageal manometry if symptoms persist     Bloating symptom  - recommend OTC simethicone as directed, such as Phazyme or Gas-x  - recommend low gas diet: Reduce or eliminate these foods from your diet: Broccoli, Cauliflower, [place] sprouts,  Cabbage, Cooked dried beans, Carbonated beverages (sparkling water, soda, beer, champagne)  Other Causes Of Excess Gas Include:   1) EATING TOO FAST or TALKING WHILE YOU CHEW may cause you to swallow air. This increases the amount of gas in the stomach and may worsen your symptoms.  --> Chew each mouthful completely before swallowing. Take your time.  2) OVEREATING may increase the feeling of being bloated and cause more gas.  --> When you are full, stop eating.  3) CONSTIPATION can increase the amount of normal intestinal gas.  --> Avoid constipation by increasing the amount of fiber in your diet by including whole cereal grains, fresh vegetables (except those in the above list) and fresh fruits. High-fiber foods absorb water and carry it out of the body. When increasing the amount of fiber in your diet, you also need to increase the amount of water that you drink. You should drink at least eight 8-ounce glasses of water (two quarts) per day.  - schedule EGD, discussed procedure with patient, including risks and benefits, patient verbalized understanding  - schedule Colonoscopy, discussed procedure with the patient, including risks and benefits, patient verbalized understanding     Irregular bowel habits  - schedule Colonoscopy, discussed procedure with the patient, including risks and benefits, patient verbalized understanding  - recommended increase fiber in diet, especially soluble fiber since this can help bulk up the stool consistency and may help to slow down how fast the stool goes through the colon and can prevent diarrhea; Recommend high fiber diet (20-30 grams of fiber daily)/OTC fiber supplements daily as directed, such as Benefiber or Metamucil.  - CAN CONTINUE BENTYL 20 MG BID PRN AS DIRECTED     Hematochezia  - schedule Colonoscopy, discussed procedure with the patient, including risks and benefits, patient verbalized understanding  - discussed about different etiologies that can cause rectal  bleeding, such as but not limited to diverticulosis, polyps, colon mass, colon inflammation or infection, anal fissure or hemorrhoids.   - You may resume normal activity as long as you feel well.  - Avoid/minimize NSAIDs such as ibuprofen (Advil, Motrin) and naproxen (Aleve and Naprosyn).  - Avoid alcohol.     Nausea  -  START   pantoprazole (PROTONIX) 40 MG tablet; Take 1 tablet (40 mg total) by mouth before breakfast.  Dispense: 30 tablet; Refill: 1  - schedule EGD, discussed procedure with patient, including risks and benefits, patient verbalized understanding  - CONTINUE ZOFRAN PRN AS DIRECTED FOR NAUSEA  - discussed with patient that certain medications, such as Ozempic, may be contributing to symptoms. I recommend follow-up with provider who manages medication to discuss about possible alternative therapy, patient verbalized understanding     Follow up in about 1 month (around 12/15/2023), or if symptoms worsen or fail to improve.               Then, was seen in [facility] ER 11/22/2023, with CT Scan:  Patient presents with  Abdominal Pain    History of Present IllnessAlyson Vila is a 28 y.o. female with history of PCOS, anxiety, depression, Hashimoto's disease and seasonal allergies presenting to the emergency department for evaluation of abdominal pain. Patient reports approximately 3 weeks ago she began with pain to the right upper quadrant of her abdomen. Reports she was seen by GI and had a ultrasound performed which was unremarkable. Reports she had labs drawn as well that were normal. Patient reports pain has now moved to the right lower quadrant of her abdomen. Reports pain does radiate to the left lower quadrant. Reports pain became severe today. She denies nausea, vomiting or diarrhea. Reports she did feel as though she may have had a fever earlier today. She denies vaginal bleeding or discharge. She denies urinary complaints. She denies pregnancy.  The history is provided by the patient. No   was used.     CT Abdomen Pelvis W Contrast    Result Date: 11/22/2023  REASON FOR EXAM: LLQ abdominal pain / RLQ abdominal pain  FINDINGS:   Soft tissues/Musculature: Unremarkable   Osseous Structures: Unremarkable   Lung bases: Unremarkable   Aorta/Vasculature: Unremarkable   Lymph nodes/Mesentery: Unremarkable   Liver: Unremarkable   Gallbladder/Biliary System: Unremarkable   Pancreas: Unremarkable   Spleen: Unremarkable   Adrenal glands: Unremarkable   Kidneys/Ureters: Unremarkable   Urinary bladder: Unremarkable   Reproductive organs: Normal-sized ovaries.   Peritoneum: Unremarkable   Bowel: Normal appendix. Mild mucosal hyperenhancement.   IMPRESSION:   1. Mild sigmoid colon mucosal hyperenhancement. Nonspecific but can suggest colitis.   2. Normal appendix.   Electronically signed by Jason Nicholas MD on 11/22/2023         PTA Medications   Medication Sig    cetirizine (ZYRTEC) 10 MG tablet Take 1 tablet (10 mg total) by mouth once daily.    cycloSPORINE (RESTASIS) 0.05 % ophthalmic emulsion Place 1 drop into both eyes 2 (two) times daily.    desvenlafaxine succinate (PRISTIQ) 100 MG Tb24 Take 1 tablet (100 mg total) by mouth once daily.    dextroamphetamine-amphetamine (ADDERALL XR) 15 MG 24 hr capsule Take 1 capsule (15 mg total) by mouth every morning.    meloxicam (MOBIC) 15 MG tablet TAKE 1 TABLET(15 MG) BY MOUTH EVERY DAY    norethindrone-e.estradioL-iron (MINASTRIN 24 FE) 1 mg-20 mcg(24) /75 mg (4) Chew Take 1 tablet by mouth once daily.    pantoprazole (PROTONIX) 40 MG tablet Take 1 tablet (40 mg total) by mouth before breakfast.    semaglutide (OZEMPIC) 1 mg/dose (4 mg/3 mL) Inject 1 mg into the skin every 7 days.    azelastine (ASTELIN) 137 mcg (0.1 %) nasal spray 1 spray (137 mcg total) by Nasal route 2 (two) times daily. for 7 days       Review of patient's allergies indicates:   Allergen Reactions    Grass pollen-sweet vernal grass, std. Itching and Tinitus    Ragweed Itching  "and Tinitus        Past Medical History:   Diagnosis Date    Insulin resistance     PCOS (polycystic ovarian syndrome)     Thyroid disease     Hashimoto's     History reviewed. No pertinent surgical history.  Family History   Problem Relation Age of Onset    Alcohol abuse Mother     Cervical cancer Mother     Colon cancer Neg Hx     Crohn's disease Neg Hx     Esophageal cancer Neg Hx     Ulcerative colitis Neg Hx     Stomach cancer Neg Hx      Social History     Tobacco Use    Smoking status: Never   Substance Use Topics    Alcohol use: Yes     Comment: Socially only. Maybe 2x per month    Drug use: Never         OBJECTIVE:     Vital Signs (Most Recent)  Temp: 97.3 °F (36.3 °C) (12/06/23 0857)  Pulse: 79 (12/06/23 0857)  Resp: 17 (12/06/23 0857)  BP: 111/70 (12/06/23 0857)  SpO2: 96 % (12/06/23 0857)    Physical Exam:  : Ht: 5' 2" (157.5 cm)   Wt: 89.8 kg (198 lb)   BMI: 36.21 kg/m² .                                                       GENERAL:  Comfortable, in no acute distress.                                 HEENT EXAM:  Nonicteric.  No adenopathy.  Oropharynx is clear.               NECK:  Supple.                                                               LUNGS:  Clear.                                                               CARDIAC:  Regular rate and rhythm.  S1, S2.  No murmur.                      ABDOMEN:  Obese.  Soft, positive bowel sounds, nontender.  No hepatosplenomegaly or masses.  No rebound or guarding.                                             EXTREMITIES:  No edema.     MENTAL STATUS:  Alert and oriented.    ASSESSMENT/PLAN:     Assessment: Abdo pain, RLQ pain.  Hematochezia.    Plan: Colonoscopy    Anesthesia Plan:   MAC / General Anaesthesia    ASA Grade: ASA 2 - Patient with mild systemic disease with no functional limitations    MALLAMPATI SCORE: II (hard and soft palate, upper portion of tonsils anduvula visible)    "

## 2023-12-06 NOTE — DISCHARGE INSTRUCTIONS
Recovery After Procedural Sedation (Adult)   You have been given medicine by vein to make you sleep during your surgery. This may have included both a pain medicine and sleeping medicine. Most of the effects have worn off. But you may still have some drowsiness for the next 6 to 8 hours.  Home care  Follow these guidelines when you get home:  For the next 8 hours, you should be watched by a responsible adult. This person should make sure your condition is not getting worse.  Don't drink any alcohol for the next 24 hours.  Don't drive, operate dangerous machinery, or make important business or personal decisions during the next 24 hours.  To prevent injury or falls, use caution when standing and walking for at least 24 hours after your procedure.  Note: Your healthcare provider may tell you not to take any medicine by mouth for pain or sleep in the next 4 hours. These medicines may react with the medicines you were given in the hospital. This could cause a much stronger response than usual.  Follow-up care  Follow up with your healthcare provider if you are not alert and back to your usual level of activity within 12 hours.  When to seek medical advice  Call your healthcare provider right away if any of these occur:  Drowsiness gets worse  Weakness or dizziness gets worse  Repeated vomiting  You can't be awakened  Fever  New rash  [employer] last reviewed this educational content on 9/1/2019  © 4696-8166 [employer]. [address] [place] [address], [place] [address]. All rights reserved. This information is not intended as a substitute for professional medical care. Always follow your healthcare professional's instructions         PROBIOTICS:  Now that your colon is so cleaned out, now is a good time for a round of PROBIOTICS.  Take a  Probiotic product such as Align or Culturelle or Soniya-Q, every day for a month.                  (The products listed are non-prescription, but you may need to ask the pharmacist  for their location.)  Repeat this at least 5-6 times a year.        High-Fiber Diet  Fiber is in fruits, vegetables, cereals, and grains. Fiber passes through your body undigested. A high-fiber diet helps food move through your intestinal tract. The added bulk is helpful in preventing constipation. In people with diverticulosis, fiber helps clean out the pouches along the colon wall. It also prevents new pouches from forming. A high-fiber diet reduces the risk of colon cancer. It also lowers blood cholesterol and prevents high blood sugar in people with diabetes.    The fiber-rich foods listed below should be part of your diet. If you are not used to high-fiber foods, start with 1 or 2 foods from this list. Every 3 to 4 days add a new one to your diet. Do this until you are eating 4 high-fiber foods per day. This should give you 20 to 35 grams of fiber a day. It is also important to drink a lot of water when you are on this diet. You should have 6 to 8 glasses of water a day. Water makes the fiber swell and increases the benefit.  Foods high in dietary fiber  The following foods are high in dietary fiber:  Breads. Breads made with 100% whole-wheat flour; jenn, wheat, or rye crackers; whole-grain tortillas, bran muffins.  Cereals. Whole-grain and bran cereals with bran (shredded wheat, wheat flakes, raisin bran, corn bran); oatmeal, rolled oats, granola, and brown rice.  Fruits. Fresh fruits and their edible skins (pears, prunes, raisins, berries, apples, and apricots); bananas, citrus fruit, mangoes, pineapple; and prune juice.  Nuts. Any nuts and seeds.  Vegetables. Best served raw or lightly cooked. All types, especially: green peas, celery, eggplant, potatoes, spinach, broccoli, [place] sprouts, winter squash, carrots, cauliflower, soybeans, lentils, and fresh and dried beans of all kinds.  Other. Popcorn, any spices.  Date Last Reviewed: 8/1/2016  © 1783-9529 [employer]. [address] [place] [address],  [place] [address]. All rights reserved. This information is not intended as a substitute for professional medical care. Always follow your healthcare professional's instructions.

## 2023-12-06 NOTE — ANESTHESIA PREPROCEDURE EVALUATION
12/06/2023  Alyson Vila is a 28 y.o., female.      Pre-op Assessment    I have reviewed the Patient Summary Reports.     I have reviewed the Nursing Notes. I have reviewed the NPO Status.   I have reviewed the Medications.     Review of Systems  Anesthesia Hx:             Denies Family Hx of Anesthesia complications.    Denies Personal Hx of Anesthesia complications.                    OB/GYN/PEDS:  PCOS           Endocrine:        Obesity / BMI > 30  Psych:   anxiety depression                Physical Exam  General: Cooperative, Alert and Oriented    Airway:  Mallampati: II   Mouth Opening: Normal  TM Distance: Normal  Tongue: Normal  Neck ROM: Normal ROM  Neck: Girth Increased        Anesthesia Plan  Type of Anesthesia, risks & benefits discussed:    Anesthesia Type: Gen Natural Airway  Intra-op Monitoring Plan: Standard ASA Monitors  Induction:  IV  Informed Consent: Informed consent signed with the Patient and all parties understand the risks and agree with anesthesia plan.  All questions answered.   ASA Score: 2    Ready For Surgery From Anesthesia Perspective.     .

## 2023-12-06 NOTE — ANESTHESIA POSTPROCEDURE EVALUATION
Anesthesia Post Evaluation    Patient: Alyson Vila    Procedure(s) Performed: Procedure(s) (LRB):  COLONOSCOPY (N/A)    Final Anesthesia Type: general      Patient location during evaluation: PACU  Patient participation: Yes- Able to Participate  Level of consciousness: awake and alert  Post-procedure vital signs: reviewed and stable  Pain management: adequate  Airway patency: patent    PONV status at discharge: No PONV  Anesthetic complications: no      Cardiovascular status: blood pressure returned to baseline  Respiratory status: unassisted  Hydration status: euvolemic  Follow-up not needed.              Vitals Value Taken Time   /73 12/06/23 1115   Temp 36 °C (96.8 °F) 12/06/23 1052   Pulse 73 12/06/23 1115   Resp 16 12/06/23 1115   SpO2 99 % 12/06/23 1115         Event Time   Out of Recovery 11:22:00         Pain/Obi Score: Obi Score: 10 (12/6/2023 11:30 AM)

## 2023-12-06 NOTE — PROVATION PATIENT INSTRUCTIONS
Discharge Summary/Instructions for after Colonoscopy with   Biopsy/Polypectomy  Alyson Vila    Wednesday, December 6, 2023  Ken Diaz MD  RESTRICTIONS ON ACTIVITY:  - Do not drive a car or operate machinery until the day after the procedure.      - The following day: return to full activity including work.  - For  3 days: No heavy lifting, straining or running.  - Diet: You can have solid foods, but no gassy foods (i.e. beans, broccoli,   cabbage, etc).  TREATMENT FOR COMMON SIDE EFFECTS:  - Mild abdominal pain and bloating or excessive gas: rest, eat lightly and   use a heating pad.  SYMPTOMS TO WATCH FOR AND REPORT TO YOUR PHYSICIAN:  1. Severe abdominal pain.  2. Fever within 24 hours after a procedure.  3. A large amount of rectal bleeding. (A small amount of blood from the   rectum is not serious, especially if hemorrhoids are present.  3.  Because air was put into your colon during the procedure, expelling   large amounts of air from your rectum is normal.  4.  You may not have a bowel movement for 1-3 days because of the   colonoscopy prep.  This is normal.  5.  Call immediately if you notice any of the following:   Chills and/or fever over 101   Persistent vomiting   Severe abdominal pain, other than gas cramps   Severe chest pain   Black, tarry stools   Any bleeding - exceeding one tablespoon  Your doctor recommends these additional instructions:  You are being discharged to home.   We are waiting for your pathology results.   Eat a high fiber diet and eat a low fat diet.   Take a fiber supplement, for example Citrucel, Fibercon, Konsyl or   Metamucil.   Or Fiber Gummies.  Take a PROBIOTIC, such as ALIGN or CULTURELLE or KIRAN-Q (all   non-prescription), every day for a month.   And repeat this at least 5-6   times a year.  Take Bentyl (dicyclomine) 10 mg by mouth four times per day 30 minutes   before meals and at bedtime, as needed, to relieve spasms and diarrhea.   Your physician has  recommended a repeat colonoscopy at age 45 (please   contact the clinic prior to your 46th birthday to schedule) for screening   purposes.  None  If you have any questions or problems, please call your physician.  EMERGENCY PHONE NUMBER: [phone]  LAB RESULTS: Call in two (2) weeks for lab results, [phone]  ___________________________________________  Nurse Signature  ___________________________________________  Patient/Designated Responsible Party Signature  Ken Diaz MD  12/6/2023 11:13:17 AM  This report has been verified and signed electronically.  Dear patient,  As a result of recent federal legislation (The Federal Cures Act), you may   receive lab or pathology results from your procedure in your [facility]   account before your physician is able to contact you. Your physician or   their representative will relay the results to you with their   recommendations at their soonest availability.  Thank you.  PROVATION

## 2023-12-08 DIAGNOSIS — F98.8 ATTENTION DEFICIT DISORDER (ADD) WITHOUT HYPERACTIVITY: ICD-10-CM

## 2023-12-08 LAB
FINAL PATHOLOGIC DIAGNOSIS: NORMAL
GROSS: NORMAL
Lab: NORMAL

## 2023-12-11 RX ORDER — DEXTROAMPHETAMINE SACCHARATE, AMPHETAMINE ASPARTATE MONOHYDRATE, DEXTROAMPHETAMINE SULFATE AND AMPHETAMINE SULFATE 3.75; 3.75; 3.75; 3.75 MG/1; MG/1; MG/1; MG/1
15 CAPSULE, EXTENDED RELEASE ORAL EVERY MORNING
Qty: 30 CAPSULE | Refills: 0 | Status: SHIPPED | OUTPATIENT
Start: 2023-12-11 | End: 2024-01-09 | Stop reason: SDUPTHER

## 2023-12-21 ENCOUNTER — PATIENT MESSAGE (OUTPATIENT)
Dept: GASTROENTEROLOGY | Facility: CLINIC | Age: 28
End: 2023-12-21
Payer: COMMERCIAL

## 2023-12-28 ENCOUNTER — PATIENT MESSAGE (OUTPATIENT)
Dept: GASTROENTEROLOGY | Facility: CLINIC | Age: 28
End: 2023-12-28
Payer: COMMERCIAL

## 2024-01-02 RX ORDER — ONDANSETRON HYDROCHLORIDE 8 MG/1
TABLET, FILM COATED ORAL
Qty: 15 TABLET | Refills: 0 | Status: SHIPPED | OUTPATIENT
Start: 2024-01-02

## 2024-01-02 NOTE — TELEPHONE ENCOUNTER
Refill Routing Note   Medication(s) are not appropriate for processing by [facility] for the following reason(s):        Outside of protocol    [facility] action(s):  Route               Appointments  past 12m or future 3m with PCP    Date Provider   Last Visit   11/7/2023 Irasema Jaquez MD   Next Visit   Visit date not found Irasema Jaquez MD   ED visits in past 90 days: 0        Note composed:8:54 AM 01/02/2024

## 2024-01-09 ENCOUNTER — DOCUMENTATION ONLY (OUTPATIENT)
Dept: REHABILITATION | Facility: HOSPITAL | Age: 29
End: 2024-01-09
Payer: COMMERCIAL

## 2024-01-09 DIAGNOSIS — F98.8 ATTENTION DEFICIT DISORDER (ADD) WITHOUT HYPERACTIVITY: ICD-10-CM

## 2024-01-09 RX ORDER — DEXTROAMPHETAMINE SACCHARATE, AMPHETAMINE ASPARTATE MONOHYDRATE, DEXTROAMPHETAMINE SULFATE AND AMPHETAMINE SULFATE 3.75; 3.75; 3.75; 3.75 MG/1; MG/1; MG/1; MG/1
15 CAPSULE, EXTENDED RELEASE ORAL EVERY MORNING
Qty: 30 CAPSULE | Refills: 0 | Status: SHIPPED | OUTPATIENT
Start: 2024-01-09 | End: 2024-02-08

## 2024-01-09 NOTE — PROGRESS NOTES
Occupational Therapy Discharge Note    2024    Pt was referred by Dr. Fitzgerald and seen for initial evaluation on 10/3/2023 for pain in B wrists, possible CTS and possible TFCC involvement. Pt was seen for 2  OT visits for treatment of  B wrist pain, decreased ROM R wrist and FA, decreased  B hands, decreased ADL, paresthesias B hands . Please see last note, dated 10/26/23,  for last objective measures as well as goal achievements/modifications. Pt has not returned to therapy and pt's POC  on . Current status is not known.     Discharge OT services to [facility]    JORGE Hassan CHT

## 2024-01-10 NOTE — TELEPHONE ENCOUNTER
The patient was checked in the [facility] [employer]'s  Prescription Monitoring Program. There appears to be no incongruities with the patient's verbalized history.

## 2024-01-24 ENCOUNTER — ANESTHESIA (OUTPATIENT)
Dept: ENDOSCOPY | Facility: HOSPITAL | Age: 29
End: 2024-01-24
Payer: COMMERCIAL

## 2024-01-24 ENCOUNTER — ANESTHESIA EVENT (OUTPATIENT)
Dept: ENDOSCOPY | Facility: HOSPITAL | Age: 29
End: 2024-01-24
Payer: COMMERCIAL

## 2024-01-24 ENCOUNTER — HOSPITAL ENCOUNTER (OUTPATIENT)
Facility: HOSPITAL | Age: 29
Discharge: HOME | End: 2024-01-24
Attending: INTERNAL MEDICINE | Admitting: INTERNAL MEDICINE
Payer: COMMERCIAL

## 2024-01-24 VITALS
HEIGHT: 62 IN | TEMPERATURE: 97 F | BODY MASS INDEX: 34.96 KG/M2 | WEIGHT: 190 LBS | SYSTOLIC BLOOD PRESSURE: 127 MMHG | RESPIRATION RATE: 14 BRPM | DIASTOLIC BLOOD PRESSURE: 91 MMHG | HEART RATE: 90 BPM | OXYGEN SATURATION: 100 %

## 2024-01-24 DIAGNOSIS — R10.11 RUQ PAIN: ICD-10-CM

## 2024-01-24 LAB
B-HCG UR QL: NEGATIVE
CTP QC/QA: YES

## 2024-01-24 PROCEDURE — 88305 TISSUE EXAM BY PATHOLOGIST: CPT | Mod: 26,,, | Performed by: STUDENT IN AN ORGANIZED HEALTH CARE EDUCATION/TRAINING PROGRAM

## 2024-01-24 PROCEDURE — 43239 EGD BIOPSY SINGLE/MULTIPLE: CPT | Mod: ,,, | Performed by: INTERNAL MEDICINE

## 2024-01-24 PROCEDURE — 37000009 HC ANESTHESIA EA ADD 15 MINS: Mod: PO | Performed by: INTERNAL MEDICINE

## 2024-01-24 PROCEDURE — 88342 IMHCHEM/IMCYTCHM 1ST ANTB: CPT | Mod: PO | Performed by: STUDENT IN AN ORGANIZED HEALTH CARE EDUCATION/TRAINING PROGRAM

## 2024-01-24 PROCEDURE — 27201012 HC FORCEPS, HOT/COLD, DISP: Mod: PO | Performed by: INTERNAL MEDICINE

## 2024-01-24 PROCEDURE — 43239 EGD BIOPSY SINGLE/MULTIPLE: CPT | Mod: PO | Performed by: INTERNAL MEDICINE

## 2024-01-24 PROCEDURE — 63600175 PHARM REV CODE 636 W HCPCS: Mod: PO | Performed by: NURSE ANESTHETIST, CERTIFIED REGISTERED

## 2024-01-24 PROCEDURE — 25000003 PHARM REV CODE 250: Mod: PO | Performed by: NURSE ANESTHETIST, CERTIFIED REGISTERED

## 2024-01-24 PROCEDURE — 88342 IMHCHEM/IMCYTCHM 1ST ANTB: CPT | Mod: 26,,, | Performed by: STUDENT IN AN ORGANIZED HEALTH CARE EDUCATION/TRAINING PROGRAM

## 2024-01-24 PROCEDURE — D9220A PRA ANESTHESIA: Mod: ANES,,, | Performed by: ANESTHESIOLOGY

## 2024-01-24 PROCEDURE — D9220A PRA ANESTHESIA: Mod: CRNA,,, | Performed by: NURSE ANESTHETIST, CERTIFIED REGISTERED

## 2024-01-24 PROCEDURE — 63600175 PHARM REV CODE 636 W HCPCS: Mod: PO | Performed by: INTERNAL MEDICINE

## 2024-01-24 PROCEDURE — 88305 TISSUE EXAM BY PATHOLOGIST: CPT | Mod: 59,PO | Performed by: STUDENT IN AN ORGANIZED HEALTH CARE EDUCATION/TRAINING PROGRAM

## 2024-01-24 PROCEDURE — 81025 URINE PREGNANCY TEST: CPT | Mod: PO | Performed by: INTERNAL MEDICINE

## 2024-01-24 PROCEDURE — 37000008 HC ANESTHESIA 1ST 15 MINUTES: Mod: PO | Performed by: INTERNAL MEDICINE

## 2024-01-24 RX ORDER — LIDOCAINE HYDROCHLORIDE 20 MG/ML
INJECTION INTRAVENOUS
Status: DISCONTINUED | OUTPATIENT
Start: 2024-01-24 | End: 2024-01-24

## 2024-01-24 RX ORDER — SODIUM CHLORIDE 0.9 % (FLUSH) 0.9 %
10 SYRINGE (ML) INJECTION
Status: DISCONTINUED | OUTPATIENT
Start: 2024-01-24 | End: 2024-01-24 | Stop reason: HOSPADM

## 2024-01-24 RX ORDER — PROPOFOL 10 MG/ML
VIAL (ML) INTRAVENOUS
Status: DISCONTINUED | OUTPATIENT
Start: 2024-01-24 | End: 2024-01-24

## 2024-01-24 RX ORDER — SODIUM CHLORIDE, SODIUM LACTATE, POTASSIUM CHLORIDE, CALCIUM CHLORIDE 600; 310; 30; 20 MG/100ML; MG/100ML; MG/100ML; MG/100ML
INJECTION, SOLUTION INTRAVENOUS CONTINUOUS
Status: DISCONTINUED | OUTPATIENT
Start: 2024-01-24 | End: 2024-01-24 | Stop reason: HOSPADM

## 2024-01-24 RX ADMIN — PROPOFOL 50 MG: 10 INJECTION, EMULSION INTRAVENOUS at 09:01

## 2024-01-24 RX ADMIN — LIDOCAINE HYDROCHLORIDE 140 MG: 20 INJECTION INTRAVENOUS at 09:01

## 2024-01-24 RX ADMIN — SODIUM CHLORIDE, POTASSIUM CHLORIDE, SODIUM LACTATE AND CALCIUM CHLORIDE: 600; 310; 30; 20 INJECTION, SOLUTION INTRAVENOUS at 08:01

## 2024-01-24 RX ADMIN — GLYCOPYRROLATE 0.2 MG: 0.2 INJECTION, SOLUTION INTRAMUSCULAR; INTRAVENOUS at 08:01

## 2024-01-24 RX ADMIN — PROPOFOL 100 MG: 10 INJECTION, EMULSION INTRAVENOUS at 09:01

## 2024-01-24 NOTE — PROVATION PATIENT INSTRUCTIONS
Discharge Summary/Instructions after an Endoscopic Procedure  Patient Name: Alyson Vila  Patient MRN: [id number]  Patient YOB: 1995 Wednesday, January 24, 2024  Ken Diaz MD  Dear patient,  As a result of recent federal legislation (The Federal Cures Act), you may   receive lab or pathology results from your procedure in your [facility]   account before your physician is able to contact you. Your physician or   their representative will relay the results to you with their   recommendations at their soonest availability.  Thank you,  RESTRICTIONS:  During your procedure today, you received medications for sedation.  These   medications may affect your judgment, balance and coordination.  Therefore,   for 24 hours, you have the following restrictions:   - DO NOT drive a car, operate machinery, make legal/financial decisions,   sign important papers or drink alcohol.    ACTIVITY:  Today: no heavy lifting, straining or running due to procedural   sedation/anesthesia.  The following day: return to full activity including work.  DIET:  Eat and drink normally unless instructed otherwise.     TREATMENT FOR COMMON SIDE EFFECTS:  - Mild abdominal pain, nausea, belching, bloating or excessive gas:  rest,   eat lightly and use a heating pad.  - Sore Throat: treat with throat lozenges and/or gargle with warm salt   water.  - Because air was used during the procedure, expelling large amounts of air   from your rectum or belching is normal.  - If a bowel prep was taken, you may not have a bowel movement for 1-3 days.    This is normal.  SYMPTOMS TO WATCH FOR AND REPORT TO YOUR PHYSICIAN:  1. Abdominal pain or bloating, other than gas cramps.  2. Chest pain.  3. Back pain.  4. Signs of infection such as: chills or fever occurring within 24 hours   after the procedure.  5. Rectal bleeding, which would show as bright red, maroon, or black stools.   (A tablespoon of blood from the rectum is not serious,  especially if   hemorrhoids are present.)  6. Vomiting.  7. Weakness or dizziness.  GO DIRECTLY TO THE NEAREST EMERGENCY ROOM IF YOU HAVE ANY OF THE FOLLOWING:      Difficulty breathing              Chills and/or fever over 101 F   Persistent vomiting and/or vomiting blood   Severe abdominal pain   Severe chest pain   Black, tarry stools   Bleeding- more than one tablespoon   Any other symptom or condition that you feel may need urgent attention  Your doctor recommends these additional instructions:  If any biopsies were taken, your doctors clinic will contact you in 1 to 2   weeks with any results.  Follow an antireflux regimen.  This includes:       - Do not lie down for at least 3 to 4 hours after meals.        - Raise the head of the bed 4 to 6 inches.        - Decrease excess weight.        - Avoid citrus juices and other acidic foods, alcohol, chocolate, mints,   coffee and other caffeinated beverages, carbonated beverages, fatty and   fried foods.        - Avoid tight-fitting clothing.        - Avoid cigarettes and other tobacco products.   Especially:   Exercise and weight loss.   Continue your present medications.   Take Protonix (pantoprazole) 40 mg by mouth once a day.   Call the G.I. clinic in 2 weeks for reports (if you haven't heard from us   sooner)  [phone].  Return to your GI clinic in 2-3 months.  For questions, problems or results please call your physician - Ken Diaz MD at Work:  [phone].  EMERGENCY PHONE NUMBER: [phone], LAB RESULTS: [phone]  IF A COMPLICATION OR EMERGENCY SITUATION ARISES AND YOU ARE UNABLE TO REACH   YOUR PHYSICIAN - GO DIRECTLY TO THE EMERGENCY ROOM.  ___________________________________________  Nurse Signature  ___________________________________________  Patient/Designated Responsible Party Signature  Ken Diaz MD  1/24/2024 9:36:33 AM  This report has been verified and signed electronically.  Dear patient,  As a result of recent  federal legislation (The Federal Cures Act), you may   receive lab or pathology results from your procedure in your [facility]   account before your physician is able to contact you. Your physician or   their representative will relay the results to you with their   recommendations at their soonest availability.  Thank you.  PROVATION

## 2024-01-24 NOTE — ANESTHESIA POSTPROCEDURE EVALUATION
Anesthesia Post Evaluation    Patient: Alyson Vila    Procedure(s) Performed: Procedure(s) (LRB):  EGD (ESOPHAGOGASTRODUODENOSCOPY) (N/A)    Final Anesthesia Type: general      Patient location during evaluation: PACU  Patient participation: Yes- Able to Participate  Level of consciousness: awake and alert  Post-procedure vital signs: reviewed and stable  Pain management: adequate  Airway patency: patent    PONV status at discharge: No PONV  Anesthetic complications: no      Cardiovascular status: blood pressure returned to baseline  Respiratory status: unassisted  Hydration status: euvolemic  Follow-up not needed.              Vitals Value Taken Time   /91 01/24/24 0951   Temp  01/24/24 0956   Pulse 90 01/24/24 0951   Resp 14 01/24/24 0951   SpO2 100 % 01/24/24 0932         Event Time   Out of Recovery 09:51:28         Pain/Obi Score: Obi Score: 10 (1/24/2024  9:50 AM)

## 2024-01-24 NOTE — TRANSFER OF CARE
"Anesthesia Transfer of Care Note    Patient: Alyson Vila    Procedure(s) Performed: Procedure(s) (LRB):  EGD (ESOPHAGOGASTRODUODENOSCOPY) (N/A)    Patient location: PACU    Anesthesia Type: general    Transport from OR: Transported from OR on room air with adequate spontaneous ventilation    Post pain: adequate analgesia    Post assessment: no apparent anesthetic complications and tolerated procedure well    Post vital signs: stable    Level of consciousness: awake, alert and oriented    Nausea/Vomiting: no nausea/vomiting    Complications: none    Transfer of care protocol was followed    Last vitals: Visit Vitals  /76   Pulse 93   Temp 36.3 °C (97.3 °F)   Resp 17   Ht 5' 2" (1.575 m)   Wt 86.2 kg (190 lb)   SpO2 100%   Breastfeeding No   BMI 34.75 kg/m²     "

## 2024-01-24 NOTE — H&P
History & Physical - Short Stay  Gastroenterology      SUBJECTIVE:     Procedure: Gastroscopy    Chief Complaint/Indication for Procedure: RUQ pain.  Hx of GERD.  Pharyngoesophageal dysphagia.  Bloating.      History of Present Illness:  See GI OV note:  Office Visit   11/15/2023  [facility]        Tangela Espinal FNP  Gastroenterology Right upper quadrant abdominal pain +7 more  Dx Abdominal Pain; Referred by Trinidad Snowden DNP  Reason for Visit      Progress Notes     Tangela Espinal FNP at 11/15/2023  8:00 AM     Status: Signed   Expand All Collapse All  Subjective:         Subjective   Patient ID: Alyson Vila is a 27 y.o. female Body mass index is 36.37 kg/m².     Chief Complaint: Abdominal Pain     This patient is new to me.  Referring Provider: Trinidad Snowden NP for abdominal pain.     Abdominal Pain  This is a new problem. Episode onset: started 11/7/2023. The most recent episode lasted 3 days (of dull pain). The problem has been gradually improving. The pain is located in the RUQ. The pain is at a severity of 2/10 (currently). The quality of the pain is a sensation of fullness (was mostly dull with occasional sharp pains to RUQ; now just feeling bloating to upper abdomen). Associated symptoms include belching (frequent), constipation (had some constipation on 11/1/2023; bowel movements are daily to once every few days of firm stools except yesterday had loose stools (had not had BM since 11/9/2023 since been on liquid diet as advised by PCP)), diarrhea, a fever (tmax on 11/11/2023 was 101.2F, yesterday tmax 99F), flatus, nausea (frequent; zofran PRN) and weight loss (taking ozempic since 3/2023, was out of it for awhile and restarted it ~3 weeks ago at the same dose (takes on sundays)). Pertinent negatives include no dysuria, frequency, hematochezia, melena or vomiting. The pain is aggravated by deep breathing and eating (sitting makes bloating worse).  Relieved by: laying. Treatments tried: liquid/bland diet currently; bentyl 20 mg BID PRN- taking BID. The treatment provided mild relief. Prior diagnostic workup includes ultrasound (scheduled for CT scan to be done soon). Her past medical history is significant for GERD (PAST TREATMENT: omeprazole- helped in the past but not recently when taken once). There is no history of Crohn's disease, gallstones, pancreatitis, PUD or ulcerative colitis.      Review of Systems   Constitutional:  Positive for appetite change (decreased), fever (tmax on 11/11/2023 was 101.2F, yesterday tmax 99F) and weight loss (taking ozempic since 3/2023, was out of it for awhile and restarted it ~3 weeks ago at the same dose (takes on sundays)). Negative for chills and fatigue.        Taking Mobic daily   HENT:  Positive for sore throat (had on 11/11/2023) and trouble swallowing (occasional with liquids (mostly her spit), has to do an extra swallow; patient relates to enlarged thyroid & hashimoto's).    Respiratory:  Negative for cough, choking and shortness of breath.    Cardiovascular:  Negative for chest pain.   Gastrointestinal:  Positive for abdominal pain, anal bleeding (had once on 11/1/2023 with constipation, scant amount of bright red blood on tissue; denies bleeding in between bowel movements), constipation (had some constipation on 11/1/2023; bowel movements are daily to once every few days of firm stools except yesterday had loose stools (had not had BM since 11/9/2023 since been on liquid diet as advised by PCP)), diarrhea, flatus and nausea (frequent; zofran PRN). Negative for blood in stool, hematochezia, melena, rectal pain and vomiting.   Genitourinary:  Negative for difficulty urinating, dysuria, flank pain and frequency.      Wt Readings from Last 20 Encounters:   01/19/24 86.2 kg (190 lb)   12/05/23 89.8 kg (198 lb)   11/15/23 90.2 kg (198 lb 13.7 oz)   11/10/23 91.1 kg (200 lb 12.8 oz)   10/31/23 90.7 kg (200 lb)    10/11/23 92.9 kg (204 lb 11.2 oz)   10/05/23 98 kg (216 lb)   09/26/23 98 kg (216 lb)   06/09/23 98.1 kg (216 lb 4.8 oz)   01/13/23 97.5 kg (214 lb 15.2 oz)   11/10/22 96.3 kg (212 lb 4.9 oz)   11/04/22 97.6 kg (215 lb 0.9 oz)   10/31/22 98.1 kg (216 lb 2.6 oz)   07/11/13 73.9 kg (163 lb) (92 %, Z= 1.42)*   04/10/12 67 kg (147 lb 9.9 oz) (87 %, Z= 1.15)*     * Growth percentiles are based on [facility] (Girls, 2-20 Years) data.           Assessment:      Assessment   1. Right upper quadrant abdominal pain    2. History of gastroesophageal reflux (GERD)    3. Eructation    4. Pharyngoesophageal dysphagia    5. Bloating symptom    6. Irregular bowel habits    7. Hematochezia    8. Nausea          Plan:      Plan   Right upper quadrant abdominal pain  -  START   pantoprazole (PROTONIX) 40 MG tablet; Take 1 tablet (40 mg total) by mouth before breakfast.  Dispense: 30 tablet; Refill: 1  - schedule EGD, discussed procedure with patient, including risks and benefits, patient verbalized understanding  - avoid/minimize use of NSAIDs- since they can cause GI upset, bleeding and/or ulcers. If NSAID must be taken, recommend take with food.     History of gastroesophageal reflux (GERD) & Eructation  - START    pantoprazole (PROTONIX) 40 MG tablet; Take 1 tablet (40 mg total) by mouth before breakfast.  Dispense: 30 tablet; Refill: 1  - schedule EGD, discussed procedure with patient, including risks and benefits, patient verbalized understanding     Pharyngoesophageal dysphagia  - schedule EGD, discussed procedure with patient and possible esophageal dilation may be performed during procedure if indicated, patient verbalized understanding  - educated patient to eat smaller more frequent meals and to eat slowly and advised to eat a soft diet.  - possible UGI/esophagram/esophageal manometry if symptoms persist     Bloating symptom  - recommend OTC simethicone as directed, such as Phazyme or Gas-x  - recommend low gas diet: Reduce or  eliminate these foods from your diet: Broccoli, Cauliflower, [place] sprouts, Cabbage, Cooked dried beans, Carbonated beverages (sparkling water, soda, beer, champagne)  Other Causes Of Excess Gas Include:   1) EATING TOO FAST or TALKING WHILE YOU CHEW may cause you to swallow air. This increases the amount of gas in the stomach and may worsen your symptoms.  --> Chew each mouthful completely before swallowing. Take your time.  2) OVEREATING may increase the feeling of being bloated and cause more gas.  --> When you are full, stop eating.  3) CONSTIPATION can increase the amount of normal intestinal gas.  --> Avoid constipation by increasing the amount of fiber in your diet by including whole cereal grains, fresh vegetables (except those in the above list) and fresh fruits. High-fiber foods absorb water and carry it out of the body. When increasing the amount of fiber in your diet, you also need to increase the amount of water that you drink. You should drink at least eight 8-ounce glasses of water (two quarts) per day.  - schedule EGD, discussed procedure with patient, including risks and benefits, patient verbalized understanding  - schedule Colonoscopy, discussed procedure with the patient, including risks and benefits, patient verbalized understanding     Irregular bowel habits  - schedule Colonoscopy, discussed procedure with the patient, including risks and benefits, patient verbalized understanding  - recommended increase fiber in diet, especially soluble fiber since this can help bulk up the stool consistency and may help to slow down how fast the stool goes through the colon and can prevent diarrhea; Recommend high fiber diet (20-30 grams of fiber daily)/OTC fiber supplements daily as directed, such as Benefiber or Metamucil.  - CAN CONTINUE BENTYL 20 MG BID PRN AS DIRECTED     Hematochezia  - schedule Colonoscopy, discussed procedure with the patient, including risks and benefits, patient verbalized  understanding  - discussed about different etiologies that can cause rectal bleeding, such as but not limited to diverticulosis, polyps, colon mass, colon inflammation or infection, anal fissure or hemorrhoids.   - You may resume normal activity as long as you feel well.  - Avoid/minimize NSAIDs such as ibuprofen (Advil, Motrin) and naproxen (Aleve and Naprosyn).  - Avoid alcohol.     Nausea  -  START   pantoprazole (PROTONIX) 40 MG tablet; Take 1 tablet (40 mg total) by mouth before breakfast.  Dispense: 30 tablet; Refill: 1  - schedule EGD, discussed procedure with patient, including risks and benefits, patient verbalized understanding  - CONTINUE ZOFRAN PRN AS DIRECTED FOR NAUSEA  - discussed with patient that certain medications, such as Ozempic, may be contributing to symptoms. I recommend follow-up with provider who manages medication to discuss about possible alternative therapy, patient verbalized understanding     Follow up in about 1 month (around 12/15/2023), or if symptoms worsen or fail to improve.               Then, was seen in [facility] ER 11/22/2023, with CT Scan:  Patient presents with  Abdominal Pain    History of Present IllnessAlyson Vila is a 28 y.o. female with history of PCOS, anxiety, depression, Hashimoto's disease and seasonal allergies presenting to the emergency department for evaluation of abdominal pain. Patient reports approximately 3 weeks ago she began with pain to the right upper quadrant of her abdomen. Reports she was seen by GI and had a ultrasound performed which was unremarkable. Reports she had labs drawn as well that were normal. Patient reports pain has now moved to the right lower quadrant of her abdomen. Reports pain does radiate to the left lower quadrant. Reports pain became severe today. She denies nausea, vomiting or diarrhea. Reports she did feel as though she may have had a fever earlier today. She denies vaginal bleeding or discharge. She denies urinary  complaints. She denies pregnancy.  The history is provided by the patient. No  was used.     CT Abdomen Pelvis W Contrast    Result Date: 11/22/2023  REASON FOR EXAM: LLQ abdominal pain / RLQ abdominal pain  FINDINGS:   Soft tissues/Musculature: Unremarkable   Osseous Structures: Unremarkable   Lung bases: Unremarkable   Aorta/Vasculature: Unremarkable   Lymph nodes/Mesentery: Unremarkable   Liver: Unremarkable   Gallbladder/Biliary System: Unremarkable   Pancreas: Unremarkable   Spleen: Unremarkable   Adrenal glands: Unremarkable   Kidneys/Ureters: Unremarkable   Urinary bladder: Unremarkable   Reproductive organs: Normal-sized ovaries.   Peritoneum: Unremarkable   Bowel: Normal appendix. Mild mucosal hyperenhancement.   IMPRESSION:   1. Mild sigmoid colon mucosal hyperenhancement. Nonspecific but can suggest colitis.   2. Normal appendix.   Electronically signed by Jason Nicholas MD on 11/22/2023       See the Colonoscopy 12/6/2023:  Impression:            - The anus is normal.                          - Non-bleeding internal hemorrhoids.                          - The rectum and recto-sigmoid colon are normal.                          - Sigmoid with mild colonic spasm consistent with                          irritable bowel syndrome.                          - The sigmoid colon is otherwise normal.                          - The entire examined colon is normal.                          - The hepatic flexure and cecum are normal.                          Biopsied.                          - The examined portion of the ileum was normal.                          Biopsied.   Recommendation:        - Discharge patient to home.                          - Await pathology results.                          - High fiber diet and low fat diet.                          - Use fiber, for example Citrucel, Fibercon,                          Konsyl or Metamucil.                          - Augmented water  consumption diet.                          - Take a PROBIOTIC, such as ALIGN or CULTURELLE or                          KIRAN-Q (all non-prescription), every day for a                          month.                          - Use Bentyl (dicyclomine) 10 mg PO QID 30 min AC.                          - Call the G.I. clinic in 2 weeks for reports (if                          you haven't heard from us sooner) [phone].                          - Repeat colonoscopy at age 46 for screening                          purposes.                          - Continue present medications.                          - Return to normal activities tomorrow.   Ken Diaz MD   12/6/2023          PTA Medications   Medication Sig    cetirizine (ZYRTEC) 10 MG tablet Take 1 tablet (10 mg total) by mouth once daily.    cycloSPORINE (RESTASIS) 0.05 % ophthalmic emulsion Place 1 drop into both eyes 2 (two) times daily.    desvenlafaxine succinate (PRISTIQ) 100 MG Tb24 Take 1 tablet (100 mg total) by mouth once daily.    dextroamphetamine-amphetamine (ADDERALL XR) 15 MG 24 hr capsule Take 1 capsule (15 mg total) by mouth every morning.    dicyclomine (BENTYL) 10 MG capsule Take 1 capsule (10 mg total) by mouth 4 (four) times daily before meals and nightly. , to ease colon spasms and diarrhea.    meloxicam (MOBIC) 15 MG tablet TAKE 1 TABLET(15 MG) BY MOUTH EVERY DAY    norethindrone-e.estradioL-iron (MINASTRIN 24 FE) 1 mg-20 mcg(24) /75 mg (4) Chew Take 1 tablet by mouth once daily.    ondansetron (ZOFRAN) 8 MG tablet TAKE 1 TABLET(8 MG) BY MOUTH EVERY 8 HOURS AS NEEDED    pantoprazole (PROTONIX) 40 MG tablet Take 1 tablet (40 mg total) by mouth before breakfast.    azelastine (ASTELIN) 137 mcg (0.1 %) nasal spray 1 spray (137 mcg total) by Nasal route 2 (two) times daily. for 7 days    semaglutide (OZEMPIC) 1 mg/dose (4 mg/3 mL) Inject 1 mg into the skin every 7 days.       Review of patient's allergies indicates:   Allergen Reactions  "   Grass pollen-sweet vernal grass, std. Itching and Tinitus    Ragweed Itching and Tinitus        Past Medical History:   Diagnosis Date    Insulin resistance     PCOS (polycystic ovarian syndrome)     Thyroid disease     Hashimoto's     Past Surgical History:   Procedure Laterality Date    COLONOSCOPY N/A 12/6/2023    Procedure: COLONOSCOPY;  Surgeon: Ken Diaz Jr., MD;  Location: [facility] ENDO;  Service: Endoscopy;  Laterality: N/A;    COLONOSCOPY N/A 12/4/2023    Procedure: COLONOSCOPY;  Surgeon: Ken Diaz Jr., MD;  Location: [facility] ENDO;  Service: Endoscopy;  Laterality: N/A;     Family History   Problem Relation Age of Onset    Alcohol abuse Mother     Cervical cancer Mother     Colon cancer Neg Hx     Crohn's disease Neg Hx     Esophageal cancer Neg Hx     Ulcerative colitis Neg Hx     Stomach cancer Neg Hx      Social History     Tobacco Use    Smoking status: Never   Substance Use Topics    Alcohol use: Yes     Comment: Socially only. Maybe 2x per month    Drug use: Never         OBJECTIVE:     Vital Signs (Most Recent)  Temp: 97.3 °F (36.3 °C) (01/24/24 0817)  Pulse: 93 (01/24/24 0817)  Resp: 17 (01/24/24 0817)  BP: 109/64 (01/24/24 0817)  SpO2: 100 % (01/24/24 0817)    Physical Exam:  : Ht: 5' 2" (157.5 cm)   Wt: 86.2 kg (190 lb)   BMI: 34.75 kg/m² .                                                       GENERAL:  Comfortable, in no acute distress.                                 HEENT EXAM:  Nonicteric.  No adenopathy.  Oropharynx is clear.               NECK:  Supple.                                                               LUNGS:  Clear.                                                               CARDIAC:  Regular rate and rhythm.  S1, S2.  No murmur.                      ABDOMEN:  Obese.  Soft, positive bowel sounds, nontender.  No hepatosplenomegaly or masses.  No rebound or guarding.                                             EXTREMITIES:  No edema.     MENTAL STATUS:  Alert and " oriented.    ASSESSMENT/PLAN:     Assessment: RUQ pain.  Hx of GERD.  Pharyngoesophageal dysphagia.  Bloating.     Plan: Gastroscopy    Anesthesia Plan:   MAC / General Anaesthesia    ASA Grade: ASA 2 - Patient with mild systemic disease with no functional limitations    MALLAMPATI SCORE: II (hard and soft palate, upper portion of tonsils anduvula visible)

## 2024-01-24 NOTE — DISCHARGE INSTRUCTIONS
Recovery After Procedural Sedation (Adult)   You have been given medicine by vein to make you sleep during your surgery. This may have included both a pain medicine and sleeping medicine. Most of the effects have worn off. But you may still have some drowsiness for the next 6 to 8 hours.  Home care  Follow these guidelines when you get home:  For the next 8 hours, you should be watched by a responsible adult. This person should make sure your condition is not getting worse.  Don't drink any alcohol for the next 24 hours.  Don't drive, operate dangerous machinery, or make important business or personal decisions during the next 24 hours.  To prevent injury or falls, use caution when standing and walking for at least 24 hours after your procedure.  Note: Your healthcare provider may tell you not to take any medicine by mouth for pain or sleep in the next 4 hours. These medicines may react with the medicines you were given in the hospital. This could cause a much stronger response than usual.  Follow-up care  Follow up with your healthcare provider if you are not alert and back to your usual level of activity within 12 hours.  When to seek medical advice  Call your healthcare provider right away if any of these occur:  Drowsiness gets worse  Weakness or dizziness gets worse  Repeated vomiting  You can't be awakened  Fever  New rash  [employer] last reviewed this educational content on 9/1/2019  © 9600-2160 [employer]. [address] [place] [address], [place] [address]. All rights reserved. This information is not intended as a substitute for professional medical care. Always follow your healthcare professional's instructions         Tips to Control Acid Reflux    To control acid reflux, youll need to make some basic diet and lifestyle changes. The simple steps outlined below may be all youll need to ease discomfort.  Watch what you eat  Avoid fatty foods and spicy foods.  Eat fewer acidic foods, such as citrus  and tomato-based foods. These can increase symptoms.  Limit drinking alcohol, caffeine, and fizzy beverages. All increase acid reflux.  Try limiting chocolate, peppermint, and spearmint. These can worsen acid reflux in some people.  Watch when you eat  Avoid lying down for 3 hours after eating.  Do not snack before going to bed.  Raise your head  Raising your head and upper body by 4 to 6 inches helps limit reflux when youre lying down. Put blocks under the head of your bed frame to raise it.  Other changes  Lose weight, if you need to  Dont exercise near bedtime  Avoid tight-fitting clothes  Limit aspirin and ibuprofen  Stop smoking   Date Last Reviewed: 7/1/2016  © 2975-0572 [employer]. [address] [place] [address], [place] [address]. All rights reserved. This information is not intended as a substitute for professional medical care. Always follow your healthcare professional's instructions.         High-Fiber Diet  Fiber is in fruits, vegetables, cereals, and grains. Fiber passes through your body undigested. A high-fiber diet helps food move through your intestinal tract. The added bulk is helpful in preventing constipation. In people with diverticulosis, fiber helps clean out the pouches along the colon wall. It also prevents new pouches from forming. A high-fiber diet reduces the risk of colon cancer. It also lowers blood cholesterol and prevents high blood sugar in people with diabetes.    The fiber-rich foods listed below should be part of your diet. If you are not used to high-fiber foods, start with 1 or 2 foods from this list. Every 3 to 4 days add a new one to your diet. Do this until you are eating 4 high-fiber foods per day. This should give you 20 to 35 grams of fiber a day. It is also important to drink a lot of water when you are on this diet. You should have 6 to 8 glasses of water a day. Water makes the fiber swell and increases the benefit.  Foods high in dietary fiber  The following  foods are high in dietary fiber:  Breads. Breads made with 100% whole-wheat flour; jenn, wheat, or rye crackers; whole-grain tortillas, bran muffins.  Cereals. Whole-grain and bran cereals with bran (shredded wheat, wheat flakes, raisin bran, corn bran); oatmeal, rolled oats, granola, and brown rice.  Fruits. Fresh fruits and their edible skins (pears, prunes, raisins, berries, apples, and apricots); bananas, citrus fruit, mangoes, pineapple; and prune juice.  Nuts. Any nuts and seeds.  Vegetables. Best served raw or lightly cooked. All types, especially: green peas, celery, eggplant, potatoes, spinach, broccoli, [place] sprouts, winter squash, carrots, cauliflower, soybeans, lentils, and fresh and dried beans of all kinds.  Other. Popcorn, any spices.  Date Last Reviewed: 8/1/2016  © 5345-3241 [employer]. [address] [place] [address]. All rights reserved. This information is not intended as a substitute for professional medical care. Always follow your healthcare professional's instructions.

## 2024-01-24 NOTE — BRIEF OP NOTE
Discharge Note  Short Stay      SUMMARY     Admit Date: 1/24/2024    Attending Physician: Ken Diaz Jr., MD     Discharge Physician: Ken Diaz Jr., MD    Discharge Date: 1/24/2024 9:38 AM    Final Diagnosis: Heartburn [R12]  Right upper quadrant pain [R10.11]  Bloating [R14.0]    Impression:            - Normal oropharynx.                          - Normal larynx.                          - Normal cricopharyngeus.                          - Normal upper third of esophagus and middle third                          of esophagus.                          - Erythema in the distal esophagus. Biopsied.                          - (Non-erosive esophageal reflux (NERD)                          disease(?)).                          - Z-line regular, 38 cm from the incisors.                          - Patulous lower esophageal sphincter.                          - Normal gastric fundus and gastric body.                          - Normal antrum and prepyloric region of the                          stomach. Biopsied.                          - Normal pylorus.                          - Normal examined duodenum. Biopsied.                          - Normal major papilla.   Recommendation:        - Discharge patient to home.                          - Await pathology results.                          - Follow an antireflux regimen.                          - Exercise and weight loss.                          - Continue present medications.                          - Use Protonix (pantoprazole) 40 mg PO daily.                          - Call the G.I. clinic in 2 weeks for reports (if                          you haven't heard from us sooner) [phone].                          - Return to GI clinic in 2-3 months.   Ken Diaz MD   1/24/2024       Disposition: HOME OR SELF CARE    Patient Instructions:   Current Discharge Medication List        CONTINUE these medications which have NOT CHANGED    Details    cetirizine (ZYRTEC) 10 MG tablet Take 1 tablet (10 mg total) by mouth once daily.  Qty: 90 tablet, Refills: 3      cycloSPORINE (RESTASIS) 0.05 % ophthalmic emulsion Place 1 drop into both eyes 2 (two) times daily.      desvenlafaxine succinate (PRISTIQ) 100 MG Tb24 Take 1 tablet (100 mg total) by mouth once daily.  Qty: 90 tablet, Refills: 3    Associated Diagnoses: GAL (generalized anxiety disorder); Mild episode of recurrent major depressive disorder      dextroamphetamine-amphetamine (ADDERALL XR) 15 MG 24 hr capsule Take 1 capsule (15 mg total) by mouth every morning.  Qty: 30 capsule, Refills: 0    Associated Diagnoses: Attention deficit disorder (ADD) without hyperactivity      dicyclomine (BENTYL) 10 MG capsule Take 1 capsule (10 mg total) by mouth 4 (four) times daily before meals and nightly. , to ease colon spasms and diarrhea.  Qty: 120 capsule, Refills: 11      meloxicam (MOBIC) 15 MG tablet TAKE 1 TABLET(15 MG) BY MOUTH EVERY DAY  Qty: 30 tablet, Refills: 2    Associated Diagnoses: Pain in both wrists      norethindrone-e.estradioL-iron (MINASTRIN 24 FE) 1 mg-20 mcg(24) /75 mg (4) Chew Take 1 tablet by mouth once daily.      ondansetron (ZOFRAN) 8 MG tablet TAKE 1 TABLET(8 MG) BY MOUTH EVERY 8 HOURS AS NEEDED  Qty: 15 tablet, Refills: 0      pantoprazole (PROTONIX) 40 MG tablet Take 1 tablet (40 mg total) by mouth before breakfast.  Qty: 30 tablet, Refills: 1    Associated Diagnoses: Right upper quadrant abdominal pain; History of gastroesophageal reflux (GERD); Eructation; Nausea      azelastine (ASTELIN) 137 mcg (0.1 %) nasal spray 1 spray (137 mcg total) by Nasal route 2 (two) times daily. for 7 days  Qty: 30 mL, Refills: 0      semaglutide (OZEMPIC) 1 mg/dose (4 mg/3 mL) Inject 1 mg into the skin every 7 days.  Qty: 3 mL, Refills: 11             Discharge Procedure Orders (must include Diet, Follow-up, Activity)    Follow Up:  Follow up with PCP as per your routine.  Please follow an anti reflux  diet and a high fiber diet.  Activity as tolerated.    No driving day of procedure.

## 2024-01-24 NOTE — ANESTHESIA PREPROCEDURE EVALUATION
01/24/2024  Alyson Vila is a 28 y.o., female.      Pre-op Assessment    I have reviewed the Patient Summary Reports.     I have reviewed the Nursing Notes. I have reviewed the NPO Status.   I have reviewed the Medications.     Review of Systems  Anesthesia Hx:  No problems with previous Anesthesia             Denies Family Hx of Anesthesia complications.    Denies Personal Hx of Anesthesia complications.                    Cardiovascular:  Cardiovascular Normal                                            Pulmonary:  Pulmonary Normal                       OB/GYN/PEDS:  PCOS           Endocrine:        Obesity / BMI > 30  Psych:   anxiety depression                Physical Exam  General: Cooperative, Alert and Oriented    Airway:  Mallampati: II   Mouth Opening: Normal  TM Distance: Normal  Tongue: Normal  Neck ROM: Normal ROM  Neck: Girth Increased        Anesthesia Plan  Type of Anesthesia, risks & benefits discussed:    Anesthesia Type: Gen Natural Airway  Intra-op Monitoring Plan: Standard ASA Monitors  Induction:  IV  Informed Consent: Informed consent signed with the Patient and all parties understand the risks and agree with anesthesia plan.  All questions answered.   ASA Score: 2    Ready For Surgery From Anesthesia Perspective.     .

## 2024-01-26 ENCOUNTER — TELEPHONE (OUTPATIENT)
Dept: GASTROENTEROLOGY | Facility: CLINIC | Age: 29
End: 2024-01-26
Payer: COMMERCIAL

## 2024-01-26 ENCOUNTER — PATIENT MESSAGE (OUTPATIENT)
Dept: GASTROENTEROLOGY | Facility: CLINIC | Age: 29
End: 2024-01-26
Payer: COMMERCIAL

## 2024-01-29 LAB
FINAL PATHOLOGIC DIAGNOSIS: NORMAL
GROSS: NORMAL
Lab: NORMAL
SUPPLEMENTAL DIAGNOSIS: NORMAL

## 2024-02-08 ENCOUNTER — OFFICE VISIT (OUTPATIENT)
Dept: FAMILY MEDICINE | Facility: CLINIC | Age: 29
End: 2024-02-08
Payer: COMMERCIAL

## 2024-02-08 ENCOUNTER — PATIENT MESSAGE (OUTPATIENT)
Dept: PRIMARY CARE CLINIC | Facility: CLINIC | Age: 29
End: 2024-02-08
Payer: COMMERCIAL

## 2024-02-08 VITALS
SYSTOLIC BLOOD PRESSURE: 119 MMHG | RESPIRATION RATE: 18 BRPM | HEIGHT: 62 IN | HEART RATE: 89 BPM | DIASTOLIC BLOOD PRESSURE: 82 MMHG | BODY MASS INDEX: 35.95 KG/M2 | WEIGHT: 195.38 LBS

## 2024-02-08 DIAGNOSIS — R21 RASH AND NONSPECIFIC SKIN ERUPTION: Primary | ICD-10-CM

## 2024-02-08 PROCEDURE — 1160F RVW MEDS BY RX/DR IN RCRD: CPT | Mod: CPTII,S$GLB,, | Performed by: PHYSICIAN ASSISTANT

## 2024-02-08 PROCEDURE — 3008F BODY MASS INDEX DOCD: CPT | Mod: CPTII,S$GLB,, | Performed by: PHYSICIAN ASSISTANT

## 2024-02-08 PROCEDURE — 1159F MED LIST DOCD IN RCRD: CPT | Mod: CPTII,S$GLB,, | Performed by: PHYSICIAN ASSISTANT

## 2024-02-08 PROCEDURE — 3074F SYST BP LT 130 MM HG: CPT | Mod: CPTII,S$GLB,, | Performed by: PHYSICIAN ASSISTANT

## 2024-02-08 PROCEDURE — 99999 PR PBB SHADOW E&M-EST. PATIENT-LVL V: CPT | Mod: PBBFAC,,, | Performed by: PHYSICIAN ASSISTANT

## 2024-02-08 PROCEDURE — 96372 THER/PROPH/DIAG INJ SC/IM: CPT | Mod: S$GLB,,, | Performed by: PHYSICIAN ASSISTANT

## 2024-02-08 PROCEDURE — 99213 OFFICE O/P EST LOW 20 MIN: CPT | Mod: 25,S$GLB,, | Performed by: PHYSICIAN ASSISTANT

## 2024-02-08 PROCEDURE — 3079F DIAST BP 80-89 MM HG: CPT | Mod: CPTII,S$GLB,, | Performed by: PHYSICIAN ASSISTANT

## 2024-02-08 RX ORDER — DEXAMETHASONE SODIUM PHOSPHATE 4 MG/ML
8 INJECTION, SOLUTION INTRA-ARTICULAR; INTRALESIONAL; INTRAMUSCULAR; INTRAVENOUS; SOFT TISSUE ONCE
Status: COMPLETED | OUTPATIENT
Start: 2024-02-08 | End: 2024-02-08

## 2024-02-08 RX ORDER — TRIAMCINOLONE ACETONIDE 1 MG/G
CREAM TOPICAL 2 TIMES DAILY
Qty: 453.6 G | Refills: 0 | Status: SHIPPED | OUTPATIENT
Start: 2024-02-08 | End: 2025-02-07

## 2024-02-08 RX ORDER — PREDNISONE 20 MG/1
40 TABLET ORAL DAILY
Qty: 10 TABLET | Refills: 0 | Status: SHIPPED | OUTPATIENT
Start: 2024-02-08 | End: 2024-02-13

## 2024-02-08 RX ORDER — HYDROXYZINE PAMOATE 25 MG/1
25 CAPSULE ORAL EVERY 8 HOURS PRN
Qty: 30 CAPSULE | Refills: 0 | Status: SHIPPED | OUTPATIENT
Start: 2024-02-08

## 2024-02-08 RX ADMIN — DEXAMETHASONE SODIUM PHOSPHATE 8 MG: 4 INJECTION, SOLUTION INTRA-ARTICULAR; INTRALESIONAL; INTRAMUSCULAR; INTRAVENOUS; SOFT TISSUE at 09:02

## 2024-02-08 NOTE — PATIENT INSTRUCTIONS
Alvaro Shields,     If you are due for any health screening(s) below please notify me so we can arrange them to be ordered and scheduled. Most healthy patients at your age complete them, but you are free to accept or refuse.     If you can't do it, I'll definitely understand. If you can, I'd certainly appreciate it!    All of your core healthy metrics are met.

## 2024-02-08 NOTE — PROGRESS NOTES
Pt tolerated injection well. Pt  advised to wait 15 minutes after injection to observe for reaction . Pt in NAD upon discharge.      Electrodesiccation Text: The wound bed was treated with electrodesiccation after the biopsy was performed.

## 2024-02-08 NOTE — PROGRESS NOTES
Assessment/Plan:    Problem List Items Addressed This Visit    None  Visit Diagnoses       Rash and nonspecific skin eruption    -  Primary    Relevant Medications    dexAMETHasone injection 8 mg (Start on 2/8/2024  9:45 AM)    predniSONE (DELTASONE) 20 MG tablet    triamcinolone acetonide 0.1% (KENALOG) 0.1 % cream    hydrOXYzine pamoate (VISTARIL) 25 MG Cap        -decadron IM today  -start topical and oral steroids as prescribed   -PRN vistaril for itching  -avoid touching/scratching  -follow up if no improvement in symptoms  -ER precautions for severe or worsening of symptoms    Follow up if symptoms worsen or fail to improve.    Brittani Urena PA-C  _____________________________________________________________________________________________________________________________________________________    CC: rash    HPI: Patient is in clinic today as an established patient here for rash located on her arms, legs and neck. Rash started >1 week ago, and has worsened since that time. Appearance of rash at onset: erythematous welts located on her hands and feet which eventually spread to her arms, legs and neck. She has associated pruritus. Denies fever, pain, congestion, sore throat, coughing, abdominal pain, nausea, vomiting, headaches or arthralgias. She has tried Benadryl, Zyrtec, and Cortisone cream with minimal improvement of symptoms. She has not had contacts with similar rash. She has not identified precipitant. 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 She also has chronic allergies (grass, pollen, etc) and Hashimoto's disease. She denies any other recent new exposures (soaps, lotions, laundry detergents, foods, medications, plants, insects or animals). No other complaints today.     Past Medical History:   Diagnosis Date    Insulin resistance     PCOS (polycystic ovarian syndrome)     Thyroid disease      Hashimoto's     Past Surgical History:   Procedure Laterality Date    COLONOSCOPY N/A 12/6/2023    Procedure: COLONOSCOPY;  Surgeon: Ken Diaz Jr., MD;  Location: [facility] ENDO;  Service: Endoscopy;  Laterality: N/A;    COLONOSCOPY N/A 12/4/2023    Procedure: COLONOSCOPY;  Surgeon: Ken Diaz Jr., MD;  Location: [facility] ENDO;  Service: Endoscopy;  Laterality: N/A;    ESOPHAGOGASTRODUODENOSCOPY N/A 1/24/2024    Procedure: EGD (ESOPHAGOGASTRODUODENOSCOPY);  Surgeon: Ken Diaz Jr., MD;  Location: [facility] ENDO;  Service: Endoscopy;  Laterality: N/A;     Review of patient's allergies indicates:   Allergen Reactions    Grass pollen-sweet vernal grass, std. Itching and Tinitus    Ragweed Itching and Tinitus     Social History     Tobacco Use    Smoking status: Never   Substance Use Topics    Alcohol use: Yes     Comment: Socially only. Maybe 2x per month    Drug use: Never     Family History   Problem Relation Age of Onset    Alcohol abuse Mother     Cervical cancer Mother     Colon cancer Neg Hx     Crohn's disease Neg Hx     Esophageal cancer Neg Hx     Ulcerative colitis Neg Hx     Stomach cancer Neg Hx      Current Outpatient Medications on File Prior to Visit   Medication Sig Dispense Refill    cetirizine (ZYRTEC) 10 MG tablet Take 1 tablet (10 mg total) by mouth once daily. 90 tablet 3    cycloSPORINE (RESTASIS) 0.05 % ophthalmic emulsion Place 1 drop into both eyes 2 (two) times daily.      desvenlafaxine succinate (PRISTIQ) 100 MG Tb24 Take 1 tablet (100 mg total) by mouth once daily. 90 tablet 3    dextroamphetamine-amphetamine (ADDERALL XR) 15 MG 24 hr capsule Take 1 capsule (15 mg total) by mouth every morning. 30 capsule 0    dicyclomine (BENTYL) 10 MG capsule Take 1 capsule (10 mg total) by mouth 4 (four) times daily before meals and nightly. , to ease colon spasms and diarrhea. 120 capsule 11    meloxicam (MOBIC) 15 MG tablet TAKE 1 TABLET(15 MG) BY MOUTH EVERY DAY 30 tablet 2     "norethindrone-e.estradioL-iron (MINASTRIN 24 FE) 1 mg-20 mcg(24) /75 mg (4) Chew Take 1 tablet by mouth once daily.      ondansetron (ZOFRAN) 8 MG tablet TAKE 1 TABLET(8 MG) BY MOUTH EVERY 8 HOURS AS NEEDED 15 tablet 0    pantoprazole (PROTONIX) 40 MG tablet Take 1 tablet (40 mg total) by mouth before breakfast. 30 tablet 1    semaglutide (OZEMPIC) 1 mg/dose (4 mg/3 mL) Inject 1 mg into the skin every 7 days. 3 mL 11    azelastine (ASTELIN) 137 mcg (0.1 %) nasal spray 1 spray (137 mcg total) by Nasal route 2 (two) times daily. for 7 days 30 mL 0     No current facility-administered medications on file prior to visit.       Review of Systems   Constitutional:  Negative for chills, diaphoresis, fatigue and fever.   HENT:  Positive for rhinorrhea. Negative for congestion, ear pain, postnasal drip, sinus pain and sore throat.    Eyes:  Negative for pain and redness.   Respiratory:  Negative for cough, chest tightness and shortness of breath.    Cardiovascular:  Negative for chest pain and leg swelling.   Gastrointestinal:  Negative for abdominal pain, constipation, diarrhea, nausea and vomiting.   Genitourinary:  Negative for dysuria and hematuria.   Musculoskeletal:  Negative for arthralgias and joint swelling.   Skin:  Positive for rash.   Neurological:  Negative for dizziness, syncope and headaches.   Psychiatric/Behavioral:  Negative for dysphoric mood. The patient is not nervous/anxious.        Vitals:    02/08/24 0929   BP: 119/82   BP Location: Right arm   Patient Position: Sitting   Pulse: 89   Resp: 18   Weight: 88.6 kg (195 lb 6.4 oz)   Height: 5' 2" (1.575 m)       Wt Readings from Last 3 Encounters:   02/08/24 88.6 kg (195 lb 6.4 oz)   01/19/24 86.2 kg (190 lb)   12/05/23 89.8 kg (198 lb)       Physical Exam  Constitutional:       General: She is not in acute distress.     Appearance: Normal appearance. She is well-developed.   HENT:      Head: Normocephalic and atraumatic.   Eyes:      Conjunctiva/sclera: " Conjunctivae normal.   Cardiovascular:      Rate and Rhythm: Normal rate and regular rhythm.      Pulses: Normal pulses.      Heart sounds: Normal heart sounds. No murmur heard.  Pulmonary:      Effort: Pulmonary effort is normal. No respiratory distress.      Breath sounds: Normal breath sounds.   Abdominal:      General: Bowel sounds are normal. There is no distension.      Palpations: Abdomen is soft.      Tenderness: There is no abdominal tenderness.   Musculoskeletal:         General: Normal range of motion.      Cervical back: Normal range of motion and neck supple.   Skin:     General: Skin is warm and dry.      Findings: Rash present.      Comments: Erythematous welts located on arms, legs, and neck   Neurological:      General: No focal deficit present.      Mental Status: She is alert and oriented to person, place, and time.   Psychiatric:         Mood and Affect: Mood normal.         Behavior: Behavior normal.         Health Maintenance   Topic Date Due    Pap Smear  04/21/2026    TETANUS VACCINE  06/07/2031    Hepatitis C Screening  Completed    Lipid Panel  Completed

## 2024-02-13 ENCOUNTER — PATIENT MESSAGE (OUTPATIENT)
Dept: FAMILY MEDICINE | Facility: CLINIC | Age: 29
End: 2024-02-13
Payer: COMMERCIAL

## 2024-02-13 DIAGNOSIS — R21 RASH AND NONSPECIFIC SKIN ERUPTION: Primary | ICD-10-CM

## 2024-02-13 NOTE — TELEPHONE ENCOUNTER
I have signed for the following orders AND/OR meds.  Please call the patient and ask the patient to schedule the testing AND/OR inform about any medications that were sent. Medications have been sent to pharmacy listed below      Orders Placed This Encounter   Procedures    Ambulatory referral/consult to Allergy     Standing Status:   Future     Standing Expiration Date:   3/13/2025     Referral Priority:   Routine     Referral Type:   Allergy Testing     Referral Reason:   Specialty Services Required     Requested Specialty:   Allergy     Number of Visits Requested:   1              [id number] - [place] - [address]  [place]  [address] [address]  Phone: [phone] Fax: [phone]    [place], [place] - [address]  [address] [address]  Phone: [phone] Fax: [phone]    [id number] - [place] - [address]  [address]  [place] [address]  Phone: [phone] Fax: [phone]

## 2024-02-18 DIAGNOSIS — F98.8 ATTENTION DEFICIT DISORDER (ADD) WITHOUT HYPERACTIVITY: ICD-10-CM

## 2024-02-19 RX ORDER — DEXTROAMPHETAMINE SACCHARATE, AMPHETAMINE ASPARTATE MONOHYDRATE, DEXTROAMPHETAMINE SULFATE AND AMPHETAMINE SULFATE 3.75; 3.75; 3.75; 3.75 MG/1; MG/1; MG/1; MG/1
15 CAPSULE, EXTENDED RELEASE ORAL EVERY MORNING
Qty: 30 CAPSULE | Refills: 0 | OUTPATIENT
Start: 2024-02-19 | End: 2024-03-20

## 2024-02-19 NOTE — TELEPHONE ENCOUNTER
Refill Routing Note   Medication(s) are not appropriate for processing by [facility] for the following reason(s):        Outside of protocol    [facility] action(s):  Route               Appointments  past 12m or future 3m with PCP    Date Provider   Last Visit   11/7/2023 Irasema Jaquez MD   Next Visit   Visit date not found Irasema Jaquez MD   ED visits in past 90 days: 0        Note composed:7:14 AM 02/19/2024

## 2024-02-20 ENCOUNTER — PATIENT MESSAGE (OUTPATIENT)
Dept: FAMILY MEDICINE | Facility: CLINIC | Age: 29
End: 2024-02-20
Payer: COMMERCIAL

## 2024-02-20 RX ORDER — SEMAGLUTIDE 1.34 MG/ML
1 INJECTION, SOLUTION SUBCUTANEOUS
Qty: 3 ML | Refills: 4 | Status: SHIPPED | OUTPATIENT
Start: 2024-02-20 | End: 2024-03-27

## 2024-02-21 ENCOUNTER — OFFICE VISIT (OUTPATIENT)
Dept: ALLERGY | Facility: CLINIC | Age: 29
End: 2024-02-21
Payer: COMMERCIAL

## 2024-02-21 ENCOUNTER — LAB VISIT (OUTPATIENT)
Dept: LAB | Facility: HOSPITAL | Age: 29
End: 2024-02-21
Attending: ALLERGY & IMMUNOLOGY
Payer: COMMERCIAL

## 2024-02-21 VITALS
DIASTOLIC BLOOD PRESSURE: 76 MMHG | WEIGHT: 195.69 LBS | OXYGEN SATURATION: 100 % | SYSTOLIC BLOOD PRESSURE: 122 MMHG | BODY MASS INDEX: 36.01 KG/M2 | HEART RATE: 93 BPM | HEIGHT: 62 IN

## 2024-02-21 DIAGNOSIS — H10.13 ALLERGIC CONJUNCTIVITIS, BILATERAL: ICD-10-CM

## 2024-02-21 DIAGNOSIS — J30.9 CHRONIC ALLERGIC RHINITIS: ICD-10-CM

## 2024-02-21 DIAGNOSIS — L50.8 CHRONIC URTICARIA: ICD-10-CM

## 2024-02-21 DIAGNOSIS — J30.9 CHRONIC ALLERGIC RHINITIS: Primary | ICD-10-CM

## 2024-02-21 PROCEDURE — 3078F DIAST BP <80 MM HG: CPT | Mod: CPTII,S$GLB,, | Performed by: ALLERGY & IMMUNOLOGY

## 2024-02-21 PROCEDURE — 3008F BODY MASS INDEX DOCD: CPT | Mod: CPTII,S$GLB,, | Performed by: ALLERGY & IMMUNOLOGY

## 2024-02-21 PROCEDURE — 99999 PR PBB SHADOW E&M-EST. PATIENT-LVL IV: CPT | Mod: PBBFAC,,, | Performed by: ALLERGY & IMMUNOLOGY

## 2024-02-21 PROCEDURE — 1159F MED LIST DOCD IN RCRD: CPT | Mod: CPTII,S$GLB,, | Performed by: ALLERGY & IMMUNOLOGY

## 2024-02-21 PROCEDURE — 1160F RVW MEDS BY RX/DR IN RCRD: CPT | Mod: CPTII,S$GLB,, | Performed by: ALLERGY & IMMUNOLOGY

## 2024-02-21 PROCEDURE — 3074F SYST BP LT 130 MM HG: CPT | Mod: CPTII,S$GLB,, | Performed by: ALLERGY & IMMUNOLOGY

## 2024-02-21 PROCEDURE — 36415 COLL VENOUS BLD VENIPUNCTURE: CPT | Mod: PO | Performed by: ALLERGY & IMMUNOLOGY

## 2024-02-21 PROCEDURE — 86003 ALLG SPEC IGE CRUDE XTRC EA: CPT | Mod: 59 | Performed by: ALLERGY & IMMUNOLOGY

## 2024-02-21 PROCEDURE — 86003 ALLG SPEC IGE CRUDE XTRC EA: CPT | Performed by: ALLERGY & IMMUNOLOGY

## 2024-02-21 PROCEDURE — 99204 OFFICE O/P NEW MOD 45 MIN: CPT | Mod: S$GLB,,, | Performed by: ALLERGY & IMMUNOLOGY

## 2024-02-21 NOTE — PROGRESS NOTES
Subjective:       Patient ID: Alyson Vila is a 28 y.o. female.    Chief Complaint:  Urticaria      27 yo woman presents for consult from SOREN Urena for hives. She states 1/29 she broke out in hives- red raised and very itchy. Would come and go but had every day for 2 weeks and getting worse. She did see a pig at vet clinic and thought that may be cause but continued to get worse. 2/8 was seen by UC and given steroid shot and oral steroid which helped some but still with itch and few hives. Now seem to be gone. Was worse at her apartment so she deep cleaned and not sure if that helped. She does live and work with cats and now wonders if is trigger. Flu was going around work and she did have right before hives. She has rhinitis with sneeze, runny nose, congestion, itchy watery eye so takes zyrtec daily. Was on shots in past for about 18 months and helped. She is allergic to grass and ragweed. No asthma. No eczema. No food, insect or latex allergy. She has Hashimoto thyroiditis and insulin resistance.         Environmental History: see history section for home environment  Review of Systems   Constitutional:  Negative for chills and fever.   HENT:  Positive for congestion, postnasal drip, rhinorrhea and sneezing. Negative for ear pain, facial swelling, nosebleeds and sinus pressure.    Eyes:  Positive for discharge and itching. Negative for redness.   Respiratory:  Negative for cough, chest tightness, shortness of breath and wheezing.    Skin:  Positive for color change and rash.        Objective:      Physical Exam  Vitals and nursing note reviewed.   Constitutional:       General: She is not in acute distress.     Appearance: Normal appearance. She is not ill-appearing.   HENT:      Nose: No rhinorrhea.   Eyes:      General:         Right eye: No discharge.         Left eye: No discharge.      Conjunctiva/sclera: Conjunctivae normal.   Pulmonary:      Effort: Pulmonary effort is normal. No respiratory  distress.   Abdominal:      General: There is no distension.   Skin:     General: Skin is warm and dry.      Findings: No erythema or rash.   Neurological:      Mental Status: She is alert and oriented to person, place, and time.   Psychiatric:         Mood and Affect: Mood normal.         Behavior: Behavior normal.         Laboratory:   none performed   Assessment:       1. Chronic allergic rhinitis    2. Chronic urticaria    3. Allergic conjunctivitis, bilateral         Plan:       Urticaria- advised can be allergic vs immune mediated, suspect latter, discussed in detail. As resolved now no further med needed  Rhinitis- immunocaps to eval allergic triggers, continue cetirizine 10 mg daily and can increase to BID as needed  Phone review, if allergic may want to consider IT  PA Ayanna notified of completed consult via Epic    I spent a total of 45 minutes on the day of the visit.  This includes face to face time and non-face to face time preparing to see the patient (eg, review of tests), obtaining and/or reviewing separately obtained history, documenting clinical information in the electronic or other health record, independently interpreting results and communicating results to the patient/family/caregiver, or care coordinator.

## 2024-02-22 ENCOUNTER — OFFICE VISIT (OUTPATIENT)
Dept: FAMILY MEDICINE | Facility: CLINIC | Age: 29
End: 2024-02-22
Payer: COMMERCIAL

## 2024-02-22 VITALS
DIASTOLIC BLOOD PRESSURE: 76 MMHG | BODY MASS INDEX: 36.37 KG/M2 | HEART RATE: 88 BPM | HEIGHT: 62 IN | SYSTOLIC BLOOD PRESSURE: 115 MMHG | WEIGHT: 197.63 LBS | RESPIRATION RATE: 18 BRPM

## 2024-02-22 DIAGNOSIS — F98.8 ATTENTION DEFICIT DISORDER (ADD) WITHOUT HYPERACTIVITY: Primary | ICD-10-CM

## 2024-02-22 DIAGNOSIS — Z87.19 HISTORY OF GASTROESOPHAGEAL REFLUX (GERD): ICD-10-CM

## 2024-02-22 DIAGNOSIS — R11.0 NAUSEA: ICD-10-CM

## 2024-02-22 DIAGNOSIS — R14.2 ERUCTATION: ICD-10-CM

## 2024-02-22 DIAGNOSIS — R10.11 RIGHT UPPER QUADRANT ABDOMINAL PAIN: ICD-10-CM

## 2024-02-22 PROCEDURE — 3008F BODY MASS INDEX DOCD: CPT | Mod: CPTII,S$GLB,, | Performed by: PHYSICIAN ASSISTANT

## 2024-02-22 PROCEDURE — 1160F RVW MEDS BY RX/DR IN RCRD: CPT | Mod: CPTII,S$GLB,, | Performed by: PHYSICIAN ASSISTANT

## 2024-02-22 PROCEDURE — 99213 OFFICE O/P EST LOW 20 MIN: CPT | Mod: S$GLB,,, | Performed by: PHYSICIAN ASSISTANT

## 2024-02-22 PROCEDURE — 99999 PR PBB SHADOW E&M-EST. PATIENT-LVL IV: CPT | Mod: PBBFAC,,, | Performed by: PHYSICIAN ASSISTANT

## 2024-02-22 PROCEDURE — 3074F SYST BP LT 130 MM HG: CPT | Mod: CPTII,S$GLB,, | Performed by: PHYSICIAN ASSISTANT

## 2024-02-22 PROCEDURE — 3078F DIAST BP <80 MM HG: CPT | Mod: CPTII,S$GLB,, | Performed by: PHYSICIAN ASSISTANT

## 2024-02-22 PROCEDURE — 1159F MED LIST DOCD IN RCRD: CPT | Mod: CPTII,S$GLB,, | Performed by: PHYSICIAN ASSISTANT

## 2024-02-22 RX ORDER — DEXTROAMPHETAMINE SACCHARATE, AMPHETAMINE ASPARTATE MONOHYDRATE, DEXTROAMPHETAMINE SULFATE AND AMPHETAMINE SULFATE 3.75; 3.75; 3.75; 3.75 MG/1; MG/1; MG/1; MG/1
15 CAPSULE, EXTENDED RELEASE ORAL EVERY MORNING
Qty: 30 CAPSULE | Refills: 0 | Status: SHIPPED | OUTPATIENT
Start: 2024-02-22 | End: 2024-03-25 | Stop reason: SDUPTHER

## 2024-02-22 RX ORDER — DEXTROAMPHETAMINE SACCHARATE, AMPHETAMINE ASPARTATE MONOHYDRATE, DEXTROAMPHETAMINE SULFATE AND AMPHETAMINE SULFATE 3.75; 3.75; 3.75; 3.75 MG/1; MG/1; MG/1; MG/1
15 CAPSULE, EXTENDED RELEASE ORAL EVERY MORNING
Qty: 30 CAPSULE | Refills: 0 | Status: SHIPPED | OUTPATIENT
Start: 2024-04-22 | End: 2024-03-27

## 2024-02-22 RX ORDER — DEXTROAMPHETAMINE SACCHARATE, AMPHETAMINE ASPARTATE MONOHYDRATE, DEXTROAMPHETAMINE SULFATE AND AMPHETAMINE SULFATE 3.75; 3.75; 3.75; 3.75 MG/1; MG/1; MG/1; MG/1
15 CAPSULE, EXTENDED RELEASE ORAL EVERY MORNING
Qty: 30 CAPSULE | Refills: 0 | Status: SHIPPED | OUTPATIENT
Start: 2024-03-23 | End: 2024-03-27

## 2024-02-22 NOTE — PROGRESS NOTES
Assessment/Plan:    Problem List Items Addressed This Visit          Psychiatric    Attention deficit disorder (ADD) without hyperactivity - Primary    Overview     -therapist has conducted testing suggesting diagnosis of ADD  -now that depression/anxiety treatment established and stable, recently started on treatment for ADD  -remains on adderall XR to 15 mg with improvement in symptoms  -denies any AE with the medication thus far  -plan to continue   -The patient was checked in the [facility] Board of Pharmacy's  Prescription Monitoring Program. There appears to be no incongruities with the patient's verbalized history.           Relevant Medications    dextroamphetamine-amphetamine (ADDERALL XR) 15 MG 24 hr capsule    dextroamphetamine-amphetamine (ADDERALL XR) 15 MG 24 hr capsule (Start on 3/23/2024)    dextroamphetamine-amphetamine (ADDERALL XR) 15 MG 24 hr capsule (Start on 4/22/2024)       Follow up in about 3 months (around 5/22/2024), or if symptoms worsen or fail to improve, for Virtual Visit.    Brittani Urena PA-C  _____________________________________________________________________________________________________________________________________________________    CC: Attention Deficit Disorder    HPI: Patient is in clinic today as an established patient here to discuss the chronic use of attention deficit medications. The patient has been on the medicine for the last 4 months and has been stable on the current dose of medicine during that time. The patient has been compliant on the medicine and is not receiving narcotics from any other physician. The  for the State of [place] has been viewed. The patient denies any complications from taking the medicine. The patient's weight and appetite has been stable and has not had difficulties with agitation. The patient reports improvement in the symptoms with the current dose. No other complaints today.     Past Medical History:   Diagnosis Date    Insulin  resistance     PCOS (polycystic ovarian syndrome)     Thyroid disease     Hashimoto's     Past Surgical History:   Procedure Laterality Date    COLONOSCOPY N/A 12/6/2023    Procedure: COLONOSCOPY;  Surgeon: Ken Diaz Jr., MD;  Location: [facility] ENDO;  Service: Endoscopy;  Laterality: N/A;    COLONOSCOPY N/A 12/4/2023    Procedure: COLONOSCOPY;  Surgeon: Ken Diaz Jr., MD;  Location: [facility] ENDO;  Service: Endoscopy;  Laterality: N/A;    ESOPHAGOGASTRODUODENOSCOPY N/A 1/24/2024    Procedure: EGD (ESOPHAGOGASTRODUODENOSCOPY);  Surgeon: Ken Diaz Jr., MD;  Location: [facility] ENDO;  Service: Endoscopy;  Laterality: N/A;     Review of patient's allergies indicates:   Allergen Reactions    Grass pollen-sweet vernal grass, std. Itching and Tinitus    Ragweed Itching and Tinitus     Social History     Tobacco Use    Smoking status: Never   Substance Use Topics    Alcohol use: Yes     Comment: Socially only. Maybe 2x per month    Drug use: Never     Family History   Problem Relation Age of Onset    Alcohol abuse Mother     Cervical cancer Mother     Colon cancer Neg Hx     Crohn's disease Neg Hx     Esophageal cancer Neg Hx     Ulcerative colitis Neg Hx     Stomach cancer Neg Hx      Current Outpatient Medications on File Prior to Visit   Medication Sig Dispense Refill    cetirizine (ZYRTEC) 10 MG tablet Take 1 tablet (10 mg total) by mouth once daily. 90 tablet 3    cycloSPORINE (RESTASIS) 0.05 % ophthalmic emulsion Place 1 drop into both eyes 2 (two) times daily.      desvenlafaxine succinate (PRISTIQ) 100 MG Tb24 Take 1 tablet (100 mg total) by mouth once daily. 90 tablet 3    dicyclomine (BENTYL) 10 MG capsule Take 1 capsule (10 mg total) by mouth 4 (four) times daily before meals and nightly. , to ease colon spasms and diarrhea. 120 capsule 11    hydrOXYzine pamoate (VISTARIL) 25 MG Cap Take 1 capsule (25 mg total) by mouth every 8 (eight) hours as needed (itching). 30 capsule 0    meloxicam (MOBIC) 15 MG  "tablet TAKE 1 TABLET(15 MG) BY MOUTH EVERY DAY 30 tablet 2    norethindrone-e.estradioL-iron (MINASTRIN 24 FE) 1 mg-20 mcg(24) /75 mg (4) Chew Take 1 tablet by mouth once daily.      ondansetron (ZOFRAN) 8 MG tablet TAKE 1 TABLET(8 MG) BY MOUTH EVERY 8 HOURS AS NEEDED 15 tablet 0    pantoprazole (PROTONIX) 40 MG tablet Take 1 tablet (40 mg total) by mouth before breakfast. 30 tablet 1    semaglutide (OZEMPIC) 1 mg/dose (4 mg/3 mL) Inject 1 mg into the skin every 7 days. 3 mL 4    triamcinolone acetonide 0.1% (KENALOG) 0.1 % cream Apply topically 2 (two) times daily. 453.6 g 0    azelastine (ASTELIN) 137 mcg (0.1 %) nasal spray 1 spray (137 mcg total) by Nasal route 2 (two) times daily. for 7 days 30 mL 0     No current facility-administered medications on file prior to visit.       Review of Systems   Constitutional:  Negative for chills, diaphoresis, fatigue and fever.   HENT:  Negative for congestion, ear pain, postnasal drip, sinus pain and sore throat.    Eyes:  Negative for pain and redness.   Respiratory:  Negative for cough, chest tightness and shortness of breath.    Cardiovascular:  Negative for chest pain and leg swelling.   Gastrointestinal:  Negative for abdominal pain, constipation, diarrhea, nausea and vomiting.   Genitourinary:  Negative for dysuria and hematuria.   Musculoskeletal:  Negative for arthralgias and joint swelling.   Skin:  Negative for rash.   Neurological:  Negative for dizziness, syncope and headaches.   Psychiatric/Behavioral:  Negative for dysphoric mood. The patient is not nervous/anxious.        Vitals:    02/22/24 0851   BP: 115/76   BP Location: Right arm   Patient Position: Sitting   Pulse: 88   Resp: 18   Weight: 89.6 kg (197 lb 9.6 oz)   Height: 5' 2" (1.575 m)       Wt Readings from Last 3 Encounters:   02/22/24 89.6 kg (197 lb 9.6 oz)   02/21/24 88.7 kg (195 lb 10.5 oz)   02/08/24 88.6 kg (195 lb 6.4 oz)       Physical Exam  Constitutional:       General: She is not in acute " distress.     Appearance: Normal appearance. She is well-developed.   HENT:      Head: Normocephalic and atraumatic.   Eyes:      Conjunctiva/sclera: Conjunctivae normal.   Cardiovascular:      Rate and Rhythm: Normal rate and regular rhythm.      Pulses: Normal pulses.      Heart sounds: Normal heart sounds. No murmur heard.  Pulmonary:      Effort: Pulmonary effort is normal. No respiratory distress.      Breath sounds: Normal breath sounds.   Abdominal:      General: Bowel sounds are normal. There is no distension.      Palpations: Abdomen is soft.      Tenderness: There is no abdominal tenderness.   Musculoskeletal:         General: Normal range of motion.      Cervical back: Normal range of motion and neck supple.   Skin:     General: Skin is warm and dry.      Findings: No rash.   Neurological:      General: No focal deficit present.      Mental Status: She is alert and oriented to person, place, and time.   Psychiatric:         Mood and Affect: Mood normal.         Behavior: Behavior normal.         Health Maintenance   Topic Date Due    Pap Smear  04/21/2026    TETANUS VACCINE  06/07/2031    Hepatitis C Screening  Completed    Lipid Panel  Completed

## 2024-02-23 ENCOUNTER — PATIENT MESSAGE (OUTPATIENT)
Dept: ENDOCRINOLOGY | Facility: CLINIC | Age: 29
End: 2024-02-23
Payer: COMMERCIAL

## 2024-02-23 RX ORDER — PANTOPRAZOLE SODIUM 40 MG/1
40 TABLET, DELAYED RELEASE ORAL
Qty: 90 TABLET | Refills: 0 | Status: SHIPPED | OUTPATIENT
Start: 2024-02-23 | End: 2024-04-24 | Stop reason: DRUGHIGH

## 2024-02-27 LAB
A ALTERNATA IGE QN: <0.1 KU/L
A FUMIGATUS IGE QN: <0.1 KU/L
ALLERGEN CHAETOMIUM GLOBOSUM IGE: <0.1 KU/L
BAHIA GRASS IGE QN: 15.2 KU/L
BALD CYPRESS IGE QN: <0.1 KU/L
BERMUDA GRASS IGE QN: 9.36 KU/L
C HERBARUM IGE QN: <0.1 KU/L
C LUNATA IGE QN: <0.1 KU/L
CAT DANDER IGE QN: 0.56 KU/L
CHAETOMIUM GLOB. CLASS: NORMAL
COMMON RAGWEED IGE QN: 0.17 KU/L
COTTONWOOD IGE QN: <0.1 KU/L
D FARINAE IGE QN: <0.1 KU/L
D PTERONYSS IGE QN: <0.1 KU/L
DEPRECATED A ALTERNATA IGE RAST QL: NORMAL
DEPRECATED A FUMIGATUS IGE RAST QL: NORMAL
DEPRECATED BAHIA GRASS IGE RAST QL: ABNORMAL
DEPRECATED BALD CYPRESS IGE RAST QL: NORMAL
DEPRECATED BERMUDA GRASS IGE RAST QL: ABNORMAL
DEPRECATED C HERBARUM IGE RAST QL: NORMAL
DEPRECATED C LUNATA IGE RAST QL: NORMAL
DEPRECATED CAT DANDER IGE RAST QL: ABNORMAL
DEPRECATED COMMON RAGWEED IGE RAST QL: ABNORMAL
DEPRECATED COTTONWOOD IGE RAST QL: NORMAL
DEPRECATED D FARINAE IGE RAST QL: NORMAL
DEPRECATED D PTERONYSS IGE RAST QL: NORMAL
DEPRECATED DOG DANDER IGE RAST QL: ABNORMAL
DEPRECATED ENGL PLANTAIN IGE RAST QL: NORMAL
DEPRECATED SILVER BIRCH IGE RAST QL: NORMAL
DOG DANDER IGE QN: 0.3 KU/L
ENGL PLANTAIN IGE QN: 0.1 KU/L
SILVER BIRCH IGE QN: <0.1 KU/L

## 2024-02-28 LAB
ALLERGEN WALNUT TREE IGE: <0.1 KU/L
ALLERGEN WHITE PINE TREE IGE: <0.1 KU/L
DEPRECATED ELDER IGE RAST QL: NORMAL
DEPRECATED GUINEA PIG EPITH IGE RAST QL: NORMAL
DEPRECATED HMTR EPITH IGE RAST QL: NORMAL
DEPRECATED HORSE DANDER IGE RAST QL: NORMAL
DEPRECATED JOHNSON GRASS IGE RAST QL: ABNORMAL
DEPRECATED LONDON PLANE IGE RAST QL: NORMAL
DEPRECATED MUGWORT IGE RAST QL: NORMAL
DEPRECATED P NOTATUM IGE RAST QL: NORMAL
DEPRECATED PECAN/HICK TREE IGE RAST QL: NORMAL
DEPRECATED RABBIT EPITH IGE RAST QL: NORMAL
DEPRECATED ROACH IGE RAST QL: NORMAL
DEPRECATED S ROSTRATA IGE RAST QL: NORMAL
DEPRECATED SALTWORT IGE RAST QL: NORMAL
DEPRECATED TIMOTHY IGE RAST QL: ABNORMAL
DEPRECATED WEST RAGWEED IGE RAST QL: NORMAL
DEPRECATED WHITE OAK IGE RAST QL: NORMAL
DEPRECATED WILLOW IGE RAST QL: NORMAL
ELDER IGE QN: <0.1 KU/L
GUINEA PIG EPITH IGE QN: <0.1 KU/L
HMTR EPITH IGE QN: <0.1 KU/L
HORSE DANDER IGE QN: <0.1 KU/L
JOHNSON GRASS IGE QN: 6.66 KU/L
LONDON PLANE IGE QN: <0.1 KU/L
MUGWORT IGE QN: <0.1 KU/L
P NOTATUM IGE QN: <0.1 KU/L
PECAN/HICK TREE IGE QN: <0.1 KU/L
RABBIT EPITH IGE QN: <0.1 KU/L
ROACH IGE QN: <0.1 KU/L
S ROSTRATA IGE QN: <0.1 KU/L
SALTWORT IGE QN: <0.1 KU/L
TIMOTHY IGE QN: 13.5 KU/L
WALNUT TREE CLASS: NORMAL
WEST RAGWEED IGE QN: <0.1 KU/L
WHITE OAK IGE QN: <0.1 KU/L
WHITE PINE CLASS: NORMAL
WILLOW IGE QN: <0.1 KU/L

## 2024-02-29 ENCOUNTER — PATIENT MESSAGE (OUTPATIENT)
Dept: ALLERGY | Facility: CLINIC | Age: 29
End: 2024-02-29
Payer: COMMERCIAL

## 2024-03-18 DIAGNOSIS — M25.531 PAIN IN BOTH WRISTS: ICD-10-CM

## 2024-03-18 DIAGNOSIS — M25.532 PAIN IN BOTH WRISTS: ICD-10-CM

## 2024-03-18 NOTE — TELEPHONE ENCOUNTER
Refill Routing Note   Medication(s) are not appropriate for processing by [facility] for the following reason(s):        Outside of protocol    [facility] action(s):  Route               Appointments  past 12m or future 3m with PCP    Date Provider   Last Visit   11/7/2023 Irasema Jaquez MD   Next Visit   Visit date not found Irasema Jaquez MD   ED visits in past 90 days: 0        Note composed:9:21 AM 03/18/2024

## 2024-03-19 RX ORDER — MELOXICAM 15 MG/1
15 TABLET ORAL DAILY
Qty: 30 TABLET | Refills: 2 | Status: SHIPPED | OUTPATIENT
Start: 2024-03-19

## 2024-03-25 DIAGNOSIS — F98.8 ATTENTION DEFICIT DISORDER (ADD) WITHOUT HYPERACTIVITY: ICD-10-CM

## 2024-03-25 RX ORDER — DEXTROAMPHETAMINE SACCHARATE, AMPHETAMINE ASPARTATE MONOHYDRATE, DEXTROAMPHETAMINE SULFATE AND AMPHETAMINE SULFATE 3.75; 3.75; 3.75; 3.75 MG/1; MG/1; MG/1; MG/1
15 CAPSULE, EXTENDED RELEASE ORAL EVERY MORNING
Qty: 30 CAPSULE | Refills: 0 | Status: SHIPPED | OUTPATIENT
Start: 2024-03-25 | End: 2024-04-24

## 2024-03-25 NOTE — TELEPHONE ENCOUNTER
Last OV: 02/22/24    Plan:   Follow up in about 3 months (around 5/22/2024), or if symptoms worsen or fail to improve, for Virtual Visit.

## 2024-03-27 ENCOUNTER — OFFICE VISIT (OUTPATIENT)
Dept: ALLERGY | Facility: CLINIC | Age: 29
End: 2024-03-27
Payer: COMMERCIAL

## 2024-03-27 VITALS — HEIGHT: 62 IN | BODY MASS INDEX: 36.35 KG/M2 | WEIGHT: 197.56 LBS

## 2024-03-27 DIAGNOSIS — H10.13 ALLERGIC CONJUNCTIVITIS, BILATERAL: ICD-10-CM

## 2024-03-27 DIAGNOSIS — L50.8 CHRONIC URTICARIA: ICD-10-CM

## 2024-03-27 DIAGNOSIS — J30.9 CHRONIC ALLERGIC RHINITIS: Primary | ICD-10-CM

## 2024-03-27 PROCEDURE — 3008F BODY MASS INDEX DOCD: CPT | Mod: CPTII,S$GLB,, | Performed by: ALLERGY & IMMUNOLOGY

## 2024-03-27 PROCEDURE — 1160F RVW MEDS BY RX/DR IN RCRD: CPT | Mod: CPTII,S$GLB,, | Performed by: ALLERGY & IMMUNOLOGY

## 2024-03-27 PROCEDURE — 99215 OFFICE O/P EST HI 40 MIN: CPT | Mod: S$GLB,,, | Performed by: ALLERGY & IMMUNOLOGY

## 2024-03-27 PROCEDURE — 1159F MED LIST DOCD IN RCRD: CPT | Mod: CPTII,S$GLB,, | Performed by: ALLERGY & IMMUNOLOGY

## 2024-03-27 PROCEDURE — 99999 PR PBB SHADOW E&M-EST. PATIENT-LVL III: CPT | Mod: PBBFAC,,, | Performed by: ALLERGY & IMMUNOLOGY

## 2024-03-27 NOTE — PROGRESS NOTES
Subjective:       Patient ID: Alyson Vila is a 28 y.o. female.    Chief Complaint:  No chief complaint on file.      29 yo woman presents for continued evaluation of allergic rhinitis and conjunctivitis ad chronic urticaria. She was last seen 2/21/2024. She had immunocaps then with positives to cats and grass. She is taking zyrtec 10 daily to BID. Not having many hives except under her wrist brace she wears for carpal tunnel. No angioedema. She has rhinitis with sneeze, runny nose, congestion, itchy watery eye all the time so would like to consider IT    Prior History taken 2/21/2024: consult from SOREN Urena for hives. She states 1/29 she broke out in hives- red raised and very itchy. Would come and go but had every day for 2 weeks and getting worse. She did see a pig at vet clinic and thought that may be cause but continued to get worse. 2/8 was seen by UC and given steroid shot and oral steroid which helped some but still with itch and few hives. Now seem to be gone. Was worse at her apartment so she deep cleaned and not sure if that helped. She does live and work with cats and now wonders if is trigger. Flu was going around work and she did have right before hives. She has rhinitis with sneeze, runny nose, congestion, itchy watery eye so takes zyrtec daily. Was on shots in past for about 18 months and helped. She is allergic to grass and ragweed. No asthma. No eczema. No food, insect or latex allergy. She has Hashimoto thyroiditis and insulin resistance.         Environmental History: see history section for home environment  Review of Systems   Constitutional:  Negative for chills and fever.   HENT:  Positive for congestion, postnasal drip, rhinorrhea and sneezing. Negative for ear pain, facial swelling, nosebleeds and sinus pressure.    Eyes:  Positive for discharge and itching. Negative for redness.   Respiratory:  Negative for cough, chest tightness, shortness of breath and wheezing.    Skin:   Positive for color change and rash.        Objective:      Physical Exam  Vitals and nursing note reviewed.   Constitutional:       General: She is not in acute distress.     Appearance: Normal appearance. She is not ill-appearing.   HENT:      Nose: No rhinorrhea.   Eyes:      General:         Right eye: No discharge.         Left eye: No discharge.      Conjunctiva/sclera: Conjunctivae normal.   Pulmonary:      Effort: Pulmonary effort is normal. No respiratory distress.   Abdominal:      General: There is no distension.   Skin:     General: Skin is warm and dry.      Findings: No erythema or rash.   Neurological:      Mental Status: She is alert and oriented to person, place, and time.   Psychiatric:         Mood and Affect: Mood normal.         Behavior: Behavior normal.         Laboratory:   none performed   Assessment:       1. Chronic allergic rhinitis    2. Chronic urticaria    3. Allergic conjunctivitis, bilateral         Plan:       Urticaria- advised is immune mediated, discussed in detail. If flare can take zyrtec up to 20 mg BID  Rhinitis- cat avoidance. continue cetirizine 10 mg daily and can increase to BID as needed  Patient interested in allergy shots.  All risks and benefits discussed.  Protocol discussed in detail including need to remain in clinic for 30 minutes after injections.  All questions answered, handouts with details of allergy shots provided and informed consent signed and witnessed.  Patient advised to remain off beta blockers while on allergy shots and to notify injection clinic and MD of any new medications starts.      I spent a total of 40 minutes on the day of the visit.  This includes face to face time and non-face to face time preparing to see the patient (eg, review of tests), obtaining and/or reviewing separately obtained history, documenting clinical information in the electronic or other health record, independently interpreting results and communicating results to the  patient/family/caregiver, or care coordinator.

## 2024-03-28 ENCOUNTER — TELEPHONE (OUTPATIENT)
Dept: ALLERGY | Facility: CLINIC | Age: 29
End: 2024-03-28
Payer: COMMERCIAL

## 2024-04-03 PROCEDURE — 95165 ANTIGEN THERAPY SERVICES: CPT | Mod: S$GLB,,, | Performed by: ALLERGY & IMMUNOLOGY

## 2024-04-15 ENCOUNTER — PATIENT MESSAGE (OUTPATIENT)
Dept: ENDOCRINOLOGY | Facility: CLINIC | Age: 29
End: 2024-04-15
Payer: COMMERCIAL

## 2024-04-15 DIAGNOSIS — R73.02 IGT (IMPAIRED GLUCOSE TOLERANCE): Primary | ICD-10-CM

## 2024-04-17 ENCOUNTER — LAB VISIT (OUTPATIENT)
Dept: LAB | Facility: HOSPITAL | Age: 29
End: 2024-04-17
Payer: COMMERCIAL

## 2024-04-17 ENCOUNTER — TELEPHONE (OUTPATIENT)
Dept: ALLERGY | Facility: CLINIC | Age: 29
End: 2024-04-17
Payer: COMMERCIAL

## 2024-04-17 DIAGNOSIS — R73.02 IGT (IMPAIRED GLUCOSE TOLERANCE): ICD-10-CM

## 2024-04-17 LAB
ANION GAP SERPL CALC-SCNC: 9 MMOL/L (ref 8–16)
BUN SERPL-MCNC: 20 MG/DL (ref 6–20)
CALCIUM SERPL-MCNC: 9.5 MG/DL (ref 8.7–10.5)
CHLORIDE SERPL-SCNC: 105 MMOL/L (ref 95–110)
CO2 SERPL-SCNC: 26 MMOL/L (ref 23–29)
CREAT SERPL-MCNC: 0.7 MG/DL (ref 0.5–1.4)
EST. GFR  (NO RACE VARIABLE): >60 ML/MIN/1.73 M^2
ESTIMATED AVG GLUCOSE: 108 MG/DL (ref 68–131)
GLUCOSE SERPL-MCNC: 86 MG/DL (ref 70–110)
HBA1C MFR BLD: 5.4 % (ref 4–5.6)
POTASSIUM SERPL-SCNC: 5.1 MMOL/L (ref 3.5–5.1)
SODIUM SERPL-SCNC: 140 MMOL/L (ref 136–145)

## 2024-04-17 PROCEDURE — 80048 BASIC METABOLIC PNL TOTAL CA: CPT | Performed by: INTERNAL MEDICINE

## 2024-04-17 PROCEDURE — 36415 COLL VENOUS BLD VENIPUNCTURE: CPT | Mod: PO | Performed by: INTERNAL MEDICINE

## 2024-04-17 PROCEDURE — 83036 HEMOGLOBIN GLYCOSYLATED A1C: CPT | Performed by: INTERNAL MEDICINE

## 2024-04-18 ENCOUNTER — PATIENT MESSAGE (OUTPATIENT)
Dept: ENDOCRINOLOGY | Facility: CLINIC | Age: 29
End: 2024-04-18
Payer: COMMERCIAL

## 2024-04-23 ENCOUNTER — CLINICAL SUPPORT (OUTPATIENT)
Dept: ALLERGY | Facility: CLINIC | Age: 29
End: 2024-04-23
Payer: COMMERCIAL

## 2024-04-23 DIAGNOSIS — J30.9 CHRONIC ALLERGIC RHINITIS: Primary | ICD-10-CM

## 2024-04-23 PROCEDURE — 99999 PR PBB SHADOW E&M-EST. PATIENT-LVL I: CPT | Mod: PBBFAC,,,

## 2024-04-23 PROCEDURE — 95115 IMMUNOTHERAPY ONE INJECTION: CPT | Mod: S$GLB,,, | Performed by: STUDENT IN AN ORGANIZED HEALTH CARE EDUCATION/TRAINING PROGRAM

## 2024-04-24 ENCOUNTER — OFFICE VISIT (OUTPATIENT)
Dept: GASTROENTEROLOGY | Facility: CLINIC | Age: 29
End: 2024-04-24
Payer: COMMERCIAL

## 2024-04-24 VITALS — HEIGHT: 62 IN | WEIGHT: 206.56 LBS | BODY MASS INDEX: 38.01 KG/M2

## 2024-04-24 DIAGNOSIS — K64.8 INTERNAL HEMORRHOIDS: ICD-10-CM

## 2024-04-24 DIAGNOSIS — K29.50 CHRONIC GASTRITIS WITHOUT BLEEDING, UNSPECIFIED GASTRITIS TYPE: ICD-10-CM

## 2024-04-24 DIAGNOSIS — K21.00 GASTROESOPHAGEAL REFLUX DISEASE WITH ESOPHAGITIS WITHOUT HEMORRHAGE: ICD-10-CM

## 2024-04-24 DIAGNOSIS — Z98.890 HISTORY OF ESOPHAGOGASTRODUODENOSCOPY (EGD): Primary | ICD-10-CM

## 2024-04-24 DIAGNOSIS — Z98.890 HISTORY OF COLONOSCOPY: ICD-10-CM

## 2024-04-24 DIAGNOSIS — K58.1 IRRITABLE BOWEL SYNDROME WITH CONSTIPATION: ICD-10-CM

## 2024-04-24 PROCEDURE — 99999 PR PBB SHADOW E&M-EST. PATIENT-LVL IV: CPT | Mod: PBBFAC,,, | Performed by: NURSE PRACTITIONER

## 2024-04-24 PROCEDURE — 1159F MED LIST DOCD IN RCRD: CPT | Mod: CPTII,S$GLB,, | Performed by: NURSE PRACTITIONER

## 2024-04-24 PROCEDURE — 99213 OFFICE O/P EST LOW 20 MIN: CPT | Mod: S$GLB,,, | Performed by: NURSE PRACTITIONER

## 2024-04-24 PROCEDURE — 3008F BODY MASS INDEX DOCD: CPT | Mod: CPTII,S$GLB,, | Performed by: NURSE PRACTITIONER

## 2024-04-24 PROCEDURE — 1160F RVW MEDS BY RX/DR IN RCRD: CPT | Mod: CPTII,S$GLB,, | Performed by: NURSE PRACTITIONER

## 2024-04-24 PROCEDURE — 3044F HG A1C LEVEL LT 7.0%: CPT | Mod: CPTII,S$GLB,, | Performed by: NURSE PRACTITIONER

## 2024-04-24 RX ORDER — POLYETHYLENE GLYCOL 3350 17 G/17G
17 POWDER, FOR SOLUTION ORAL DAILY
Qty: 510 G | Refills: 2 | Status: SHIPPED | OUTPATIENT
Start: 2024-04-24

## 2024-04-24 RX ORDER — PANTOPRAZOLE SODIUM 20 MG/1
TABLET, DELAYED RELEASE ORAL
Qty: 21 TABLET | Refills: 0 | Status: SHIPPED | OUTPATIENT
Start: 2024-04-24 | End: 2024-05-22

## 2024-04-24 NOTE — PROGRESS NOTES
Subjective:       Patient ID: Alyson Vila is a 28 y.o. female Body mass index is 37.78 kg/m².    Chief Complaint: Follow-up    This patient is established.    Abdominal Pain  The problem has been resolved. The pain is at a severity of 0/10 (currently). Associated symptoms include constipation (started in 11/2023; bowel movements are daily to once every other day of firm stools). Pertinent negatives include no belching, diarrhea, dysuria, fever, flatus, frequency, hematochezia, melena, nausea (has resolved; zofran PRN), vomiting or weight loss (has stopped taking ozempic). The pain is aggravated by deep breathing and eating (sitting makes bloating worse). Relieved by: laying. She has tried proton pump inhibitors (bentyl 10 mg QID PRN- taking BID; protonix 40 mg once daily) for the symptoms. The treatment provided significant relief. Prior diagnostic workup includes ultrasound, lower endoscopy, GI consult and upper endoscopy (scheduled for CT scan to be done soon). Her past medical history is significant for GERD (controlled with taking protonix; PAST TREATMENT: omeprazole- helped in the past but not recently when taken once). There is no history of Crohn's disease, gallstones, pancreatitis, PUD or ulcerative colitis.     Review of Systems   Constitutional:  Negative for appetite change (back to normal), chills, fatigue, fever and weight loss (has stopped taking ozempic).        Taking Mobic daily   HENT:  Negative for sore throat and trouble swallowing.    Respiratory:  Negative for cough, choking and shortness of breath.    Cardiovascular:  Negative for chest pain.   Gastrointestinal:  Positive for constipation (started in 11/2023; bowel movements are daily to once every other day of firm stools). Negative for abdominal pain, anal bleeding, blood in stool, diarrhea, flatus, hematochezia, melena, nausea (has resolved; zofran PRN), rectal pain and vomiting.   Genitourinary:  Negative for difficulty urinating,  dysuria, flank pain and frequency.   Neurological:  Negative for weakness.       Patient's last menstrual period was 04/18/2024.  Past Medical History:   Diagnosis Date    Insulin resistance     PCOS (polycystic ovarian syndrome)     Thyroid disease     Hashimoto's     Past Surgical History:   Procedure Laterality Date    COLONOSCOPY N/A 12/06/2023    Procedure: COLONOSCOPY;  Surgeon: Ken Diaz Jr., MD;  Location: [facility] ENDO;  Service: Endoscopy;  Laterality: N/A; ; Repeat colonoscopy at age 46 for screening    COLONOSCOPY N/A 12/04/2023    not completed.    ESOPHAGOGASTRODUODENOSCOPY N/A 01/24/2024    Procedure: EGD (ESOPHAGOGASTRODUODENOSCOPY);  Surgeon: Ken Diaz Jr., MD;  Location: [facility] ENDO;  Service: Endoscopy;  Laterality: N/A;     Family History   Problem Relation Name Age of Onset    Alcohol abuse Mother Nathalie     Cervical cancer Mother Nathalie     Colon cancer Neg Hx      Crohn's disease Neg Hx      Esophageal cancer Neg Hx      Ulcerative colitis Neg Hx      Stomach cancer Neg Hx       Social History     Tobacco Use    Smoking status: Never   Substance Use Topics    Alcohol use: Yes     Comment: Socially only. Maybe 2x per month    Drug use: Never     Wt Readings from Last 10 Encounters:   04/24/24 93.7 kg (206 lb 9.1 oz)   03/27/24 89.6 kg (197 lb 8.5 oz)   02/22/24 89.6 kg (197 lb 9.6 oz)   02/21/24 88.7 kg (195 lb 10.5 oz)   02/08/24 88.6 kg (195 lb 6.4 oz)   01/19/24 86.2 kg (190 lb)   12/05/23 89.8 kg (198 lb)   11/15/23 90.2 kg (198 lb 13.7 oz)   11/10/23 91.1 kg (200 lb 12.8 oz)   10/31/23 90.7 kg (200 lb)     Lab Results   Component Value Date    WBC 5.73 11/10/2023    WBC 5.73 11/10/2023    HGB 12.7 11/10/2023    HGB 12.7 11/10/2023    HCT 38.1 11/10/2023    HCT 38.1 11/10/2023    MCV 93 11/10/2023    MCV 93 11/10/2023     11/10/2023     11/10/2023     CMP  Sodium   Date Value Ref Range Status   04/17/2024 140 136 - 145 mmol/L Final     Potassium   Date Value Ref Range  Status   04/17/2024 5.1 3.5 - 5.1 mmol/L Final     Chloride   Date Value Ref Range Status   04/17/2024 105 95 - 110 mmol/L Final     CO2   Date Value Ref Range Status   04/17/2024 26 23 - 29 mmol/L Final     Glucose   Date Value Ref Range Status   04/17/2024 86 70 - 110 mg/dL Final     BUN   Date Value Ref Range Status   04/17/2024 20 6 - 20 mg/dL Final     Creatinine   Date Value Ref Range Status   04/17/2024 0.7 0.5 - 1.4 mg/dL Final     Calcium   Date Value Ref Range Status   04/17/2024 9.5 8.7 - 10.5 mg/dL Final     Total Protein   Date Value Ref Range Status   11/22/2023 7.0 6.1 - 7.9 g/dL Final   11/10/2023 7.5 6.0 - 8.4 g/dL Final   11/10/2023 7.5 6.0 - 8.4 g/dL Final     Albumin   Date Value Ref Range Status   11/22/2023 4.3 3.5 - 4.8 g/dL Final   11/10/2023 3.6 3.5 - 5.2 g/dL Final   11/10/2023 3.6 3.5 - 5.2 g/dL Final     Total Bilirubin   Date Value Ref Range Status   11/22/2023 0.3 (L) 0.4 - 2.0 mg/dL Final   11/10/2023 0.3 0.1 - 1.0 mg/dL Final     Comment:     For infants and newborns, interpretation of results should be based  on gestational age, weight and in agreement with clinical  observations.    Premature Infant recommended reference ranges:  Up to 24 hours.............<8.0 mg/dL  Up to 48 hours............<12.0 mg/dL  3-5 days..................<15.0 mg/dL  6-29 days.................<15.0 mg/dL     11/10/2023 0.3 0.1 - 1.0 mg/dL Final     Comment:     For infants and newborns, interpretation of results should be based  on gestational age, weight and in agreement with clinical  observations.    Premature Infant recommended reference ranges:  Up to 24 hours.............<8.0 mg/dL  Up to 48 hours............<12.0 mg/dL  3-5 days..................<15.0 mg/dL  6-29 days.................<15.0 mg/dL       Alkaline Phosphatase   Date Value Ref Range Status   11/22/2023 56 28 - 116 U/L Final   11/10/2023 60 55 - 135 U/L Final   11/10/2023 60 55 - 135 U/L Final     AST   Date Value Ref Range Status    11/22/2023 28 10 - 34 U/L Final   11/10/2023 23 10 - 40 U/L Final   11/10/2023 23 10 - 40 U/L Final     ALT   Date Value Ref Range Status   11/22/2023 28 5 - 41 U/L Final   11/10/2023 23 10 - 44 U/L Final   11/10/2023 23 10 - 44 U/L Final     Anion Gap   Date Value Ref Range Status   04/17/2024 9 8 - 16 mmol/L Final     Lab Results   Component Value Date    AMYLASE 38 11/10/2023     Lab Results   Component Value Date    LIPASE 21 11/10/2023     Lab Results   Component Value Date    TSH 2.020 11/10/2023     Reviewed prior medical records including radiology report of 11/10/2023 limited abdominal ultrasound.    Objective:      Physical Exam  Vitals and nursing note reviewed.   Constitutional:       General: She is not in acute distress.     Appearance: Normal appearance. She is well-developed. She is not diaphoretic.   HENT:      Mouth/Throat:      Lips: Pink. No lesions.      Mouth: Mucous membranes are moist. No oral lesions.      Tongue: No lesions.      Pharynx: Oropharynx is clear. No pharyngeal swelling or posterior oropharyngeal erythema.   Eyes:      General: No scleral icterus.     Conjunctiva/sclera: Conjunctivae normal.   Pulmonary:      Effort: Pulmonary effort is normal. No respiratory distress.      Breath sounds: Normal breath sounds. No wheezing.   Abdominal:      General: Bowel sounds are normal. There is no distension or abdominal bruit.      Palpations: Abdomen is soft. Abdomen is not rigid. There is no mass.      Tenderness: There is no abdominal tenderness. There is no guarding or rebound. Negative signs include Thapa's sign and McBurney's sign.   Skin:     General: Skin is warm and dry.      Coloration: Skin is not jaundiced or pale.      Findings: No erythema or rash.   Neurological:      Mental Status: She is alert and oriented to person, place, and time.   Psychiatric:         Behavior: Behavior normal.         Thought Content: Thought content normal.         Judgment: Judgment normal.          Assessment:       1. History of esophagogastroduodenoscopy (EGD)    2. Gastroesophageal reflux disease with esophagitis without hemorrhage    3. Chronic gastritis without bleeding, unspecified gastritis type    4. History of colonoscopy    5. Internal hemorrhoids    6. Irritable bowel syndrome with constipation          Plan:       History of esophagogastroduodenoscopy (EGD), Gastroesophageal reflux disease with esophagitis without hemorrhage, & Chronic gastritis without bleeding, unspecified gastritis type  -  WEAN   pantoprazole (PROTONIX) 20 MG tablet; Take 1 tablet (20 mg total) by mouth once daily for 14 days, THEN 1 tablet (20 mg total) every other day for 14 days. Then discontinue thereafter..  Dispense: 21 tablet; Refill: 0  - avoid/minimize use of NSAIDs- since they can cause GI upset, bleeding and/or ulcers. If NSAID must be taken, recommend take with food.    History of colonoscopy  - next surveillance colonoscopy due at 46 years old    Internal hemorrhoids  - avoid constipation and straining with bowel movements; try using an OTC stool softener as directed and increase fiber in diet (20-30 grams daily)/OTC fiber supplement such as metamucil (take as directed)  - recommend SITZ baths  - possible referral to colorectal surgery if symptoms persist    Irritable bowel syndrome with constipation  -  START   polyethylene glycol (GLYCOLAX) 17 gram/dose powder; Take 17 g by mouth once daily. Mix with 8 oz of water/liquid.  Dispense: 510 g; Refill: 2  Recommend daily exercise as tolerated, adequate water intake (six 8-oz glasses of water daily), and high fiber diet. OTC fiber supplements are recommended if diet does not reach daily fiber goal (20-30 grams daily), such as Metamucil, Citrucel, or FiberCon (take as directed, separate from other oral medications by >2 hours).  -Recommend taking an OTC stool softener such as Colace as directed to avoid hard stools and straining with bowel movements PRN  - CAN  CONTINUE TAKING BENTYL 10 MG PO QID PRN ABDOMINAL PAIN/SPASMS  -If still no improvement with these measures, call/follow-up      Follow up in about 1 month (around 5/24/2024), or if symptoms worsen or fail to improve.      If no improvement in symptoms or symptoms worsen, call/follow-up at clinic or go to ER.        25 minutes of total time spent on the encounter, which includes face to face time and non-face to face time preparing to see the patient (e.g., review of tests), Obtaining and/or reviewing separately obtained history, Documenting clinical information in the electronic or other health record, Independently interpreting results (not separately reported) and communicating results to the patient/family/caregiver, or Care coordination (not separately reported).

## 2024-04-24 NOTE — PATIENT INSTRUCTIONS
Acid Reflux and GERD in Adults Discharge Instructions   About this topic   GERD stands for gastroesophageal reflux disease. It is sometimes called reflux or acid reflux. Acid reflux happens when your stomach acid backs up into your esophagus, the tube that carries your food from your mouth to your stomach. This can be uncomfortable. You may have stomach or chest pain (heartburn), trouble swallowing, or an upset stomach. Some people have a cough or sore throat.  Most of the time, you can use over-the-counter medicines to help with this problem.       What care is needed at home?   Ask your doctor what you need to do when you go home. Make sure you ask questions if you do not understand what the doctor says.  Raise the head of your bed by 6 to 8 inches (15 to 20 cm). Use wood or rubber blocks under 2 legs or try a foam wedge under your mattress. Just sleeping with your head raised on pillows is not enough.  Avoid beer, wine, and mixed drinks and avoid caffeine.  Keep a healthy weight. If you are too heavy, lose weight.  If you smoke, try to quit. Your doctor or nurse can help.  Keep a diary of your signs. Write down what you had to eat before you had reflux. This will help you learn which foods cause you problems. For some people, they need to avoid coffee, chocolate, alcohol, spicy or fatty foods, or peppermint.  Avoid eating for 2 to 3 hours before bedtime. Lying down after you eat can make reflux worse.  Avoid belts and clothes that are too tight.  What follow-up care is needed?   Your doctor may ask you to make visits to the office to check on your progress. Be sure to keep these visits.  What drugs may be needed?   The doctor may order drugs to:  Relieve heartburn  Prevent reflux  Lessen acid production  Heal the esophageal lining  Will physical activity be limited?   Your physical activities will not be limited.  What problems could happen?   Asthma  Precancerous changes in the food pipe  Long-term cough  Dental  problems  Higher risk of cancer of the food pipe. This is esophageal cancer.  Narrowing of the food pipe. This is a stricture.  Open sore in the food pipe. This is an ulcer.  When do I need to call the doctor?   You have signs of a heart attack, which may include:  Severe chest pain, pressure, or discomfort with:  Breathing trouble, sweating, upset stomach, or cold, clammy skin.  Pain in your arms, back, or jaw.  Worse pain with activity like walking up stairs.  Fast or irregular heartbeat.  Feeling dizzy, faint, or weak.  You have sudden, severe belly pain or the belly pain is constant.  You have blood in the undigested food and acid that comes up, or stool that looks red, black, or like tar.  You feel like your food gets stuck or you have pain when you swallow.  You lose weight when you are not trying to.  You choke when you are eating.  Your reflux is very bad, very frequent, or not helped by over-the-counter medicines.  You keep throwing up.  Teach Back: Helping You Understand   The Teach Back Method helps you understand the information we are giving you. After you talk with the staff, tell them in your own words what you learned. This helps to make sure the staff has described each thing clearly. It also helps to explain things that may have been confusing. Before going home, make sure you can do these:  I can tell you about my condition.  I can tell you what changes I need to make with my eating habits to ease the reflux.  I can tell you what I will do if I am throwing up fluid that looks like blood or coffee grounds.  Where can I learn more?   American [employer]  https://familydoctor.org/condition/refluxacid-reflux/   [facility] Choices  https://www.nhs.uk/conditions/heartburn-and-acid-reflux/   Last Reviewed Date   2021-06-09  Consumer Information Use and Disclaimer   This information is not specific medical advice and does not replace information you receive from your health care provider. This  "is only a brief summary of general information. It does NOT include all information about conditions, illnesses, injuries, tests, procedures, treatments, therapies, discharge instructions or life-style choices that may apply to you. You must talk with your health care provider for complete information about your health and treatment options. This information should not be used to decide whether or not to accept your health care providers advice, instructions or recommendations. Only your health care provider has the knowledge and training to provide advice that is right for you.  Copyright   Copyright © 2021 UpToDate, Inc. and its affiliates and/or licensors. All rights reserved. Gastritis   The Basics   Written by the doctors and editors at [employer]   What is gastritis? -- "Gastritis" means inflammation of the stomach lining (figure 1).  Some people have gastritis that comes on suddenly and lasts only for a short time. Doctors call this "acute" gastritis. Other people have gastritis that lasts for months or years. Doctors call this "chronic" gastritis.  What causes gastritis? -- Different things can cause gastritis, including:  An infection in the stomach from bacteria called "H. pylori"  Medicines called "nonsteroidal antiinflammatory drugs" (NSAIDs) - These include aspirin, ibuprofen (brand names: Advil, Motrin), and naproxen (brand names: Aleve, Naprosyn).  Drinking alcohol  Conditions in which the body's infection-fighting system attacks the stomach lining  Having a serious or life-threatening illness  What are the symptoms of gastritis? -- People with gastritis have no symptoms. When people do have symptoms, they are due to other conditions that can happen with gastritis, like ulcers. Symptoms from ulcers include:  Pain in the upper belly  Feeling bloated, or feeling full after eating a small amount of food  Decreased appetite  Nausea or vomiting  Vomiting blood, or having black-colored bowel " "movements  Feeling more tired than usual - This happens if people with gastritis get a condition called "anemia."  Should I call my doctor or nurse? -- Call your doctor or nurse if:  You have belly pain that gets worse or doesn't go away  You vomit blood or have black bowel movements  You are losing weight (without trying to)  Will I need tests? -- Probably. Your doctor or nurse will ask about your symptoms and do an exam. They might also do:  An upper endoscopy - During this procedure, the doctor puts a thin tube with a camera on the end into your mouth and down into your stomach (figure 2). they will look at the inside of your stomach. During the procedure, they might also do a test called a biopsy. For a biopsy, the doctor takes a small sample of the stomach lining. Then another doctor looks at the sample under a microscope.  Tests to check for H. pylori infection. These can include:  Blood tests  Breath tests - These tests measure substances in your breath after you drink a special liquid.  Tests on a small sample of your bowel movement  Blood tests to check for anemia  How is gastritis treated? -- Treatment depends on what's causing your gastritis.  For example, if NSAIDs are causing your gastritis, your doctor will recommend that you not take those medicines. If alcohol is causing your gastritis, they will recommend that you stop drinking alcohol.  Doctors can use medicines to treat gastritis caused by an H. pylori infection. Most people take 3 or more medicines for 2 weeks. The treatment includes antibiotics plus medicine that helps the stomach make less acid.  Doctors can use medicines that reduce or block stomach acid to treat other causes of gastritis (table 1). The main types of medicines that reduce or block stomach acid are:  Antacids  Surface agents  Histamine blockers  Proton pump inhibitors  If your doctor recommends acid-reducing treatment, they will tell you which medicine to use.  What happens " after treatment? -- Sometimes, people who are treated for an H. pylori infection need follow-up tests to make sure the infection is gone. Follow-up tests include breath tests, lab tests on a sample of bowel movement, or endoscopy.  All topics are updated as new evidence becomes available and our peer review process is complete.  This topic retrieved from [employer] on: Sep 21, 2021.  Topic 24076 Version 8.0  Release: 29.4.2 - C29.263  © 2021 UpToDate, Inc. and/or its affiliates. All rights reserved.  figure 1: Upper digestive tract     The upper digestive tract includes the esophagus (the tube that connects the mouth to the stomach), the stomach, and the duodenum (the first part of the small intestine).  Graphic 21658 Version 6.0    figure 2: Upper endoscopy     During an upper endoscopy, you lie down and the doctor puts a thin tube with a camera and light on the end (called an endoscope) into your mouth and down into your esophagus, stomach, and duodenum (the first part of your small intestine). The camera sends pictures from inside your body to a television screen. That way, your doctor can see the inside of your esophagus, stomach, and duodenum.  Graphic 49359 Version 4.0    table 1: Medicines used to reduce stomach acid  Medicine type  Medicine name examples    Antacids* Calcium carbonate (sample brand names: Maalox, Tums)    Aluminum hydroxide, magnesium hydroxide, and simethicone (sample brand name: Mylanta)   Surface agents Sucralfate (brand name: Carafate)   Histamine blockers¶  Famotidine (brand name: Pepcid)    Cimetidine (brand name: Tagamet)   Proton pump inhibitors Omeprazole (brand name: Prilosec)    Esomeprazole (brand name: Nexium)    Pantoprazole (brand name: Protonix)    Lansoprazole (brand name: Prevacid)    Dexlansoprazole (brand name: Dexilant)    Rabeprazole (brand name: AcipHex)   Graphic 99759 Version 14.0  Consumer Information Use and Disclaimer   This information is not specific medical  advice and does not replace information you receive from your health care provider. This is only a brief summary of general information. It does NOT include all information about conditions, illnesses, injuries, tests, procedures, treatments, therapies, discharge instructions or life-style choices that may apply to you. You must talk with your health care provider for complete information about your health and treatment options. This information should not be used to decide whether or not to accept your health care provider's advice, instructions or recommendations. Only your health care provider has the knowledge and training to provide advice that is right for you. The use of this information is governed by the [employer] End User License Agreement, available at https://www.[employer]/en/solutions/19pay/about/tran.The use of [employer] content is governed by the [employer] Terms of Use. ©2021 UpToDate, Inc. All rights reserved.  Copyright   © 2021 UpToDate, Inc. and/or its affiliates. All rights reserved.      Gastritis Discharge Instructions   About this topic   Gastritis is inflammation of the lining of the stomach. Sometimes gastritis is caused by bacteria. Other times, it can be caused by drugs. Some types of drugs can cause gastritis. The most common are nonsteroidal anti-inflammatory drugs (NSAIDs) like ibuprofen or naproxen. Gastritis can also be caused by other things like drinking alcohol or having a serious illness. It can also happen if you have a health problem in which the bodys own infection fighting system attacks the stomach lining. Based on the cause, you may need to take an antibiotic or other medicine to treat your gastritis. If so, be sure you finish all of the medicine that is ordered.     What care is needed at home?   Ask your doctor what you need to do when you go home. Make sure you ask questions if you do not understand what the doctor says.  Eat small meals more often to help with  belly pain.  Keep a diary about your pain and the foods you eat. Then you can avoid those that bother your stomach.  Avoid or limit spicy foods.  Avoid or limit beer, wine, and mixed drinks.  If you smoke, try to quit. Your doctor or nurse can help.  Try to learn ways to manage stress. Stress may cause the acid levels in your stomach to rise.  If possible, avoid long-term use of aspirin and other anti-inflammatory drugs.  What follow-up care is needed?   Your doctor may ask you to make visits to the office to check on your progress. Be sure to keep these visits.  What drugs may be needed?   The doctor may order drugs to:  Fight an infection  Control how much acid your stomach makes  Help healing  Will physical activity be limited?   You may want to limit your activity if you have belly pain. Having an upset stomach or throwing up may also limit what you do. You may need more rest if you feel weak or tired.  What problems could happen?   Stomach ulcer or bleeding  Stomach cancer  When do I need to call the doctor?   You start throwing up blood or pass a lot of blood in your stool.  Your belly pain becomes much worse all of a sudden or over a few hours.  Your belly becomes hard or tender.  You have chest pain or trouble breathing  Your stools are bright red, black, or tar-colored.  You are throwing up often.  Your belly pain does not get better even after taking medicine, changing your diet, and following treatment instructions.  You lose a lot of weight without trying.  Teach Back: Helping You Understand   The Teach Back Method helps you understand the information we are giving you. After you talk with the staff, tell them in your own words what you learned. This helps to make sure the staff has described each thing clearly. It also helps to explain things that may have been confusing. Before going home, make sure you can do these:  I can tell you about my condition.  I can tell you if I need to make changes with my  diet or drugs.  I can tell you what I will do if I throw up blood or have bloody or black tarry stools.  Where can I learn more?   [facility] [place]  https://www.nhs.uk/conditions/gastritis/   Last Reviewed Date   2021-06-08  Consumer Information Use and Disclaimer   This information is not specific medical advice and does not replace information you receive from your health care provider. This is only a brief summary of general information. It does NOT include all information about conditions, illnesses, injuries, tests, procedures, treatments, therapies, discharge instructions or life-style choices that may apply to you. You must talk with your health care provider for complete information about your health and treatment options. This information should not be used to decide whether or not to accept your health care providers advice, instructions or recommendations. Only your health care provider has the knowledge and training to provide advice that is right for you.  Copyright   Copyright © 2021 UpToDate, Inc. and its affiliates and/or licensors. All rights reserved.   Hemorrhoids    Hemorrhoids are swollen and inflamed veins inside the rectum and near the anus. The rectum is the last several inches of the colon. The anus is the passage between the rectum and the outside of the body.  Causes  The veins can become swollen due to increased pressure in them. This is most often caused by:  Chronic constipation or diarrhea  Straining when having a bowel movement  Sitting too long on the toilet  A low-fiber diet  Pregnancy  Symptoms  Bleeding from the rectum (this may be noticeable after bowel movements)  Lump near the anus  Itching around the anus  Pain around the anus  There are different types of hemorrhoids. Depending on the type you have and the severity, you may be able to treat yourself at home. In some cases, a procedure may be the best treatment option. Your healthcare provider can tell you more  about this, if needed.  Home care  General care  To get relief from pain or itching, try:  Topical products. Your healthcare provider may prescribe or recommend creams, ointments, or pads that can be applied to the hemorrhoid. Use these exactly as directed.  Medicines. Your healthcare provider may recommend stool softeners, suppositories, or laxatives to help manage constipation. Use these exactly as directed.  Sitz baths. A sitz bath involves sitting in a few inches of warm bath water. Be careful not to make the water so hot that you burn yourself--test it before sitting in it. Soak for about 10 to 15 minutes a few times a day. This may help relieve pain.  Tips to help prevent hemorrhoids  Eat more fiber. Fiber adds bulk to stool and absorbs water as it moves through your colon. This makes stool softer and easier to pass.  Increase the fiber in your diet with more fiber-rich foods. These include fresh fruit, vegetables, and whole grains.  Take a fiber supplement or bulking agent, if advised to by your provider. These include products such as psyllium or methylcellulose.  Drink plenty of water, if directed to by your provider. This can help keep stool soft.  Be more active. Frequent exercise aids digestion and helps prevent constipation. It may also help make bowel movements more regular.  Dont strain during bowel movements. This can make hemorrhoids more likely. Also, dont sit on the toilet for long periods of time.  Follow-up care  Follow up with your healthcare provider, or as advised. If a culture or imaging tests were done, you will be notified of the results when they are ready. This may take a few days or longer.  When to seek medical advice  Call your healthcare provider right away if any of these occur:  Increased bleeding from the rectum  Increased pain around the rectum or anus  Weakness or dizziness  Call 911  Call 911 or return to the emergency department right away if any of these occur:  Trouble  breathing or swallowing  Fainting or loss of consciousness  Unusually fast heart rate  Vomiting blood  Large amounts of blood in stool  Date Last Reviewed: 6/22/2015  © 2011-6936 [employer]. [address] [place] [address], [place] [address]. All rights reserved. This information is not intended as a substitute for professional medical care. Always follow your healthcare professional's instructions.         Irritable Bowel Syndrome    Irritable bowel syndrome (IBS) is a disorder of the intestines. It is not a disease, but a group of symptoms caused by changes in the way the intestines work. It is fairly common, but the cause is not well understood.  Symptoms of IBS include:  Abdominal pain, discomfort, and cramping  Diarrhea  Constipation or dry, hard stools  Mucous stool  Bloating  Feeling of incomplete bowel movements  It usually results in one of 3 patterns of symptoms:  Chronic abdominal pain and constipation  Recurring episodes of diarrhea, with or without pain  Alternating diarrhea and constipation  Home care  The goal of treatment is to control and relieve your symptoms, so you can lead a full and active life. There is no cure for IBS. But it can be managed.  Diet  Your diet did not cause your IBS, but it can affect it. No one diet works for everyone. Finding the best foods for you may take trial and error. Keep a food log to help find what foods made your symptoms worse. Below are some tips that may help you.  Eat more slowly. Eat smaller amounts at a time, but more often. Remember, you can always eat more, but cannot eat less once youve eaten too much.  High-fiber foods are complicated. While they may help relieve constipation, they can make your bloating, cramping, gas, and diarrhea worse.  Eat less sugar.  Try cutting out dairy products. Sometimes this helps.  Try cutting out foods that are high in fat and fatty meats.  You can control bloating or passing excess gas. Be careful with gassy  vegetables and fruits like beans, cabbage, broccoli, and cauliflower.  Be careful with carbonated beverages and fruit juices. They can make your bloating and diarrhea worse.  Caffeine, alcohol, and stimulants can make symptoms worse. These include coffee, tea, sodas, energy drinks, and chocolate.  Lifestyle  Look for factors that seem to worsen your symptoms. These include stress and emotions.   Although stress does not cause IBS, it may trigger flare-ups. Counseling can help you learn to handle stress. So can self-help measures like exercise, yoga, and meditation.  Depression can occur along with IBS. Your healthcare provider may prescribe antidepressant medicine. This may help with diarrhea, constipation, and cramping, as well as with symptoms of depression.  Smoking doesn't cause IBS, but can make the symptoms worse.  Medicines  Your healthcare provider may prescribe medicines. Take them as directed. For acute flare-ups of your illness, your provider may give you prescription medicines.  Check with your healthcare provider before taking any medicines for diarrhea.  Avoid anti-inflammatory medicines like ibuprofen or naproxen.  Consider nutritional supplements. This is especially true if your diarrhea is prolonged, or you aren't eating or are losing weight  Follow-up care  Follow up with your healthcare provider, or as advised. If a stool sample was taken or cultures were done, you will be told if your treatment needs to change. You can call as directed for the results.  When to seek medical advice  Call your healthcare provider right away if any of these occur:  Abdominal pain gets worse  Constant abdominal pain moves to the right-lower abdomen  You can't keep liquids down because of vomiting  You have severe diarrhea  You have blood (red or black color) or mucus in your stool  You feel very weak or dizzy, faint, or have extreme thirst  You have a fever of 100.4ºF (38.0ºC) or higher, or as directed by your  healthcare provider  Date Last Reviewed: 8/31/2015  © 0297-8970 [employer]. [address] [place] [address], [place] [address]. All rights reserved. This information is not intended as a substitute for professional medical care. Always follow your healthcare professional's instructions.

## 2024-04-25 DIAGNOSIS — F98.8 ATTENTION DEFICIT DISORDER (ADD) WITHOUT HYPERACTIVITY: ICD-10-CM

## 2024-04-25 RX ORDER — DEXTROAMPHETAMINE SACCHARATE, AMPHETAMINE ASPARTATE MONOHYDRATE, DEXTROAMPHETAMINE SULFATE AND AMPHETAMINE SULFATE 3.75; 3.75; 3.75; 3.75 MG/1; MG/1; MG/1; MG/1
15 CAPSULE, EXTENDED RELEASE ORAL EVERY MORNING
Qty: 30 CAPSULE | Refills: 0 | Status: CANCELLED | OUTPATIENT
Start: 2024-04-25 | End: 2024-05-25

## 2024-04-26 ENCOUNTER — OFFICE VISIT (OUTPATIENT)
Dept: FAMILY MEDICINE | Facility: CLINIC | Age: 29
End: 2024-04-26
Payer: COMMERCIAL

## 2024-04-26 DIAGNOSIS — F98.8 ATTENTION DEFICIT DISORDER (ADD) WITHOUT HYPERACTIVITY: Primary | ICD-10-CM

## 2024-04-26 PROCEDURE — 1160F RVW MEDS BY RX/DR IN RCRD: CPT | Mod: CPTII,95,, | Performed by: PHYSICIAN ASSISTANT

## 2024-04-26 PROCEDURE — 3044F HG A1C LEVEL LT 7.0%: CPT | Mod: CPTII,95,, | Performed by: PHYSICIAN ASSISTANT

## 2024-04-26 PROCEDURE — 99213 OFFICE O/P EST LOW 20 MIN: CPT | Mod: 95,,, | Performed by: PHYSICIAN ASSISTANT

## 2024-04-26 PROCEDURE — 1159F MED LIST DOCD IN RCRD: CPT | Mod: CPTII,95,, | Performed by: PHYSICIAN ASSISTANT

## 2024-04-26 RX ORDER — DEXTROAMPHETAMINE SACCHARATE, AMPHETAMINE ASPARTATE MONOHYDRATE, DEXTROAMPHETAMINE SULFATE AND AMPHETAMINE SULFATE 5; 5; 5; 5 MG/1; MG/1; MG/1; MG/1
20 CAPSULE, EXTENDED RELEASE ORAL EVERY MORNING
Qty: 30 CAPSULE | Refills: 0 | Status: SHIPPED | OUTPATIENT
Start: 2024-04-26 | End: 2024-04-30

## 2024-04-26 RX ORDER — DEXTROAMPHETAMINE SACCHARATE, AMPHETAMINE ASPARTATE MONOHYDRATE, DEXTROAMPHETAMINE SULFATE AND AMPHETAMINE SULFATE 5; 5; 5; 5 MG/1; MG/1; MG/1; MG/1
20 CAPSULE, EXTENDED RELEASE ORAL EVERY MORNING
Qty: 30 CAPSULE | Refills: 0 | Status: SHIPPED | OUTPATIENT
Start: 2024-05-26 | End: 2024-04-30

## 2024-04-26 RX ORDER — DEXTROAMPHETAMINE SACCHARATE, AMPHETAMINE ASPARTATE MONOHYDRATE, DEXTROAMPHETAMINE SULFATE AND AMPHETAMINE SULFATE 5; 5; 5; 5 MG/1; MG/1; MG/1; MG/1
20 CAPSULE, EXTENDED RELEASE ORAL EVERY MORNING
Qty: 30 CAPSULE | Refills: 0 | Status: SHIPPED | OUTPATIENT
Start: 2024-06-25 | End: 2024-05-28

## 2024-04-26 NOTE — PROGRESS NOTES
Primary Care Telemedicine Note  The patient location is: Patient Home   The chief complaint leading to consultation is: Attention Deficit Disorder  Total time spent with patient: 10 minutes    Visit type: Virtual visit with synchronous audio only and video  Each patient to whom he or she provides medical services by telemedicine is: (1) informed of the relationship between the physician and patient and the respective role of any other health care provider with respect to management of the patient; and (2) notified that he or she may decline to receive medical services by telemedicine and may withdraw from such care at any time.      Assessment/Plan:    Problem List Items Addressed This Visit          Psychiatric    Attention deficit disorder (ADD) without hyperactivity - Primary    Overview     -therapist has conducted testing suggesting diagnosis of ADD  -now that depression/anxiety treatment established and stable, recently started on treatment for ADD  -remains on adderall XR to 15 mg with improvement in symptoms, but effects wearing off midday   -denies any AE with the medication thus far  -will increase adderall XR to 20 mg QD  -The patient was checked in the [facility]'s Prescription Monitoring Program. There appears to be no incongruities with the patient's verbalized history.           Relevant Medications    dextroamphetamine-amphetamine (ADDERALL XR) 20 MG 24 hr capsule    dextroamphetamine-amphetamine (ADDERALL XR) 20 MG 24 hr capsule (Start on 5/26/2024)    dextroamphetamine-amphetamine (ADDERALL XR) 20 MG 24 hr capsule (Start on 6/25/2024)       Follow up in about 3 months (around 7/26/2024), or if symptoms worsen or fail to improve, for Virtual Visit.    Brittani Urena PA-C  _____________________________________________________________________________________________________________________________________________________    CC: Attention Deficit Disorder    HPI: The patient is  being seen via telehealth today to discuss the chronic use of attention deficit medications. The patient is enrolled in the Telemedicine ADHD Program and receives 3/4 refills via Telehealth with 1/4 being in person. This visit is 1/3 of the telemedicine visits in that rotation. The patient has been on the medicine for the last 6 months and has been stable on the current dose of medicine during that time. The patient has been compliant on the medicine and is not receiving narcotics from any other physician. The  for the State of [place] has been viewed. The patient denies any complications from taking the medicine. The patient's weight and appetite has been stable and has not had difficulties with agitation. The patient reports improvement in the symptoms with the current dose, however, she stated that effects are wearing off midday. She is requesting to increase the dose for further improvement of symptoms. No other complaints today.      Past Medical History:   Diagnosis Date    Insulin resistance     PCOS (polycystic ovarian syndrome)     Thyroid disease     Hashimoto's     Past Surgical History:   Procedure Laterality Date    COLONOSCOPY N/A 12/06/2023    Procedure: COLONOSCOPY;  Surgeon: Ken Diaz Jr., MD;  Location: [facility];  Service: Endoscopy;  Laterality: N/A; ; Repeat colonoscopy at age 46 for screening    COLONOSCOPY N/A 12/04/2023    not completed.    ESOPHAGOGASTRODUODENOSCOPY N/A 01/24/2024    Procedure: EGD (ESOPHAGOGASTRODUODENOSCOPY);  Surgeon: Ken Diaz Jr., MD;  Location: [facility];  Service: Endoscopy;  Laterality: N/A;     Review of patient's allergies indicates:   Allergen Reactions    Grass pollen-sweet vernal grass, std. Itching and Tinitus    Ragweed Itching and Tinitus     Social History     Tobacco Use    Smoking status: Never   Substance Use Topics    Alcohol use: Yes     Comment: Socially only. Maybe 2x per month    Drug use: Never     Family History   Problem Relation  Name Age of Onset    Alcohol abuse Mother Nathalie     Cervical cancer Mother Nathalie     Colon cancer Neg Hx      Crohn's disease Neg Hx      Esophageal cancer Neg Hx      Ulcerative colitis Neg Hx      Stomach cancer Neg Hx       Current Outpatient Medications on File Prior to Visit   Medication Sig Dispense Refill    azelastine (ASTELIN) 137 mcg (0.1 %) nasal spray 1 spray (137 mcg total) by Nasal route 2 (two) times daily. for 7 days 30 mL 0    cetirizine (ZYRTEC) 10 MG tablet Take 1 tablet (10 mg total) by mouth once daily. 90 tablet 3    cycloSPORINE (RESTASIS) 0.05 % ophthalmic emulsion Place 1 drop into both eyes 2 (two) times daily.      desvenlafaxine succinate (PRISTIQ) 100 MG Tb24 Take 1 tablet (100 mg total) by mouth once daily. 90 tablet 3    dicyclomine (BENTYL) 10 MG capsule Take 1 capsule (10 mg total) by mouth 4 (four) times daily before meals and nightly. , to ease colon spasms and diarrhea. (Patient taking differently: Take 10 mg by mouth before meals and at bedtime as needed. , to ease colon spasms and diarrhea.) 120 capsule 11    hydrOXYzine pamoate (VISTARIL) 25 MG Cap Take 1 capsule (25 mg total) by mouth every 8 (eight) hours as needed (itching). 30 capsule 0    meloxicam (MOBIC) 15 MG tablet Take 1 tablet (15 mg total) by mouth once daily. 30 tablet 2    norethindrone-e.estradioL-iron (MINASTRIN 24 FE) 1 mg-20 mcg(24) /75 mg (4) Chew Take 1 tablet by mouth once daily.      ondansetron (ZOFRAN) 8 MG tablet TAKE 1 TABLET(8 MG) BY MOUTH EVERY 8 HOURS AS NEEDED 15 tablet 0    pantoprazole (PROTONIX) 20 MG tablet Take 1 tablet (20 mg total) by mouth once daily for 14 days, THEN 1 tablet (20 mg total) every other day for 14 days. Then discontinue thereafter.. 21 tablet 0    polyethylene glycol (GLYCOLAX) 17 gram/dose powder Take 17 g by mouth once daily. Mix with 8 oz of water/liquid. 510 g 2    triamcinolone acetonide 0.1% (KENALOG) 0.1 % cream Apply topically 2 (two) times daily. 453.6 g 0     No  current facility-administered medications on file prior to visit.       Review of Systems   Constitutional:  Negative for chills, fever and malaise/fatigue.   HENT:  Negative for hearing loss.    Eyes:  Negative for discharge.   Respiratory:  Negative for cough, shortness of breath and wheezing.    Cardiovascular:  Negative for chest pain, palpitations and leg swelling.   Gastrointestinal:  Positive for constipation. Negative for abdominal pain, blood in stool, diarrhea and vomiting.   Genitourinary:  Negative for dysuria, frequency and hematuria.   Musculoskeletal:  Negative for back pain, joint pain and neck pain.   Neurological:  Negative for weakness and headaches.   Endo/Heme/Allergies:  Negative for polydipsia.   Psychiatric/Behavioral:  Negative for depression. The patient is not nervous/anxious.            Physical Exam  Constitutional:       General: She is not in acute distress.     Appearance: She is well-developed.   HENT:      Head: Normocephalic and atraumatic.   Pulmonary:      Effort: Pulmonary effort is normal. No respiratory distress.   Abdominal:      General: There is no distension.   Musculoskeletal:         General: Normal range of motion.      Cervical back: Normal range of motion.   Neurological:      Mental Status: She is alert and oriented to person, place, and time.   Psychiatric:         Mood and Affect: Mood normal.         Behavior: Behavior normal.         The patient's Health Maintenance was reviewed and the following appears to be due at this time:  Health Maintenance Due   Topic Date Due    COVID-19 Vaccine (3 - 2023-24 season) 09/01/2023

## 2024-04-30 ENCOUNTER — PATIENT MESSAGE (OUTPATIENT)
Dept: FAMILY MEDICINE | Facility: CLINIC | Age: 29
End: 2024-04-30
Payer: COMMERCIAL

## 2024-04-30 ENCOUNTER — CLINICAL SUPPORT (OUTPATIENT)
Dept: ALLERGY | Facility: CLINIC | Age: 29
End: 2024-04-30
Payer: COMMERCIAL

## 2024-04-30 DIAGNOSIS — J30.9 CHRONIC ALLERGIC RHINITIS: Primary | ICD-10-CM

## 2024-04-30 DIAGNOSIS — F98.8 ATTENTION DEFICIT DISORDER (ADD) WITHOUT HYPERACTIVITY: Primary | ICD-10-CM

## 2024-04-30 PROCEDURE — 99999 PR PBB SHADOW E&M-EST. PATIENT-LVL II: CPT | Mod: PBBFAC,,,

## 2024-04-30 PROCEDURE — 95115 IMMUNOTHERAPY ONE INJECTION: CPT | Mod: S$GLB,,, | Performed by: STUDENT IN AN ORGANIZED HEALTH CARE EDUCATION/TRAINING PROGRAM

## 2024-04-30 RX ORDER — DEXTROAMPHETAMINE SACCHARATE, AMPHETAMINE ASPARTATE MONOHYDRATE, DEXTROAMPHETAMINE SULFATE AND AMPHETAMINE SULFATE 6.25; 6.25; 6.25; 6.25 MG/1; MG/1; MG/1; MG/1
25 CAPSULE, EXTENDED RELEASE ORAL EVERY MORNING
Qty: 30 CAPSULE | Refills: 0 | Status: SHIPPED | OUTPATIENT
Start: 2024-04-30 | End: 2024-06-01 | Stop reason: SDUPTHER

## 2024-04-30 RX ORDER — DEXTROAMPHETAMINE SACCHARATE, AMPHETAMINE ASPARTATE MONOHYDRATE, DEXTROAMPHETAMINE SULFATE AND AMPHETAMINE SULFATE 6.25; 6.25; 6.25; 6.25 MG/1; MG/1; MG/1; MG/1
25 CAPSULE, EXTENDED RELEASE ORAL EVERY MORNING
Qty: 30 CAPSULE | Refills: 0 | Status: CANCELLED | OUTPATIENT
Start: 2024-04-30 | End: 2024-05-30

## 2024-04-30 NOTE — TELEPHONE ENCOUNTER
I have signed for the following orders AND/OR meds.  Please call the patient and ask the patient to schedule the testing AND/OR inform about any medications that were sent. Medications have been sent to pharmacy listed below      No orders of the defined types were placed in this encounter.      Medications Ordered This Encounter   Medications    dextroamphetamine-amphetamine (ADDERALL XR) 25 MG 24 hr capsule     Sig: Take 1 capsule (25 mg total) by mouth every morning.     Dispense:  30 capsule     Refill:  0         [id number] - [place] - [address] AT [address]  [address] [address]  Phone: [phone] Fax: [phone]    [facility] [place], [place] - [address]  [address] [address]  Phone: [phone] Fax: [phone]    [id number] - [place] - [address] AT [facility] [address]  [address]  [place] [address]  Phone: [phone] Fax: [phone]

## 2024-04-30 NOTE — TELEPHONE ENCOUNTER
OV 4/26/24 with SOREN Peter:    Psychiatric      Attention deficit disorder (ADD) without hyperactivity - Primary     Overview       -therapist has conducted testing suggesting diagnosis of ADD  -now that depression/anxiety treatment established and stable, recently started on treatment for ADD  -remains on adderall XR to 15 mg with improvement in symptoms, but effects wearing off midday   -denies any AE with the medication thus far  -will increase adderall XR to 20 mg QD  -The patient was checked in the [place] Board of Pharmacy's Prescription Monitoring Program. There appears to be no incongruities with the patient's verbalized history.             Relevant Medications     dextroamphetamine-amphetamine (ADDERALL XR) 20 MG 24 hr capsule     dextroamphetamine-amphetamine (ADDERALL XR) 20 MG 24 hr capsule (Start on 5/26/2024)     dextroamphetamine-amphetamine (ADDERALL XR) 20 MG 24 hr capsule (Start on 6/25/2024)         Follow up in about 3 months (around 7/26/2024), or if symptoms worsen or fail to improve, for Virtual Visit.     Brittani Urena PA-C

## 2024-05-07 ENCOUNTER — CLINICAL SUPPORT (OUTPATIENT)
Dept: ALLERGY | Facility: CLINIC | Age: 29
End: 2024-05-07
Payer: COMMERCIAL

## 2024-05-07 DIAGNOSIS — J30.9 CHRONIC ALLERGIC RHINITIS: Primary | ICD-10-CM

## 2024-05-07 PROCEDURE — 95115 IMMUNOTHERAPY ONE INJECTION: CPT | Mod: S$GLB,,, | Performed by: FAMILY MEDICINE

## 2024-05-07 PROCEDURE — 99999 PR PBB SHADOW E&M-EST. PATIENT-LVL I: CPT | Mod: PBBFAC,,,

## 2024-05-14 ENCOUNTER — CLINICAL SUPPORT (OUTPATIENT)
Dept: ALLERGY | Facility: CLINIC | Age: 29
End: 2024-05-14
Payer: COMMERCIAL

## 2024-05-14 DIAGNOSIS — J30.9 CHRONIC ALLERGIC RHINITIS: Primary | ICD-10-CM

## 2024-05-14 PROCEDURE — 99999 PR PBB SHADOW E&M-EST. PATIENT-LVL I: CPT | Mod: PBBFAC,,,

## 2024-05-14 PROCEDURE — 95115 IMMUNOTHERAPY ONE INJECTION: CPT | Mod: S$GLB,,, | Performed by: STUDENT IN AN ORGANIZED HEALTH CARE EDUCATION/TRAINING PROGRAM

## 2024-05-20 ENCOUNTER — PATIENT MESSAGE (OUTPATIENT)
Dept: ENDOCRINOLOGY | Facility: CLINIC | Age: 29
End: 2024-05-20
Payer: COMMERCIAL

## 2024-05-20 DIAGNOSIS — R73.02 IGT (IMPAIRED GLUCOSE TOLERANCE): Primary | ICD-10-CM

## 2024-05-21 ENCOUNTER — CLINICAL SUPPORT (OUTPATIENT)
Dept: ALLERGY | Facility: CLINIC | Age: 29
End: 2024-05-21
Payer: COMMERCIAL

## 2024-05-21 DIAGNOSIS — J30.9 CHRONIC ALLERGIC RHINITIS: Primary | ICD-10-CM

## 2024-05-21 PROCEDURE — 95115 IMMUNOTHERAPY ONE INJECTION: CPT | Mod: S$GLB,,, | Performed by: STUDENT IN AN ORGANIZED HEALTH CARE EDUCATION/TRAINING PROGRAM

## 2024-05-21 PROCEDURE — 99999 PR PBB SHADOW E&M-EST. PATIENT-LVL I: CPT | Mod: PBBFAC,,,

## 2024-05-28 ENCOUNTER — CLINICAL SUPPORT (OUTPATIENT)
Dept: ALLERGY | Facility: CLINIC | Age: 29
End: 2024-05-28
Payer: COMMERCIAL

## 2024-05-28 ENCOUNTER — E-VISIT (OUTPATIENT)
Dept: ENDOCRINOLOGY | Facility: CLINIC | Age: 29
End: 2024-05-28
Payer: COMMERCIAL

## 2024-05-28 DIAGNOSIS — E88.819 INSULIN RESISTANCE: ICD-10-CM

## 2024-05-28 DIAGNOSIS — J30.9 CHRONIC ALLERGIC RHINITIS: Primary | ICD-10-CM

## 2024-05-28 DIAGNOSIS — E28.2 PCOS (POLYCYSTIC OVARIAN SYNDROME): Primary | ICD-10-CM

## 2024-05-28 PROCEDURE — 99421 OL DIG E/M SVC 5-10 MIN: CPT | Mod: ,,, | Performed by: INTERNAL MEDICINE

## 2024-05-28 PROCEDURE — 95115 IMMUNOTHERAPY ONE INJECTION: CPT | Mod: S$GLB,,, | Performed by: STUDENT IN AN ORGANIZED HEALTH CARE EDUCATION/TRAINING PROGRAM

## 2024-05-28 PROCEDURE — 99999 PR PBB SHADOW E&M-EST. PATIENT-LVL I: CPT | Mod: PBBFAC,,,

## 2024-05-28 RX ORDER — METFORMIN HYDROCHLORIDE 500 MG/1
500 TABLET, EXTENDED RELEASE ORAL 2 TIMES DAILY WITH MEALS
Qty: 180 TABLET | Refills: 3 | Status: SHIPPED | OUTPATIENT
Start: 2024-05-28 | End: 2025-05-28

## 2024-05-28 NOTE — PROGRESS NOTES
Patient ID: Alyson Vila is a 28 y.o. female.    Chief Complaint: Medication Management (Entered automatically based on patient selection in Patient Portal.)    The patient initiated a request through [facility] on 5/28/2024 for evaluation and management with a chief complaint of Medication Management (Entered automatically based on patient selection in Patient Portal.)     I evaluated the questionnaire submission on 5/28/24.    [facility] Peq Evisit Medication    5/28/2024  3:58 PM CDT - Filed by Patient   Do you agree to participate in an E-Visit? Yes   If you have any of the following symptoms, please present to your local emergency room or call 911:  I acknowledge   Medication requests for narcotics will not be addressed via an E-Visit.  Please schedule an appointment. I acknowledge   Choose the state of your primary residence [place]   Are you pregnant, could you be pregnant, or are you breast feeding? None of the above   Do you want to address a new or existing medication? I would like to start a new medication that I do not already take   What is the main issue you would like addressed today? Restarting metformin   What is the name of the medication that you would like to start? Metformin   Have you taken a similar medication in the past? Yes   What was the name of the similar medication? Metformin   Why are you no longer on that medication? Side effects   List the side effects you had with the medication: Diarrhea and nausea   Have you stopped taking the medicine? Yes   Are you still having side effects? No    What medical condition is the  medication intended to treat? Insulin resistance   Are you able to take your vital signs? No         Encounter Diagnoses   Name Primary?    PCOS (polycystic ovarian syndrome) Yes    Insulin resistance         No orders of the defined types were placed in this encounter.     Medications Ordered This Encounter   Medications    metFORMIN (GLUCOPHAGE-XR) 500 MG ER 24hr tablet      Sig: Take 1 tablet (500 mg total) by mouth 2 (two) times daily with meals.     Dispense:  180 tablet     Refill:  3        No follow-ups on file.      E-Visit Time Tracking:    Day 1 Time (in minutes): 5    Total Time (in minutes): 5

## 2024-05-29 ENCOUNTER — TELEPHONE (OUTPATIENT)
Dept: FAMILY MEDICINE | Facility: CLINIC | Age: 29
End: 2024-05-29
Payer: COMMERCIAL

## 2024-05-29 ENCOUNTER — NURSE TRIAGE (OUTPATIENT)
Dept: ADMINISTRATIVE | Facility: CLINIC | Age: 29
End: 2024-05-29
Payer: COMMERCIAL

## 2024-05-29 NOTE — TELEPHONE ENCOUNTER
----- Message from Carole Patton MA sent at 5/29/2024  2:10 PM CDT -----  Type:  Patient Returning Call    Who Called:  Pt   Who Left Message for Patient:  Kadie Torres  Does the patient know what this is regarding?:    Would the patient rather a call back or a response via [facility]?  Call back    Best Call Back Number:  [phone]  Additional Information:    Thank you.

## 2024-05-29 NOTE — TELEPHONE ENCOUNTER
Spoke with patient regarding blood glucose concerns. Patient called triage nurse and is currently in route to the emergency room per instruction from triage nurse.

## 2024-05-29 NOTE — TELEPHONE ENCOUNTER
"Call attempted. Pt did not answer. Left detailed msg with instructions as per dr carter:     "Please call and tell her I recommend she stay well hydrated and avoid high carbohydrate food and drinks for today. She should start metformin as prescribed as this will help prevent future spikes."    "

## 2024-05-29 NOTE — TELEPHONE ENCOUNTER
Spoke with pt. who has PCOS insulin resistance. She is seen by endocrinologist. States that her glucose now is 212. reports having brain fog, feeling flushed.  Pt was just placed on metformin yesterday. ED/UC dispo or office with approval.     Secure chat message sent to Shanique Mcmahon PA-C who advised for pt to be seen in ED if symptoms are severe. Also to reach out to endocrinologist to discuss symptoms    Pt informed, and verbalized understanding.    Reason for Disposition   Patient sounds very sick or weak to the triager    Additional Information   Negative: Unconscious or difficult to awaken   Negative: Acting confused (e.g., disoriented, slurred speech)   Negative: Very weak (can't stand)   Negative: Sounds like a life-threatening emergency to the triager   Negative: Vomiting and signs of dehydration (e.g., very dry mouth, lightheaded, dark urine)   Negative: Blood glucose > 240 mg/dL (13.3 mmol/L) and rapid breathing   Negative: Blood glucose > 500 mg/dL (27.8 mmol/L)   Negative: Blood glucose > 240 mg/dL (13.3 mmol/L) AND urine ketones moderate-large (or more than 1+)   Negative: Blood glucose > 240 mg/dL (13.3 mmol/L) and blood ketones > 1.4 mmol/L   Negative: Blood glucose > 240 mg/dL (13.3 mmol/L) AND vomiting AND unable to check for ketones (in blood or urine)   Negative: Vomiting lasting > 4 hours    Protocols used: Diabetes - High Blood Sugar-A-OH

## 2024-05-29 NOTE — TELEPHONE ENCOUNTER
----- Message from Ivanna Miranda sent at 5/29/2024  1:47 PM CDT -----  Contact: self  [phone]  Patient called in this afternoon with a high blood glucose 244 after not eating the day before/brain fog headache /ate lunch @12:00 and sugar level 277. Please call back 253

## 2024-06-01 DIAGNOSIS — F98.8 ATTENTION DEFICIT DISORDER (ADD) WITHOUT HYPERACTIVITY: ICD-10-CM

## 2024-06-03 RX ORDER — DEXTROAMPHETAMINE SACCHARATE, AMPHETAMINE ASPARTATE MONOHYDRATE, DEXTROAMPHETAMINE SULFATE AND AMPHETAMINE SULFATE 6.25; 6.25; 6.25; 6.25 MG/1; MG/1; MG/1; MG/1
25 CAPSULE, EXTENDED RELEASE ORAL EVERY MORNING
Qty: 30 CAPSULE | Refills: 0 | Status: SHIPPED | OUTPATIENT
Start: 2024-06-03 | End: 2024-07-03

## 2024-06-04 ENCOUNTER — CLINICAL SUPPORT (OUTPATIENT)
Dept: ALLERGY | Facility: CLINIC | Age: 29
End: 2024-06-04
Payer: COMMERCIAL

## 2024-06-04 DIAGNOSIS — J30.9 CHRONIC ALLERGIC RHINITIS: Primary | ICD-10-CM

## 2024-06-04 PROCEDURE — 95115 IMMUNOTHERAPY ONE INJECTION: CPT | Mod: S$GLB,,, | Performed by: STUDENT IN AN ORGANIZED HEALTH CARE EDUCATION/TRAINING PROGRAM

## 2024-06-04 PROCEDURE — 99999 PR PBB SHADOW E&M-EST. PATIENT-LVL I: CPT | Mod: PBBFAC,,,

## 2024-06-11 ENCOUNTER — CLINICAL SUPPORT (OUTPATIENT)
Dept: ALLERGY | Facility: CLINIC | Age: 29
End: 2024-06-11
Payer: COMMERCIAL

## 2024-06-11 DIAGNOSIS — J30.9 CHRONIC ALLERGIC RHINITIS: Primary | ICD-10-CM

## 2024-06-11 PROCEDURE — 95115 IMMUNOTHERAPY ONE INJECTION: CPT | Mod: S$GLB,,, | Performed by: FAMILY MEDICINE

## 2024-06-11 PROCEDURE — 99999 PR PBB SHADOW E&M-EST. PATIENT-LVL I: CPT | Mod: PBBFAC,,,

## 2024-06-18 ENCOUNTER — TELEPHONE (OUTPATIENT)
Dept: ALLERGY | Facility: CLINIC | Age: 29
End: 2024-06-18
Payer: COMMERCIAL

## 2024-06-18 NOTE — TELEPHONE ENCOUNTER
----- Message from Gadiel Ortiz LPN sent at 6/17/2024 10:03 AM CDT -----  Regarding: FW: Reschedule Appt    ----- Message -----  From: Gregory Salcido  Sent: 6/17/2024   9:08 AM CDT  To: [facility] Injection Nurse  Subject: Reschedule Appt                                  Type:  Reschedule Appointment Request    Caller is requesting to reschedule appointment.      Name of Caller:PT    Date of previous appointment?6/18    Symptoms:injection    Would the patient rather a call back or a response via [facility]? Call back    Best Call Back Number:[phone]      Additional Information: Sts she needs a call back to reschedule.   Please advise -- Thank you

## 2024-06-18 NOTE — TELEPHONE ENCOUNTER
Returned patient's call, no answer. Left voicemail message for patient to call back or send message through patient portal and let me know what date/time she'd like to reschedule for.

## 2024-06-20 ENCOUNTER — CLINICAL SUPPORT (OUTPATIENT)
Dept: ALLERGY | Facility: CLINIC | Age: 29
End: 2024-06-20
Payer: COMMERCIAL

## 2024-06-20 DIAGNOSIS — J30.9 CHRONIC ALLERGIC RHINITIS: Primary | ICD-10-CM

## 2024-06-20 PROCEDURE — 95115 IMMUNOTHERAPY ONE INJECTION: CPT | Mod: S$GLB,,, | Performed by: STUDENT IN AN ORGANIZED HEALTH CARE EDUCATION/TRAINING PROGRAM

## 2024-06-20 PROCEDURE — 99999 PR PBB SHADOW E&M-EST. PATIENT-LVL I: CPT | Mod: PBBFAC,,,

## 2024-06-25 ENCOUNTER — CLINICAL SUPPORT (OUTPATIENT)
Dept: ALLERGY | Facility: CLINIC | Age: 29
End: 2024-06-25
Payer: COMMERCIAL

## 2024-06-25 DIAGNOSIS — J30.9 CHRONIC ALLERGIC RHINITIS: Primary | ICD-10-CM

## 2024-06-25 PROCEDURE — 95115 IMMUNOTHERAPY ONE INJECTION: CPT | Mod: S$GLB,,, | Performed by: STUDENT IN AN ORGANIZED HEALTH CARE EDUCATION/TRAINING PROGRAM

## 2024-06-25 PROCEDURE — 99999 PR PBB SHADOW E&M-EST. PATIENT-LVL I: CPT | Mod: PBBFAC,,,

## 2024-07-08 ENCOUNTER — PATIENT MESSAGE (OUTPATIENT)
Dept: ALLERGY | Facility: CLINIC | Age: 29
End: 2024-07-08
Payer: COMMERCIAL

## 2024-07-08 DIAGNOSIS — M25.531 PAIN IN BOTH WRISTS: ICD-10-CM

## 2024-07-08 DIAGNOSIS — F98.8 ATTENTION DEFICIT DISORDER (ADD) WITHOUT HYPERACTIVITY: ICD-10-CM

## 2024-07-08 DIAGNOSIS — M25.532 PAIN IN BOTH WRISTS: ICD-10-CM

## 2024-07-08 RX ORDER — MELOXICAM 15 MG/1
15 TABLET ORAL DAILY
Qty: 30 TABLET | Refills: 2 | Status: SHIPPED | OUTPATIENT
Start: 2024-07-08

## 2024-07-08 RX ORDER — DEXTROAMPHETAMINE SACCHARATE, AMPHETAMINE ASPARTATE MONOHYDRATE, DEXTROAMPHETAMINE SULFATE AND AMPHETAMINE SULFATE 6.25; 6.25; 6.25; 6.25 MG/1; MG/1; MG/1; MG/1
25 CAPSULE, EXTENDED RELEASE ORAL EVERY MORNING
Qty: 30 CAPSULE | Refills: 0 | Status: SHIPPED | OUTPATIENT
Start: 2024-07-08 | End: 2024-08-07

## 2024-07-23 ENCOUNTER — CLINICAL SUPPORT (OUTPATIENT)
Dept: ALLERGY | Facility: CLINIC | Age: 29
End: 2024-07-23
Payer: COMMERCIAL

## 2024-07-23 DIAGNOSIS — J30.9 CHRONIC ALLERGIC RHINITIS: Primary | ICD-10-CM

## 2024-07-23 PROCEDURE — 95115 IMMUNOTHERAPY ONE INJECTION: CPT | Mod: S$GLB,,, | Performed by: STUDENT IN AN ORGANIZED HEALTH CARE EDUCATION/TRAINING PROGRAM

## 2024-07-23 PROCEDURE — 99999 PR PBB SHADOW E&M-EST. PATIENT-LVL I: CPT | Mod: PBBFAC,,,

## 2024-07-30 ENCOUNTER — CLINICAL SUPPORT (OUTPATIENT)
Dept: ALLERGY | Facility: CLINIC | Age: 29
End: 2024-07-30
Payer: COMMERCIAL

## 2024-07-30 DIAGNOSIS — J30.9 CHRONIC ALLERGIC RHINITIS: Primary | ICD-10-CM

## 2024-07-30 PROCEDURE — 99999 PR PBB SHADOW E&M-EST. PATIENT-LVL I: CPT | Mod: PBBFAC,,,

## 2024-07-30 PROCEDURE — 95115 IMMUNOTHERAPY ONE INJECTION: CPT | Mod: S$GLB,,, | Performed by: STUDENT IN AN ORGANIZED HEALTH CARE EDUCATION/TRAINING PROGRAM

## 2024-08-06 ENCOUNTER — CLINICAL SUPPORT (OUTPATIENT)
Dept: ALLERGY | Facility: CLINIC | Age: 29
End: 2024-08-06
Payer: COMMERCIAL

## 2024-08-06 DIAGNOSIS — J30.9 CHRONIC ALLERGIC RHINITIS: Primary | ICD-10-CM

## 2024-08-06 PROCEDURE — 99999 PR PBB SHADOW E&M-EST. PATIENT-LVL I: CPT | Mod: PBBFAC,,,

## 2024-08-06 PROCEDURE — 95115 IMMUNOTHERAPY ONE INJECTION: CPT | Mod: S$GLB,,, | Performed by: STUDENT IN AN ORGANIZED HEALTH CARE EDUCATION/TRAINING PROGRAM

## 2024-08-08 ENCOUNTER — HOSPITAL ENCOUNTER (OUTPATIENT)
Dept: RADIOLOGY | Facility: HOSPITAL | Age: 29
Discharge: HOME | End: 2024-08-08
Attending: INTERNAL MEDICINE
Payer: COMMERCIAL

## 2024-08-08 ENCOUNTER — OFFICE VISIT (OUTPATIENT)
Dept: FAMILY MEDICINE | Facility: CLINIC | Age: 29
End: 2024-08-08
Payer: COMMERCIAL

## 2024-08-08 VITALS
HEART RATE: 98 BPM | TEMPERATURE: 98 F | BODY MASS INDEX: 38.64 KG/M2 | SYSTOLIC BLOOD PRESSURE: 117 MMHG | WEIGHT: 210 LBS | DIASTOLIC BLOOD PRESSURE: 78 MMHG | HEIGHT: 62 IN

## 2024-08-08 DIAGNOSIS — E66.01 SEVERE OBESITY (BMI 35.0-39.9) WITH COMORBIDITY: ICD-10-CM

## 2024-08-08 DIAGNOSIS — F33.0 MILD EPISODE OF RECURRENT MAJOR DEPRESSIVE DISORDER: ICD-10-CM

## 2024-08-08 DIAGNOSIS — F98.8 ATTENTION DEFICIT DISORDER (ADD) WITHOUT HYPERACTIVITY: ICD-10-CM

## 2024-08-08 DIAGNOSIS — G89.29 CHRONIC BILATERAL LOW BACK PAIN WITHOUT SCIATICA: Primary | ICD-10-CM

## 2024-08-08 DIAGNOSIS — M54.50 CHRONIC BILATERAL LOW BACK PAIN WITHOUT SCIATICA: ICD-10-CM

## 2024-08-08 DIAGNOSIS — M54.50 CHRONIC BILATERAL LOW BACK PAIN WITHOUT SCIATICA: Primary | ICD-10-CM

## 2024-08-08 DIAGNOSIS — G89.29 CHRONIC BILATERAL LOW BACK PAIN WITHOUT SCIATICA: ICD-10-CM

## 2024-08-08 PROCEDURE — 3074F SYST BP LT 130 MM HG: CPT | Mod: CPTII,S$GLB,, | Performed by: INTERNAL MEDICINE

## 2024-08-08 PROCEDURE — 3078F DIAST BP <80 MM HG: CPT | Mod: CPTII,S$GLB,, | Performed by: INTERNAL MEDICINE

## 2024-08-08 PROCEDURE — 72110 X-RAY EXAM L-2 SPINE 4/>VWS: CPT | Mod: TC,PO

## 2024-08-08 PROCEDURE — 99214 OFFICE O/P EST MOD 30 MIN: CPT | Mod: S$GLB,,, | Performed by: INTERNAL MEDICINE

## 2024-08-08 PROCEDURE — 1159F MED LIST DOCD IN RCRD: CPT | Mod: CPTII,S$GLB,, | Performed by: INTERNAL MEDICINE

## 2024-08-08 PROCEDURE — 3044F HG A1C LEVEL LT 7.0%: CPT | Mod: CPTII,S$GLB,, | Performed by: INTERNAL MEDICINE

## 2024-08-08 PROCEDURE — 3008F BODY MASS INDEX DOCD: CPT | Mod: CPTII,S$GLB,, | Performed by: INTERNAL MEDICINE

## 2024-08-08 PROCEDURE — 72110 X-RAY EXAM L-2 SPINE 4/>VWS: CPT | Mod: 26,,, | Performed by: RADIOLOGY

## 2024-08-08 PROCEDURE — 72220 X-RAY EXAM SACRUM TAILBONE: CPT | Mod: TC,PO

## 2024-08-08 PROCEDURE — 72220 X-RAY EXAM SACRUM TAILBONE: CPT | Mod: 26,,, | Performed by: RADIOLOGY

## 2024-08-08 PROCEDURE — 99999 PR PBB SHADOW E&M-EST. PATIENT-LVL IV: CPT | Mod: PBBFAC,,, | Performed by: INTERNAL MEDICINE

## 2024-08-08 RX ORDER — DEXTROAMPHETAMINE SACCHARATE, AMPHETAMINE ASPARTATE MONOHYDRATE, DEXTROAMPHETAMINE SULFATE AND AMPHETAMINE SULFATE 6.25; 6.25; 6.25; 6.25 MG/1; MG/1; MG/1; MG/1
25 CAPSULE, EXTENDED RELEASE ORAL EVERY MORNING
Qty: 30 CAPSULE | Refills: 0 | Status: SHIPPED | OUTPATIENT
Start: 2024-08-08 | End: 2024-09-07

## 2024-08-08 RX ORDER — DEXTROAMPHETAMINE SACCHARATE, AMPHETAMINE ASPARTATE MONOHYDRATE, DEXTROAMPHETAMINE SULFATE AND AMPHETAMINE SULFATE 6.25; 6.25; 6.25; 6.25 MG/1; MG/1; MG/1; MG/1
25 CAPSULE, EXTENDED RELEASE ORAL EVERY MORNING
Qty: 30 CAPSULE | Refills: 0 | Status: SHIPPED | OUTPATIENT
Start: 2024-09-07 | End: 2024-10-07

## 2024-08-08 RX ORDER — DEXTROAMPHETAMINE SACCHARATE, AMPHETAMINE ASPARTATE MONOHYDRATE, DEXTROAMPHETAMINE SULFATE AND AMPHETAMINE SULFATE 6.25; 6.25; 6.25; 6.25 MG/1; MG/1; MG/1; MG/1
25 CAPSULE, EXTENDED RELEASE ORAL EVERY MORNING
Qty: 30 CAPSULE | Refills: 0 | Status: SHIPPED | OUTPATIENT
Start: 2024-10-07 | End: 2024-11-06

## 2024-08-13 ENCOUNTER — CLINICAL SUPPORT (OUTPATIENT)
Dept: ALLERGY | Facility: CLINIC | Age: 29
End: 2024-08-13
Payer: COMMERCIAL

## 2024-08-13 DIAGNOSIS — J30.9 CHRONIC ALLERGIC RHINITIS: Primary | ICD-10-CM

## 2024-08-13 PROCEDURE — 99999 PR PBB SHADOW E&M-EST. PATIENT-LVL I: CPT | Mod: PBBFAC,,,

## 2024-08-13 PROCEDURE — 95115 IMMUNOTHERAPY ONE INJECTION: CPT | Mod: S$GLB,,, | Performed by: STUDENT IN AN ORGANIZED HEALTH CARE EDUCATION/TRAINING PROGRAM

## 2024-08-20 ENCOUNTER — CLINICAL SUPPORT (OUTPATIENT)
Dept: ALLERGY | Facility: CLINIC | Age: 29
End: 2024-08-20
Payer: COMMERCIAL

## 2024-08-20 DIAGNOSIS — J30.9 CHRONIC ALLERGIC RHINITIS: Primary | ICD-10-CM

## 2024-08-20 PROCEDURE — 99999 PR PBB SHADOW E&M-EST. PATIENT-LVL I: CPT | Mod: PBBFAC,,,

## 2024-08-20 PROCEDURE — 95115 IMMUNOTHERAPY ONE INJECTION: CPT | Mod: S$GLB,,, | Performed by: FAMILY MEDICINE

## 2024-08-27 ENCOUNTER — CLINICAL SUPPORT (OUTPATIENT)
Dept: ALLERGY | Facility: CLINIC | Age: 29
End: 2024-08-27
Payer: COMMERCIAL

## 2024-08-27 DIAGNOSIS — J30.9 CHRONIC ALLERGIC RHINITIS: Primary | ICD-10-CM

## 2024-08-27 PROCEDURE — 99999 PR PBB SHADOW E&M-EST. PATIENT-LVL I: CPT | Mod: PBBFAC,,,

## 2024-08-27 PROCEDURE — 95115 IMMUNOTHERAPY ONE INJECTION: CPT | Mod: S$GLB,,, | Performed by: FAMILY MEDICINE

## 2024-09-03 ENCOUNTER — CLINICAL SUPPORT (OUTPATIENT)
Dept: ALLERGY | Facility: CLINIC | Age: 29
End: 2024-09-03
Payer: COMMERCIAL

## 2024-09-03 DIAGNOSIS — J30.9 CHRONIC ALLERGIC RHINITIS: Primary | ICD-10-CM

## 2024-09-03 PROCEDURE — 99999 PR PBB SHADOW E&M-EST. PATIENT-LVL I: CPT | Mod: PBBFAC,,,

## 2024-09-03 PROCEDURE — 95115 IMMUNOTHERAPY ONE INJECTION: CPT | Mod: S$GLB,,, | Performed by: STUDENT IN AN ORGANIZED HEALTH CARE EDUCATION/TRAINING PROGRAM

## 2024-09-10 ENCOUNTER — CLINICAL SUPPORT (OUTPATIENT)
Dept: ALLERGY | Facility: CLINIC | Age: 29
End: 2024-09-10
Payer: COMMERCIAL

## 2024-09-10 DIAGNOSIS — J30.9 CHRONIC ALLERGIC RHINITIS: Primary | ICD-10-CM

## 2024-09-10 PROCEDURE — 95115 IMMUNOTHERAPY ONE INJECTION: CPT | Mod: S$GLB,,, | Performed by: STUDENT IN AN ORGANIZED HEALTH CARE EDUCATION/TRAINING PROGRAM

## 2024-09-10 PROCEDURE — 99999 PR PBB SHADOW E&M-EST. PATIENT-LVL I: CPT | Mod: PBBFAC,,,

## 2024-09-17 ENCOUNTER — CLINICAL SUPPORT (OUTPATIENT)
Dept: ALLERGY | Facility: CLINIC | Age: 29
End: 2024-09-17
Payer: COMMERCIAL

## 2024-09-17 DIAGNOSIS — J30.9 CHRONIC ALLERGIC RHINITIS: Primary | ICD-10-CM

## 2024-09-17 PROCEDURE — 99999 PR PBB SHADOW E&M-EST. PATIENT-LVL I: CPT | Mod: PBBFAC,,,

## 2024-09-17 PROCEDURE — 95115 IMMUNOTHERAPY ONE INJECTION: CPT | Mod: S$GLB,,, | Performed by: STUDENT IN AN ORGANIZED HEALTH CARE EDUCATION/TRAINING PROGRAM

## 2024-09-24 ENCOUNTER — CLINICAL SUPPORT (OUTPATIENT)
Dept: ALLERGY | Facility: CLINIC | Age: 29
End: 2024-09-24
Payer: COMMERCIAL

## 2024-09-24 DIAGNOSIS — J30.9 CHRONIC ALLERGIC RHINITIS: Primary | ICD-10-CM

## 2024-09-24 PROCEDURE — 99999 PR PBB SHADOW E&M-EST. PATIENT-LVL I: CPT | Mod: PBBFAC,,,

## 2024-09-24 PROCEDURE — 95115 IMMUNOTHERAPY ONE INJECTION: CPT | Mod: S$GLB,,, | Performed by: STUDENT IN AN ORGANIZED HEALTH CARE EDUCATION/TRAINING PROGRAM

## 2024-09-25 ENCOUNTER — OFFICE VISIT (OUTPATIENT)
Dept: PRIMARY CARE CLINIC | Facility: CLINIC | Age: 29
End: 2024-09-25
Payer: COMMERCIAL

## 2024-09-25 VITALS
HEIGHT: 62 IN | SYSTOLIC BLOOD PRESSURE: 118 MMHG | BODY MASS INDEX: 39.09 KG/M2 | WEIGHT: 212.44 LBS | OXYGEN SATURATION: 100 % | DIASTOLIC BLOOD PRESSURE: 78 MMHG | HEART RATE: 102 BPM | TEMPERATURE: 99 F

## 2024-09-25 DIAGNOSIS — F33.0 MILD EPISODE OF RECURRENT MAJOR DEPRESSIVE DISORDER: ICD-10-CM

## 2024-09-25 DIAGNOSIS — Z13.6 ENCOUNTER FOR LIPID SCREENING FOR CARDIOVASCULAR DISEASE: ICD-10-CM

## 2024-09-25 DIAGNOSIS — Z00.00 ENCOUNTER FOR ANNUAL HEALTH EXAMINATION: Primary | ICD-10-CM

## 2024-09-25 DIAGNOSIS — Z13.220 ENCOUNTER FOR LIPID SCREENING FOR CARDIOVASCULAR DISEASE: ICD-10-CM

## 2024-09-25 DIAGNOSIS — R53.82 CHRONIC FATIGUE: ICD-10-CM

## 2024-09-25 DIAGNOSIS — E04.2 MULTIPLE THYROID NODULES: ICD-10-CM

## 2024-09-25 PROCEDURE — 3074F SYST BP LT 130 MM HG: CPT | Mod: CPTII,S$GLB,, | Performed by: INTERNAL MEDICINE

## 2024-09-25 PROCEDURE — 3044F HG A1C LEVEL LT 7.0%: CPT | Mod: CPTII,S$GLB,, | Performed by: INTERNAL MEDICINE

## 2024-09-25 PROCEDURE — 1159F MED LIST DOCD IN RCRD: CPT | Mod: CPTII,S$GLB,, | Performed by: INTERNAL MEDICINE

## 2024-09-25 PROCEDURE — 99214 OFFICE O/P EST MOD 30 MIN: CPT | Mod: S$GLB,,, | Performed by: INTERNAL MEDICINE

## 2024-09-25 PROCEDURE — 99999 PR PBB SHADOW E&M-EST. PATIENT-LVL IV: CPT | Mod: PBBFAC,,, | Performed by: INTERNAL MEDICINE

## 2024-09-25 PROCEDURE — 3078F DIAST BP <80 MM HG: CPT | Mod: CPTII,S$GLB,, | Performed by: INTERNAL MEDICINE

## 2024-09-25 PROCEDURE — 3008F BODY MASS INDEX DOCD: CPT | Mod: CPTII,S$GLB,, | Performed by: INTERNAL MEDICINE

## 2024-09-25 RX ORDER — DULOXETIN HYDROCHLORIDE 60 MG/1
60 CAPSULE, DELAYED RELEASE ORAL DAILY
Qty: 30 CAPSULE | Refills: 1 | Status: SHIPPED | OUTPATIENT
Start: 2024-09-25 | End: 2024-11-24

## 2024-09-25 NOTE — PROGRESS NOTES
Assessment/Plan:    Problem List Items Addressed This Visit          Psychiatric    Mild episode of recurrent major depressive disorder    Overview     -chronic  -currently on pristiq 100 mg QD with improvement of symptoms and without AE  -of note, she has been on zoloft, wellbutrin, prozac in the past with AE  -also checking labs, including TSH, to rule out other causes of worsening depression/anxiety and fatigue  -also discussed considering sleep study to rule out HILDA as cause of fatigue if labs are normal  -discussed risk of discontinuing this medication without tapering  -patient was educated, advised of side effects, and all questions were answered.  Patient voiced understanding  -patient will follow up routinely and notify us if having any side effects or worsening or persistent symptoms.  ER precautions were given.           Relevant Medications    DULoxetine (CYMBALTA) 60 MG capsule       Endocrine    Multiple thyroid nodules    Overview     -US Nov 2022- multiple nodules, none meeting FNA criteria  -thyroid studies pending         Relevant Orders    US Thyroid      Encounter for lipid screening for cardiovascular disease        Relevant Orders    Lipid Panel    Chronic fatigue        Relevant Orders    TSH    Iron and TIBC    Ferritin    Vitamin B12    Vitamin D            Follow up in about 4 weeks (around 10/23/2024) for virtual f/u me.    Irasema Jaquez MD  _____________________________________________________________________________________________________________________________________________________    CC: depression/fatigue    Patient is in clinic today as an established patient.    History of Present Illness  The patient is a 28-year-old female here to discuss depression medication and fatigue.    She reports that her current medication, Pristiq, initially provided relief but has recently become less effective. She is experiencing increased isolation, loss of interest in activities she previously  enjoyed, and a lack of motivation. She also mentions a decrease in social interactions with friends and family. She is currently on a 100 mg dose of Pristiq. She has tried Zoloft, Wellbutrin, and Prozac in the past but has not been on Lexapro or Cymbalta.    She has a history of fatigue, which she attributes to her depression. Despite being on Adderall, she only experiences a brief period of increased energy before feeling tired again. She often feels the need to nap and avoids watching TV as it tends to make her fall asleep. She lives alone and is unsure if she snores, but recalls being told she did in the past.    She is curious if hormonal changes could be contributing to her symptoms. She is currently on birth control for Polycystic Ovary Syndrome (PCOS) and notes that missing a day of her birth control pills results in an immediate onset of her menstrual cycle.    Her last thyroid ultrasound was conducted in November 2023. There was a slight enlargement of the thyroid nodule that is known to be present.    No other new complaints today.     Current Outpatient Medications on File Prior to Visit   Medication Sig Dispense Refill    azelastine (ASTELIN) 137 mcg (0.1 %) nasal spray 1 spray (137 mcg total) by Nasal route 2 (two) times daily. for 7 days 30 mL 0    cetirizine (ZYRTEC) 10 MG tablet Take 1 tablet (10 mg total) by mouth once daily. 90 tablet 3    cycloSPORINE (RESTASIS) 0.05 % ophthalmic emulsion Place 1 drop into both eyes 2 (two) times daily.      dextroamphetamine-amphetamine (ADDERALL XR) 25 MG 24 hr capsule Take 1 capsule (25 mg total) by mouth every morning. 30 capsule 0    [START ON 10/7/2024] dextroamphetamine-amphetamine (ADDERALL XR) 25 MG 24 hr capsule Take 1 capsule (25 mg total) by mouth every morning. 30 capsule 0    dicyclomine (BENTYL) 10 MG capsule Take 1 capsule (10 mg total) by mouth 4 (four) times daily before meals and nightly. , to ease colon spasms and diarrhea. (Patient  taking differently: Take 10 mg by mouth before meals and at bedtime as needed. , to ease colon spasms and diarrhea.) 120 capsule 11    hydrOXYzine pamoate (VISTARIL) 25 MG Cap Take 1 capsule (25 mg total) by mouth every 8 (eight) hours as needed (itching). 30 capsule 0    meloxicam (MOBIC) 15 MG tablet Take 1 tablet (15 mg total) by mouth once daily. 30 tablet 2    metFORMIN (GLUCOPHAGE-XR) 500 MG ER 24hr tablet Take 1 tablet (500 mg total) by mouth 2 (two) times daily with meals. 180 tablet 3    norethindrone-e.estradioL-iron (MINASTRIN 24 FE) 1 mg-20 mcg(24) /75 mg (4) Chew Take 1 tablet by mouth once daily.      ondansetron (ZOFRAN) 8 MG tablet TAKE 1 TABLET(8 MG) BY MOUTH EVERY 8 HOURS AS NEEDED 15 tablet 0    triamcinolone acetonide 0.1% (KENALOG) 0.1 % cream Apply topically 2 (two) times daily. 453.6 g 0    [DISCONTINUED] desvenlafaxine succinate (PRISTIQ) 100 MG Tb24 Take 1 tablet (100 mg total) by mouth once daily. 90 tablet 3     No current facility-administered medications on file prior to visit.       Review of Systems   Constitutional:  Positive for activity change and fatigue. Negative for chills, diaphoresis, fever and unexpected weight change.   HENT:  Negative for congestion, ear pain, hearing loss, postnasal drip, rhinorrhea, sinus pain, sore throat and trouble swallowing.    Eyes:  Negative for pain, discharge, redness and visual disturbance.   Respiratory:  Negative for cough, chest tightness, shortness of breath and wheezing.    Cardiovascular:  Negative for chest pain, palpitations and leg swelling.   Gastrointestinal:  Negative for abdominal pain, blood in stool, constipation, diarrhea, nausea and vomiting.   Endocrine: Negative for polydipsia and polyuria.   Genitourinary:  Negative for difficulty urinating, dysuria, hematuria and menstrual problem.   Musculoskeletal:  Negative for arthralgias, joint swelling and neck pain.   Skin:  Negative for rash.   Neurological:  Positive for  "headaches. Negative for dizziness, syncope and weakness.   Psychiatric/Behavioral:  Positive for dysphoric mood. Negative for confusion, self-injury, sleep disturbance and suicidal ideas. The patient is not nervous/anxious.      Vitals:    09/25/24 1023   BP: 118/78   Pulse: 102   Temp: 98.6 °F (37 °C)   TempSrc: Temporal   SpO2: 100%   Weight: 96.3 kg (212 lb 6.6 oz)   Height: 5' 2" (1.575 m)       Wt Readings from Last 3 Encounters:   09/25/24 96.3 kg (212 lb 6.6 oz)   08/08/24 95.3 kg (210 lb)   04/24/24 93.7 kg (206 lb 9.1 oz)       Physical Exam  Constitutional:       General: She is not in acute distress.     Appearance: Normal appearance. She is well-developed. She is obese.   HENT:      Head: Normocephalic and atraumatic.   Eyes:      Conjunctiva/sclera: Conjunctivae normal.   Cardiovascular:      Rate and Rhythm: Normal rate and regular rhythm.      Pulses: Normal pulses.      Heart sounds: Normal heart sounds. No murmur heard.  Pulmonary:      Effort: Pulmonary effort is normal. No respiratory distress.      Breath sounds: Normal breath sounds.   Abdominal:      General: Bowel sounds are normal. There is no distension.      Palpations: Abdomen is soft.      Tenderness: There is no abdominal tenderness.   Musculoskeletal:         General: Normal range of motion.      Cervical back: Normal range of motion and neck supple.   Skin:     General: Skin is warm and dry.      Findings: No rash.   Neurological:      General: No focal deficit present.      Mental Status: She is alert and oriented to person, place, and time.   Psychiatric:         Mood and Affect: Mood normal.         Behavior: Behavior normal.         Thought Content: Thought content normal.         Judgment: Judgment normal.     DISCLAIMER: This note was compiled by using a speech recognition dictation system and therefore please be aware that typographical / speech recognition errors can and do occur.  Please contact me if you see any errors " specifically.  Consent was obtained for FLEX recording system prior to the visit.

## 2024-10-01 ENCOUNTER — CLINICAL SUPPORT (OUTPATIENT)
Dept: ALLERGY | Facility: CLINIC | Age: 29
End: 2024-10-01
Payer: COMMERCIAL

## 2024-10-01 DIAGNOSIS — J30.9 CHRONIC ALLERGIC RHINITIS: Primary | ICD-10-CM

## 2024-10-01 PROCEDURE — 95115 IMMUNOTHERAPY ONE INJECTION: CPT | Mod: S$GLB,,, | Performed by: STUDENT IN AN ORGANIZED HEALTH CARE EDUCATION/TRAINING PROGRAM

## 2024-10-03 ENCOUNTER — LAB VISIT (OUTPATIENT)
Dept: LAB | Facility: HOSPITAL | Age: 29
End: 2024-10-03
Attending: INTERNAL MEDICINE
Payer: COMMERCIAL

## 2024-10-03 DIAGNOSIS — Z13.6 ENCOUNTER FOR LIPID SCREENING FOR CARDIOVASCULAR DISEASE: ICD-10-CM

## 2024-10-03 DIAGNOSIS — R53.82 CHRONIC FATIGUE: ICD-10-CM

## 2024-10-03 DIAGNOSIS — Z13.220 ENCOUNTER FOR LIPID SCREENING FOR CARDIOVASCULAR DISEASE: ICD-10-CM

## 2024-10-03 DIAGNOSIS — Z00.00 ENCOUNTER FOR ANNUAL HEALTH EXAMINATION: ICD-10-CM

## 2024-10-03 LAB
25(OH)D3+25(OH)D2 SERPL-MCNC: 49 NG/ML (ref 30–96)
ALBUMIN SERPL BCP-MCNC: 3.5 G/DL (ref 3.5–5.2)
ALP SERPL-CCNC: 53 U/L (ref 55–135)
ALT SERPL W/O P-5'-P-CCNC: 14 U/L (ref 10–44)
ANION GAP SERPL CALC-SCNC: 12 MMOL/L (ref 8–16)
AST SERPL-CCNC: 19 U/L (ref 10–40)
BASOPHILS # BLD AUTO: 0.02 K/UL (ref 0–0.2)
BASOPHILS NFR BLD: 0.4 % (ref 0–1.9)
BILIRUB SERPL-MCNC: 0.2 MG/DL (ref 0.1–1)
BUN SERPL-MCNC: 19 MG/DL (ref 6–20)
CALCIUM SERPL-MCNC: 9 MG/DL (ref 8.7–10.5)
CHLORIDE SERPL-SCNC: 104 MMOL/L (ref 95–110)
CHOLEST SERPL-MCNC: 181 MG/DL (ref 120–199)
CHOLEST/HDLC SERPL: 2.5 {RATIO} (ref 2–5)
CO2 SERPL-SCNC: 22 MMOL/L (ref 23–29)
CREAT SERPL-MCNC: 0.8 MG/DL (ref 0.5–1.4)
DIFFERENTIAL METHOD BLD: ABNORMAL
EOSINOPHIL # BLD AUTO: 0 K/UL (ref 0–0.5)
EOSINOPHIL NFR BLD: 0.2 % (ref 0–8)
ERYTHROCYTE [DISTWIDTH] IN BLOOD BY AUTOMATED COUNT: 12.3 % (ref 11.5–14.5)
EST. GFR  (NO RACE VARIABLE): >60 ML/MIN/1.73 M^2
FERRITIN SERPL-MCNC: 37 NG/ML (ref 20–300)
GLUCOSE SERPL-MCNC: 96 MG/DL (ref 70–110)
HCT VFR BLD AUTO: 38.4 % (ref 37–48.5)
HDLC SERPL-MCNC: 72 MG/DL (ref 40–75)
HDLC SERPL: 39.8 % (ref 20–50)
HGB BLD-MCNC: 12.7 G/DL (ref 12–16)
IMM GRANULOCYTES # BLD AUTO: 0.01 K/UL (ref 0–0.04)
IMM GRANULOCYTES NFR BLD AUTO: 0.2 % (ref 0–0.5)
IRON SERPL-MCNC: 75 UG/DL (ref 30–160)
LDLC SERPL CALC-MCNC: 96.2 MG/DL (ref 63–159)
LYMPHOCYTES # BLD AUTO: 1.7 K/UL (ref 1–4.8)
LYMPHOCYTES NFR BLD: 36.1 % (ref 18–48)
MCH RBC QN AUTO: 31.8 PG (ref 27–31)
MCHC RBC AUTO-ENTMCNC: 33.1 G/DL (ref 32–36)
MCV RBC AUTO: 96 FL (ref 82–98)
MONOCYTES # BLD AUTO: 0.3 K/UL (ref 0.3–1)
MONOCYTES NFR BLD: 5.8 % (ref 4–15)
NEUTROPHILS # BLD AUTO: 2.6 K/UL (ref 1.8–7.7)
NEUTROPHILS NFR BLD: 57.3 % (ref 38–73)
NONHDLC SERPL-MCNC: 109 MG/DL
NRBC BLD-RTO: 0 /100 WBC
PLATELET # BLD AUTO: 291 K/UL (ref 150–450)
PMV BLD AUTO: 10.6 FL (ref 9.2–12.9)
POTASSIUM SERPL-SCNC: 4.8 MMOL/L (ref 3.5–5.1)
PROT SERPL-MCNC: 7.1 G/DL (ref 6–8.4)
RBC # BLD AUTO: 3.99 M/UL (ref 4–5.4)
SATURATED IRON: 15 % (ref 20–50)
SODIUM SERPL-SCNC: 138 MMOL/L (ref 136–145)
TOTAL IRON BINDING CAPACITY: 488 UG/DL (ref 250–450)
TRANSFERRIN SERPL-MCNC: 330 MG/DL (ref 200–375)
TRIGL SERPL-MCNC: 64 MG/DL (ref 30–150)
TSH SERPL DL<=0.005 MIU/L-ACNC: 1.31 UIU/ML (ref 0.4–4)
VIT B12 SERPL-MCNC: 280 PG/ML (ref 210–950)
WBC # BLD AUTO: 4.62 K/UL (ref 3.9–12.7)

## 2024-10-03 PROCEDURE — 80061 LIPID PANEL: CPT | Performed by: INTERNAL MEDICINE

## 2024-10-03 PROCEDURE — 85025 COMPLETE CBC W/AUTO DIFF WBC: CPT | Performed by: INTERNAL MEDICINE

## 2024-10-03 PROCEDURE — 80053 COMPREHEN METABOLIC PANEL: CPT | Performed by: INTERNAL MEDICINE

## 2024-10-03 PROCEDURE — 84466 ASSAY OF TRANSFERRIN: CPT | Performed by: INTERNAL MEDICINE

## 2024-10-03 PROCEDURE — 82728 ASSAY OF FERRITIN: CPT | Performed by: INTERNAL MEDICINE

## 2024-10-03 PROCEDURE — 82607 VITAMIN B-12: CPT | Performed by: INTERNAL MEDICINE

## 2024-10-03 PROCEDURE — 82306 VITAMIN D 25 HYDROXY: CPT | Performed by: INTERNAL MEDICINE

## 2024-10-03 PROCEDURE — 36415 COLL VENOUS BLD VENIPUNCTURE: CPT | Mod: PO | Performed by: INTERNAL MEDICINE

## 2024-10-03 PROCEDURE — 84443 ASSAY THYROID STIM HORMONE: CPT | Performed by: INTERNAL MEDICINE

## 2024-10-08 ENCOUNTER — CLINICAL SUPPORT (OUTPATIENT)
Dept: ALLERGY | Facility: CLINIC | Age: 29
End: 2024-10-08
Payer: COMMERCIAL

## 2024-10-08 DIAGNOSIS — J30.9 CHRONIC ALLERGIC RHINITIS: Primary | ICD-10-CM

## 2024-10-08 PROCEDURE — 95115 IMMUNOTHERAPY ONE INJECTION: CPT | Mod: S$GLB,,, | Performed by: STUDENT IN AN ORGANIZED HEALTH CARE EDUCATION/TRAINING PROGRAM

## 2024-10-08 NOTE — PROGRESS NOTES
Patient is here for allergy injection. [facility] documentation did not cross over into Epic.      No issues with last injections.  No new meds or eye drops.  No wheezing or inhaler use for 48 hours.       Vial # 2   set  1 of  1   1:10  Yellow  C/D/GR/W  Exp 4/3/25        Administered 0.3 ml to upper left arm.                   Tolerated well.  Pt instructed to remain in clinic for 30 minutes to monitor for rxn.  Verbalized understanding.      No rxn noted after 30 minutes; pt released at this time.  Next allergy injection appointment scheduled per pt preferences.

## 2024-10-15 ENCOUNTER — CLINICAL SUPPORT (OUTPATIENT)
Dept: ALLERGY | Facility: CLINIC | Age: 29
End: 2024-10-15
Payer: COMMERCIAL

## 2024-10-15 DIAGNOSIS — J30.9 CHRONIC ALLERGIC RHINITIS: Primary | ICD-10-CM

## 2024-10-15 PROCEDURE — 95115 IMMUNOTHERAPY ONE INJECTION: CPT | Mod: S$GLB,,, | Performed by: STUDENT IN AN ORGANIZED HEALTH CARE EDUCATION/TRAINING PROGRAM

## 2024-10-15 RX ORDER — ONDANSETRON HYDROCHLORIDE 8 MG/1
TABLET, FILM COATED ORAL
Qty: 30 TABLET | Refills: 0 | Status: SHIPPED | OUTPATIENT
Start: 2024-10-15

## 2024-10-15 NOTE — TELEPHONE ENCOUNTER
No care due was identified.  [facility] Embedded Care Due Messages. Reference number: [id number].   10/15/2024 8:35:13 AM CDT

## 2024-10-15 NOTE — TELEPHONE ENCOUNTER
Refill Routing Note   Medication(s) are not appropriate for processing by [facility] for the following reason(s):        Outside of protocol    [facility] action(s):  Route               Appointments  past 12m or future 3m with PCP    Date Provider   Last Visit   9/25/2024 Irasema Jaquez MD   Next Visit   10/24/2024 Irasema Jaquez MD   ED visits in past 90 days: 0        Note composed:11:22 AM 10/15/2024

## 2024-10-21 RX ORDER — CETIRIZINE HYDROCHLORIDE 10 MG/1
10 TABLET ORAL
Qty: 90 TABLET | Refills: 3 | Status: SHIPPED | OUTPATIENT
Start: 2024-10-21

## 2024-10-21 NOTE — TELEPHONE ENCOUNTER
Refill Routing Note   Medication(s) are not appropriate for processing by [facility] for the following reason(s):        No active prescription written by provider    [facility] action(s):  Defer               Appointments  past 12m or future 3m with PCP    Date Provider   Last Visit   9/25/2024 Irasema Jaquez MD   Next Visit   10/24/2024 Irasema Jaquez MD   ED visits in past 90 days: 0        Note composed:11:19 AM 10/21/2024

## 2024-10-22 ENCOUNTER — CLINICAL SUPPORT (OUTPATIENT)
Dept: ALLERGY | Facility: CLINIC | Age: 29
End: 2024-10-22
Payer: COMMERCIAL

## 2024-10-22 DIAGNOSIS — J30.9 CHRONIC ALLERGIC RHINITIS: Primary | ICD-10-CM

## 2024-10-22 PROCEDURE — 95115 IMMUNOTHERAPY ONE INJECTION: CPT | Mod: S$GLB,,, | Performed by: STUDENT IN AN ORGANIZED HEALTH CARE EDUCATION/TRAINING PROGRAM

## 2024-10-24 ENCOUNTER — OFFICE VISIT (OUTPATIENT)
Dept: FAMILY MEDICINE | Facility: CLINIC | Age: 29
End: 2024-10-24
Payer: COMMERCIAL

## 2024-10-24 VITALS
BODY MASS INDEX: 38.64 KG/M2 | WEIGHT: 210 LBS | DIASTOLIC BLOOD PRESSURE: 74 MMHG | TEMPERATURE: 99 F | SYSTOLIC BLOOD PRESSURE: 109 MMHG | HEIGHT: 62 IN | HEART RATE: 89 BPM

## 2024-10-24 DIAGNOSIS — M25.531 PAIN IN BOTH WRISTS: ICD-10-CM

## 2024-10-24 DIAGNOSIS — F33.0 MILD EPISODE OF RECURRENT MAJOR DEPRESSIVE DISORDER: ICD-10-CM

## 2024-10-24 DIAGNOSIS — Z23 INFLUENZA VACCINE NEEDED: ICD-10-CM

## 2024-10-24 DIAGNOSIS — F98.8 ATTENTION DEFICIT DISORDER (ADD) WITHOUT HYPERACTIVITY: Primary | ICD-10-CM

## 2024-10-24 DIAGNOSIS — M25.532 PAIN IN BOTH WRISTS: ICD-10-CM

## 2024-10-24 DIAGNOSIS — Z23 NEED FOR VACCINATION: ICD-10-CM

## 2024-10-24 PROCEDURE — 99999 PR PBB SHADOW E&M-EST. PATIENT-LVL IV: CPT | Mod: PBBFAC,,, | Performed by: INTERNAL MEDICINE

## 2024-10-24 PROCEDURE — 3008F BODY MASS INDEX DOCD: CPT | Mod: CPTII,S$GLB,, | Performed by: INTERNAL MEDICINE

## 2024-10-24 PROCEDURE — 90656 IIV3 VACC NO PRSV 0.5 ML IM: CPT | Mod: S$GLB,,, | Performed by: INTERNAL MEDICINE

## 2024-10-24 PROCEDURE — 3044F HG A1C LEVEL LT 7.0%: CPT | Mod: CPTII,S$GLB,, | Performed by: INTERNAL MEDICINE

## 2024-10-24 PROCEDURE — 3078F DIAST BP <80 MM HG: CPT | Mod: CPTII,S$GLB,, | Performed by: INTERNAL MEDICINE

## 2024-10-24 PROCEDURE — 90471 IMMUNIZATION ADMIN: CPT | Mod: S$GLB,,, | Performed by: INTERNAL MEDICINE

## 2024-10-24 PROCEDURE — 1159F MED LIST DOCD IN RCRD: CPT | Mod: CPTII,S$GLB,, | Performed by: INTERNAL MEDICINE

## 2024-10-24 PROCEDURE — 3074F SYST BP LT 130 MM HG: CPT | Mod: CPTII,S$GLB,, | Performed by: INTERNAL MEDICINE

## 2024-10-24 PROCEDURE — 99214 OFFICE O/P EST MOD 30 MIN: CPT | Mod: 25,S$GLB,, | Performed by: INTERNAL MEDICINE

## 2024-10-24 RX ORDER — MELOXICAM 15 MG/1
15 TABLET ORAL DAILY
Qty: 30 TABLET | Refills: 5 | Status: SHIPPED | OUTPATIENT
Start: 2024-10-24

## 2024-10-24 RX ORDER — SERDEXMETHYLPHENIDATE AND DEXMETHYLPHENIDATE 7.8; 39.2 MG/1; MG/1
1 CAPSULE ORAL DAILY
Qty: 30 CAPSULE | Refills: 0 | Status: SHIPPED | OUTPATIENT
Start: 2024-10-24 | End: 2024-11-15 | Stop reason: ALTCHOICE

## 2024-10-26 DIAGNOSIS — F33.0 MILD EPISODE OF RECURRENT MAJOR DEPRESSIVE DISORDER: ICD-10-CM

## 2024-10-28 RX ORDER — DULOXETIN HYDROCHLORIDE 60 MG/1
CAPSULE, DELAYED RELEASE ORAL
Qty: 90 CAPSULE | Refills: 1 | Status: SHIPPED | OUTPATIENT
Start: 2024-10-28

## 2024-10-28 NOTE — PROGRESS NOTES
Assessment/Plan:    Problem List Items Addressed This Visit          Psychiatric    Mild episode of recurrent major depressive disorder    Overview     -chronic  -recently started on cymbalta with improvement of symptoms   -of note, she has been on pristiq, zoloft, wellbutrin, prozac in the past with either AE or ineffectiveness   -experiencing mild hyperhidrosis with the cymbalta but tolerating; she would like to continue for now  -discussed risk of discontinuing this medication without tapering  -patient was educated, advised of side effects, and all questions were answered.  Patient voiced understanding  -patient will follow up routinely and notify us if having any side effects or worsening or persistent symptoms.  ER precautions were given.           Attention deficit disorder (ADD) without hyperactivity - Primary    Overview     -therapist has conducted testing suggesting diagnosis of ADD  -symptoms improved since starting on medication  -currently on adderall XR 25 mg however experiencing issues with sleep maintenance   -she is interested in trying a new medication, Azstarys if insurance with approve  -The patient was checked in the [facility] Board of Pharmacy's Prescription Monitoring Program. There appears to be no incongruities with the patient's verbalized history.           Relevant Medications    serdexmethylphen-dexmethylphen (AZSTARYS) 39.2 mg- 7.8 mg Cap       Orthopedic    Pain in both wrists    Relevant Medications    meloxicam (MOBIC) 15 MG tablet     Other Visit Diagnoses       Influenza vaccine needed        Need for vaccination        Relevant Orders    Influenza - Trivalent - PF (ADULT) (Completed)        Visit today included increased complexity associated with the care of the episodic problem(s) addressed and managing the longitudinal care of the patient due to the serious and/or complex managed problem(s). See above assessment/plan.    Irasema Jaquez,  MD  _____________________________________________________________________________________________________________________________________________________    CC: Anxiety/depression/ADHD follow up    Patient is in clinic today as an established patient.    History of Present Illness  The patient is a 28-year-old female here for follow-up of anxiety and depression.    She reports an improvement in her condition since starting Cymbalta, with no significant side effects except for increased sweating. She is less isolated and more active, although she still experiences fatigue. She would like to continue the cymbalta for now.    She is currently on Adderall 25 mg, which she finds beneficial, but it causes her to wake up frequently during the night. She has expressed interest in trying Azstarys, a new medication. We discussed other immediate release options but she is concerned about compliance, as she often forgets to take her afternoon medications, even though she sets alarms.    She is unsure if her daily vitamins contain iron. She is currently on birth control pills and takes fiber gummies as needed.    She has a history of Hashimoto's disease and is due for a thyroid ultrasound next month.     No other new complaints today.  Remaining chronic conditions have been reviewed and remain stable. Further detail as stated above.      Current Outpatient Medications on File Prior to Visit   Medication Sig Dispense Refill    azelastine (ASTELIN) 137 mcg (0.1 %) nasal spray 1 spray (137 mcg total) by Nasal route 2 (two) times daily. for 7 days 30 mL 0    cetirizine (ZYRTEC) 10 MG tablet TAKE 1 TABLET BY MOUTH EVERY DAY 90 tablet 3    cycloSPORINE (RESTASIS) 0.05 % ophthalmic emulsion Place 1 drop into both eyes 2 (two) times daily.      dicyclomine (BENTYL) 10 MG capsule Take 1 capsule (10 mg total) by mouth 4 (four) times daily before meals and nightly. , to ease colon spasms and diarrhea. 120 capsule 11    hydrOXYzine pamoate  "(VISTARIL) 25 MG Cap Take 1 capsule (25 mg total) by mouth every 8 (eight) hours as needed (itching). 30 capsule 0    metFORMIN (GLUCOPHAGE-XR) 500 MG ER 24hr tablet Take 1 tablet (500 mg total) by mouth 2 (two) times daily with meals. 180 tablet 3    norethindrone-e.estradioL-iron (MINASTRIN 24 FE) 1 mg-20 mcg(24) /75 mg (4) Chew Take 1 tablet by mouth once daily.      ondansetron (ZOFRAN) 8 MG tablet TAKE 1 TABLET(8 MG) BY MOUTH EVERY 8 HOURS AS NEEDED 30 tablet 0    triamcinolone acetonide 0.1% (KENALOG) 0.1 % cream Apply topically 2 (two) times daily. 453.6 g 0     No current facility-administered medications on file prior to visit.       Review of Systems   Constitutional:  Positive for fatigue. Negative for chills, diaphoresis and fever.   HENT:  Negative for congestion, ear pain, postnasal drip, sinus pain and sore throat.    Eyes:  Negative for pain and redness.   Respiratory:  Negative for cough, chest tightness and shortness of breath.    Cardiovascular:  Negative for chest pain and leg swelling.   Gastrointestinal:  Negative for abdominal pain, constipation, diarrhea, nausea and vomiting.   Genitourinary:  Negative for dysuria and hematuria.   Musculoskeletal:  Negative for arthralgias and joint swelling.   Skin:  Negative for rash.   Neurological:  Negative for dizziness, syncope and headaches.   Psychiatric/Behavioral:  Positive for sleep disturbance. Negative for dysphoric mood, self-injury and suicidal ideas. The patient is not nervous/anxious.        Vitals:    10/24/24 0729   BP: 109/74   BP Location: Right arm   Patient Position: Sitting   Pulse: 89   Temp: 98.5 °F (36.9 °C)   Weight: 95.3 kg (210 lb)   Height: 5' 2" (1.575 m)       Wt Readings from Last 3 Encounters:   10/24/24 95.3 kg (210 lb)   09/25/24 96.3 kg (212 lb 6.6 oz)   08/08/24 95.3 kg (210 lb)       Physical Exam  Constitutional:       General: She is not in acute distress.     Appearance: Normal appearance. She is well-developed. She " is obese.   HENT:      Head: Normocephalic and atraumatic.   Eyes:      Conjunctiva/sclera: Conjunctivae normal.   Cardiovascular:      Rate and Rhythm: Normal rate and regular rhythm.      Pulses: Normal pulses.      Heart sounds: Normal heart sounds. No murmur heard.  Pulmonary:      Effort: Pulmonary effort is normal. No respiratory distress.      Breath sounds: Normal breath sounds.   Abdominal:      General: Bowel sounds are normal. There is no distension.      Palpations: Abdomen is soft.      Tenderness: There is no abdominal tenderness.   Musculoskeletal:         General: Normal range of motion.      Cervical back: Normal range of motion and neck supple.   Skin:     General: Skin is warm and dry.      Findings: No rash.   Neurological:      General: No focal deficit present.      Mental Status: She is alert and oriented to person, place, and time.   Psychiatric:         Mood and Affect: Mood normal.         Behavior: Behavior normal.         DISCLAIMER: This note was compiled by using a speech recognition dictation system and therefore please be aware that typographical / speech recognition errors can and do occur.  Please contact me if you see any errors specifically.  Consent was obtained for FLEX recording system prior to the visit.

## 2024-10-29 ENCOUNTER — CLINICAL SUPPORT (OUTPATIENT)
Dept: ALLERGY | Facility: CLINIC | Age: 29
End: 2024-10-29
Payer: COMMERCIAL

## 2024-10-29 DIAGNOSIS — J30.9 CHRONIC ALLERGIC RHINITIS: Primary | ICD-10-CM

## 2024-10-29 PROCEDURE — 95115 IMMUNOTHERAPY ONE INJECTION: CPT | Mod: S$GLB,,, | Performed by: STUDENT IN AN ORGANIZED HEALTH CARE EDUCATION/TRAINING PROGRAM

## 2024-10-31 ENCOUNTER — HOSPITAL ENCOUNTER (OUTPATIENT)
Dept: RADIOLOGY | Facility: HOSPITAL | Age: 29
Discharge: HOME | End: 2024-10-31
Attending: INTERNAL MEDICINE
Payer: COMMERCIAL

## 2024-10-31 DIAGNOSIS — E04.2 MULTIPLE THYROID NODULES: ICD-10-CM

## 2024-10-31 PROCEDURE — 76536 US EXAM OF HEAD AND NECK: CPT | Mod: TC,PO

## 2024-11-05 ENCOUNTER — CLINICAL SUPPORT (OUTPATIENT)
Dept: ALLERGY | Facility: CLINIC | Age: 29
End: 2024-11-05
Payer: COMMERCIAL

## 2024-11-05 DIAGNOSIS — J30.9 CHRONIC ALLERGIC RHINITIS: Primary | ICD-10-CM

## 2024-11-05 PROCEDURE — 99999 PR PBB SHADOW E&M-EST. PATIENT-LVL I: CPT | Mod: PBBFAC,,,

## 2024-11-05 PROCEDURE — 95115 IMMUNOTHERAPY ONE INJECTION: CPT | Mod: S$GLB,,, | Performed by: STUDENT IN AN ORGANIZED HEALTH CARE EDUCATION/TRAINING PROGRAM

## 2024-11-11 DIAGNOSIS — F98.8 ATTENTION DEFICIT DISORDER (ADD) WITHOUT HYPERACTIVITY: ICD-10-CM

## 2024-11-11 RX ORDER — SERDEXMETHYLPHENIDATE AND DEXMETHYLPHENIDATE 7.8; 39.2 MG/1; MG/1
1 CAPSULE ORAL DAILY
Qty: 30 CAPSULE | Refills: 0 | Status: CANCELLED | OUTPATIENT
Start: 2024-11-11

## 2024-11-11 RX ORDER — DEXTROAMPHETAMINE SACCHARATE, AMPHETAMINE ASPARTATE, DEXTROAMPHETAMINE SULFATE AND AMPHETAMINE SULFATE 3.75; 3.75; 3.75; 3.75 MG/1; MG/1; MG/1; MG/1
15 TABLET ORAL 2 TIMES DAILY
Qty: 60 TABLET | Refills: 0 | Status: SHIPPED | OUTPATIENT
Start: 2024-11-11 | End: 2024-12-11

## 2024-11-11 NOTE — TELEPHONE ENCOUNTER
Patient comment: As expected Azstarys is not covered by my insurance. At one point we had discussed going to 15mg adderall Q12h. Can you send this in please?

## 2024-11-11 NOTE — TELEPHONE ENCOUNTER
I have signed for the following orders AND/OR meds.  Please call the patient and ask the patient to schedule the testing AND/OR inform about any medications that were sent. Medications have been sent to pharmacy listed below      No orders of the defined types were placed in this encounter.      Medications Ordered This Encounter   Medications    dextroamphetamine-amphetamine (ADDERALL) 15 mg tablet     Sig: Take 1 tablet (15 mg total) by mouth 2 (two) times daily.     Dispense:  60 tablet     Refill:  0         [id number] - [place], [place] - [address] AT [place] [address] [address] [place] [id number] [facility]  [place] [address]  Phone: [phone] Fax: [phone]

## 2024-11-12 ENCOUNTER — CLINICAL SUPPORT (OUTPATIENT)
Dept: ALLERGY | Facility: CLINIC | Age: 29
End: 2024-11-12
Payer: COMMERCIAL

## 2024-11-12 DIAGNOSIS — J30.9 CHRONIC ALLERGIC RHINITIS: Primary | ICD-10-CM

## 2024-11-12 PROCEDURE — 95115 IMMUNOTHERAPY ONE INJECTION: CPT | Mod: S$GLB,,, | Performed by: STUDENT IN AN ORGANIZED HEALTH CARE EDUCATION/TRAINING PROGRAM

## 2024-11-12 PROCEDURE — 99999 PR PBB SHADOW E&M-EST. PATIENT-LVL I: CPT | Mod: PBBFAC,,,

## 2024-11-14 ENCOUNTER — E-VISIT (OUTPATIENT)
Dept: FAMILY MEDICINE | Facility: CLINIC | Age: 29
End: 2024-11-14
Payer: COMMERCIAL

## 2024-11-14 DIAGNOSIS — K21.00 GASTROESOPHAGEAL REFLUX DISEASE WITH ESOPHAGITIS WITHOUT HEMORRHAGE: Primary | ICD-10-CM

## 2024-11-15 NOTE — PROGRESS NOTES
Patient ID: Alyson Vila is a 28 y.o. female.    Chief Complaint: General Illness (Entered automatically based on patient selection in [employer].)    The patient initiated a request through [employer] on 11/14/2024 for evaluation and management with a chief complaint of General Illness (Entered automatically based on patient selection in [employer].)     I evaluated the questionnaire submission on 11/14/2024  .    [facility] Peq Evisit Supergroup-Common Problems    11/14/2024  9:57 AM CST - Filed by Patient   What do you need help with? Acid Reflux   Do you agree to participate in an E-Visit? Yes   If you have any of the following symptoms, please present to your local emergency room or call 911: I acknowledge   If you have any of the following symptoms, please schedule a visit with your primary care provider: I acknowledge   Select all that apply: None of the above   What is the main issue you would like addressed today? Heartburn / acid reflux   How long have you had heartburn? A month to several months   How often do you experience heartburn? Once a day   Where do you feel the heartburn? In the middle of my chest;  In my back;  In my stomach   How long does your heartburn last? It lasts for 20 minutes to an hour   Does your heartburn wake you from sleep? No   Does your heartburn limit your ability to do thing you need to do? No   Does your heartburn change depending on whether you are sitting, standing, or lying down? Yes   Which of the following are you experiencing: Frequent sore throat   Do you have any of the following? None of the above   Do you have trouble or pain with  swallowing? No   While eating, do you feel full quicker than usual? Yes   Do you have any of the following? Chest pain with activity   Which of the following makes the heartburn worse? Eating certain foods;  Lying down   Do you have any of the following? None of the above   Have you lost weight lately without trying? No   Have you ever been told you  have the following? Gastritis   Have you seen a healthcare provider for your heartburn? I saw someone in the past year   Have you taken any medicines to relieve your heartburn in the past? Antacids (Tums, Rolaids, Maalox, other);  PPIs (Prilosec, Nexium, Prevacid, Prontonix)   Have the medicines provided relief? Yes   Have you had any of the following for heartburn? Upper GI study;  Scope of your stomach   Provide any additional information you feel is important. I have been taking prescribed prilosec daily for the past month. Even doing this I am still doing getting heart burn every morning and most afternoons. Usually resolves with Tums but sometimes I have to take it multiple times before getting relief.   Please attach any relevant images or files    Are you able to take your vital signs? No          Active Problem List with Overview Notes    Diagnosis Date Noted    Right upper quadrant pain 01/24/2024    Pain in both wrists 10/03/2023    Decreased range of motion of right wrist 10/03/2023    Decreased  strength 10/03/2023    Decreased activities of daily living (ADL) 10/03/2023    Paresthesia of both hands 10/03/2023    Attention deficit disorder (ADD) without hyperactivity 07/05/2023     -therapist has conducted testing suggesting diagnosis of ADD  -symptoms improved since starting on medication  -currently on adderall XR 25 mg however experiencing issues with sleep maintenance   -she is interested in trying a new medication, Azstarys if insurance with approve  -The patient was checked in the [facility] Board of Pharmacy's Prescription Monitoring Program. There appears to be no incongruities with the patient's verbalized history.        GAL (generalized anxiety disorder) 06/13/2023    Mild episode of recurrent major depressive disorder 06/13/2023     -chronic  -recently started on cymbalta with improvement of symptoms   -of note, she has been on pristiq, zoloft, wellbutrin, prozac in the past with  either AE or ineffectiveness   -experiencing mild hyperhidrosis with the cymbalta but tolerating; she would like to continue for now  -discussed risk of discontinuing this medication without tapering  -patient was educated, advised of side effects, and all questions were answered.  Patient voiced understanding  -patient will follow up routinely and notify us if having any side effects or worsening or persistent symptoms.  ER precautions were given.        Severe obesity (BMI 35.0-39.9) with comorbidity 06/13/2023     General weight loss/lifestyle modification strategies discussed (elicit support from others; identify saboteurs; non-food rewards, etc).  Informal exercise measures discussed, e.g. taking stairs instead of elevator.  Regular aerobic exercise program discussed.        BMI 38.0-38.9,adult 11/10/2022    PCOS (polycystic ovarian syndrome) 11/04/2022     -followed by GYN  -currently on OCP      Multiple thyroid nodules 11/04/2022     -US Nov 2022- multiple nodules, none meeting FNA criteria  -repeat US Nov 2023- slight enlargement of thyroid nodule; 1 yr f/u recommended  -repeat US ordered  -thyroid studies pending        Recent Labs Obtained:  No visits with results within 7 Day(s) from this visit.   Latest known visit with results is:   Lab Visit on 10/03/2024   Component Date Value Ref Range Status    WBC 10/03/2024 4.62  3.90 - 12.70 K/uL Final    RBC 10/03/2024 3.99 (L)  4.00 - 5.40 M/uL Final    Hemoglobin 10/03/2024 12.7  12.0 - 16.0 g/dL Final    Hematocrit 10/03/2024 38.4  37.0 - 48.5 % Final    MCV 10/03/2024 96  82 - 98 fL Final    MCH 10/03/2024 31.8 (H)  27.0 - 31.0 pg Final    MCHC 10/03/2024 33.1  32.0 - 36.0 g/dL Final    RDW 10/03/2024 12.3  11.5 - 14.5 % Final    Platelets 10/03/2024 291  150 - 450 K/uL Final    MPV 10/03/2024 10.6  9.2 - 12.9 fL Final    Immature Granulocytes 10/03/2024 0.2  0.0 - 0.5 % Final    Gran # (ANC) 10/03/2024 2.6  1.8 - 7.7 K/uL Final    Immature Grans (Abs)  10/03/2024 0.01  0.00 - 0.04 K/uL Final    Comment: Mild elevation in immature granulocytes is non specific and   can be seen in a variety of conditions including stress response,   acute inflammation, trauma and pregnancy. Correlation with other   laboratory and clinical findings is essential.      Lymph # 10/03/2024 1.7  1.0 - 4.8 K/uL Final    Mono # 10/03/2024 0.3  0.3 - 1.0 K/uL Final    Eos # 10/03/2024 0.0  0.0 - 0.5 K/uL Final    Baso # 10/03/2024 0.02  0.00 - 0.20 K/uL Final    nRBC 10/03/2024 0  0 /100 WBC Final    Gran % 10/03/2024 57.3  38.0 - 73.0 % Final    Lymph % 10/03/2024 36.1  18.0 - 48.0 % Final    Mono % 10/03/2024 5.8  4.0 - 15.0 % Final    Eosinophil % 10/03/2024 0.2  0.0 - 8.0 % Final    Basophil % 10/03/2024 0.4  0.0 - 1.9 % Final    Differential Method 10/03/2024 Automated   Final    Sodium 10/03/2024 138  136 - 145 mmol/L Final    Potassium 10/03/2024 4.8  3.5 - 5.1 mmol/L Final    Chloride 10/03/2024 104  95 - 110 mmol/L Final    CO2 10/03/2024 22 (L)  23 - 29 mmol/L Final    Glucose 10/03/2024 96  70 - 110 mg/dL Final    BUN 10/03/2024 19  6 - 20 mg/dL Final    Creatinine 10/03/2024 0.8  0.5 - 1.4 mg/dL Final    Calcium 10/03/2024 9.0  8.7 - 10.5 mg/dL Final    Total Protein 10/03/2024 7.1  6.0 - 8.4 g/dL Final    Albumin 10/03/2024 3.5  3.5 - 5.2 g/dL Final    Total Bilirubin 10/03/2024 0.2  0.1 - 1.0 mg/dL Final    Comment: For infants and newborns, interpretation of results should be based  on gestational age, weight and in agreement with clinical  observations.    Premature Infant recommended reference ranges:  Up to 24 hours.............<8.0 mg/dL  Up to 48 hours............<12.0 mg/dL  3-5 days..................<15.0 mg/dL  6-29 days.................<15.0 mg/dL      Alkaline Phosphatase 10/03/2024 53 (L)  55 - 135 U/L Final    AST 10/03/2024 19  10 - 40 U/L Final    ALT 10/03/2024 14  10 - 44 U/L Final    eGFR 10/03/2024 >60.0  >60 mL/min/1.73 m^2 Final    Anion Gap 10/03/2024 12   8 - 16 mmol/L Final    Cholesterol 10/03/2024 181  120 - 199 mg/dL Final    Comment: The National Cholesterol Education Program (NCEP) has set the  following guidelines (reference ranges) for Cholesterol:  Optimal.....................<200 mg/dL  Borderline High.............200-239 mg/dL  High........................> or = 240 mg/dL      Triglycerides 10/03/2024 64  30 - 150 mg/dL Final    Comment: The National Cholesterol Education Program (NCEP) has set the  following guidelines (reference values) for triglycerides:  Normal......................<150 mg/dL  Borderline High.............150-199 mg/dL  High........................200-499 mg/dL      HDL 10/03/2024 72  40 - 75 mg/dL Final    Comment: The National Cholesterol Education Program (NCEP) has set the  following guidelines (reference values) for HDL Cholesterol:  Low...............<40 mg/dL  Optimal...........>60 mg/dL      LDL Cholesterol 10/03/2024 96.2  63.0 - 159.0 mg/dL Final    Comment: The National Cholesterol Education Program (NCEP) has set the  following guidelines (reference values) for LDL Cholesterol:  Optimal.......................<130 mg/dL  Borderline High...............130-159 mg/dL  High..........................160-189 mg/dL  Very High.....................>190 mg/dL      HDL/Cholesterol Ratio 10/03/2024 39.8  20.0 - 50.0 % Final    Total Cholesterol/HDL Ratio 10/03/2024 2.5  2.0 - 5.0 Final    Non-HDL Cholesterol 10/03/2024 109  mg/dL Final    Comment: Risk category and Non-HDL cholesterol goals:  Coronary heart disease (CHD)or equivalent (10-year risk of CHD >20%):  Non-HDL cholesterol goal     <130 mg/dL  Two or more CHD risk factors and 10-year risk of CHD <= 20%:  Non-HDL cholesterol goal     <160 mg/dL  0 to 1 CHD risk factor:  Non-HDL cholesterol goal     <190 mg/dL      TSH 10/03/2024 1.309  0.400 - 4.000 uIU/mL Final    Iron 10/03/2024 75  30 - 160 ug/dL Final    Transferrin 10/03/2024 330  200 - 375 mg/dL Final    TIBC 10/03/2024 488  (H)  250 - 450 ug/dL Final    Saturated Iron 10/03/2024 15 (L)  20 - 50 % Final    Ferritin 10/03/2024 37  20.0 - 300.0 ng/mL Final    Vitamin B-12 10/03/2024 280  210 - 950 pg/mL Final    Vit D, 25-Hydroxy 10/03/2024 49  30 - 96 ng/mL Final    Comment: Vitamin D deficiency.........<10 ng/mL                              Vitamin D insufficiency......10-29 ng/mL       Vitamin D sufficiency........> or equal to 30 ng/mL  Vitamin D toxicity............>100 ng/mL         Encounter Diagnosis   Name Primary?    Gastroesophageal reflux disease with esophagitis without hemorrhage Yes     -discussed correct way to take PPI, first thing in the am, prior to food and drink  -if doing so already, recommended to increase PPI to BID dosing  -denies alarm symptoms, such as dysphagia, weight loss or N/V  -continue lifestyle modification with avoidance of acidic foods, caffeine, late night eating      No orders of the defined types were placed in this encounter.           E-Visit Time Tracking:

## 2024-11-18 ENCOUNTER — PATIENT MESSAGE (OUTPATIENT)
Dept: ALLERGY | Facility: CLINIC | Age: 29
End: 2024-11-18
Payer: COMMERCIAL

## 2024-11-20 ENCOUNTER — PATIENT MESSAGE (OUTPATIENT)
Dept: FAMILY MEDICINE | Facility: CLINIC | Age: 29
End: 2024-11-20
Payer: COMMERCIAL

## 2024-11-21 ENCOUNTER — CLINICAL SUPPORT (OUTPATIENT)
Dept: ALLERGY | Facility: CLINIC | Age: 29
End: 2024-11-21
Payer: COMMERCIAL

## 2024-11-21 DIAGNOSIS — J30.9 CHRONIC ALLERGIC RHINITIS: Primary | ICD-10-CM

## 2024-11-21 PROCEDURE — 99999 PR PBB SHADOW E&M-EST. PATIENT-LVL I: CPT | Mod: PBBFAC,,,

## 2024-11-21 PROCEDURE — 95115 IMMUNOTHERAPY ONE INJECTION: CPT | Mod: S$GLB,,, | Performed by: STUDENT IN AN ORGANIZED HEALTH CARE EDUCATION/TRAINING PROGRAM

## 2024-11-22 ENCOUNTER — TELEPHONE (OUTPATIENT)
Dept: FAMILY MEDICINE | Facility: CLINIC | Age: 29
End: 2024-11-22
Payer: COMMERCIAL

## 2024-11-22 DIAGNOSIS — E66.01 SEVERE OBESITY (BMI 35.0-39.9) WITH COMORBIDITY: Primary | ICD-10-CM

## 2024-11-22 NOTE — TELEPHONE ENCOUNTER
I have signed for the following orders AND/OR meds.  Please call the patient and ask the patient to schedule the testing AND/OR inform about any medications that were sent. Medications have been sent to pharmacy listed below      Orders Placed This Encounter   Procedures    Ambulatory referral/consult to Nutrition Services     Standing Status:   Future     Standing Expiration Date:   12/22/2025     Referral Priority:   Routine     Referral Type:   Consultation     Referral Reason:   Specialty Services Required     Referred to Provider:   Vivian Chavez RD     Requested Specialty:   Nutrition     Number of Visits Requested:   1              [id number] - [place] - [address] [facility] [address] [place] [address] [facility]  [place] [address]  Phone: [phone] Fax: [phone]

## 2024-11-22 NOTE — TELEPHONE ENCOUNTER
----- Message from Lindsay sent at 11/22/2024 10:15 AM CST -----  Name of Who is Calling: Vivian pruett        What is the request in detail:Vivian pruett would like a call back in regards to a referral faxed that was sent but no order on what the pt needs to be seen for. Please advise thank you.         Can the clinic reply by [facility]:no         What Number to Call Back if not in [facility]: [phone] / fax [phone]

## 2024-11-26 ENCOUNTER — CLINICAL SUPPORT (OUTPATIENT)
Dept: ALLERGY | Facility: CLINIC | Age: 29
End: 2024-11-26
Payer: COMMERCIAL

## 2024-11-26 DIAGNOSIS — J30.9 CHRONIC ALLERGIC RHINITIS: Primary | ICD-10-CM

## 2024-11-26 PROCEDURE — 99999 PR PBB SHADOW E&M-EST. PATIENT-LVL I: CPT | Mod: PBBFAC,,,

## 2024-11-26 PROCEDURE — 95115 IMMUNOTHERAPY ONE INJECTION: CPT | Mod: S$GLB,,, | Performed by: STUDENT IN AN ORGANIZED HEALTH CARE EDUCATION/TRAINING PROGRAM

## 2024-11-27 ENCOUNTER — PATIENT MESSAGE (OUTPATIENT)
Dept: ALLERGY | Facility: CLINIC | Age: 29
End: 2024-11-27
Payer: COMMERCIAL

## 2024-11-29 DIAGNOSIS — F41.1 GAD (GENERALIZED ANXIETY DISORDER): ICD-10-CM

## 2024-11-29 DIAGNOSIS — F33.0 MILD EPISODE OF RECURRENT MAJOR DEPRESSIVE DISORDER: ICD-10-CM

## 2024-11-29 RX ORDER — DESVENLAFAXINE 100 MG/1
100 TABLET, EXTENDED RELEASE ORAL
Qty: 90 TABLET | Refills: 3 | OUTPATIENT
Start: 2024-11-29

## 2024-11-29 NOTE — TELEPHONE ENCOUNTER
Refill Decision Note   Alyson Julito  is requesting a refill authorization.  Brief Assessment and Rationale for Refill:  Quick Discontinue     Medication Therapy Plan: Alexandrea rogers by PCP on 9/25/24-Alternate Therapy      Comments:     Note composed:11:49 AM 11/29/2024

## 2024-12-10 ENCOUNTER — CLINICAL SUPPORT (OUTPATIENT)
Dept: ALLERGY | Facility: CLINIC | Age: 29
End: 2024-12-10
Payer: COMMERCIAL

## 2024-12-10 DIAGNOSIS — J30.9 CHRONIC ALLERGIC RHINITIS: Primary | ICD-10-CM

## 2024-12-10 PROCEDURE — 99999 PR PBB SHADOW E&M-EST. PATIENT-LVL I: CPT | Mod: PBBFAC,,,

## 2024-12-10 PROCEDURE — 95115 IMMUNOTHERAPY ONE INJECTION: CPT | Mod: S$GLB,,, | Performed by: STUDENT IN AN ORGANIZED HEALTH CARE EDUCATION/TRAINING PROGRAM

## 2024-12-19 ENCOUNTER — CLINICAL SUPPORT (OUTPATIENT)
Dept: ALLERGY | Facility: CLINIC | Age: 29
End: 2024-12-19
Payer: COMMERCIAL

## 2024-12-19 DIAGNOSIS — J30.9 CHRONIC ALLERGIC RHINITIS: Primary | ICD-10-CM

## 2024-12-19 DIAGNOSIS — F98.8 ATTENTION DEFICIT DISORDER (ADD) WITHOUT HYPERACTIVITY: ICD-10-CM

## 2024-12-19 PROCEDURE — 99999 PR PBB SHADOW E&M-EST. PATIENT-LVL I: CPT | Mod: PBBFAC,,,

## 2024-12-19 RX ORDER — DEXTROAMPHETAMINE SACCHARATE, AMPHETAMINE ASPARTATE, DEXTROAMPHETAMINE SULFATE AND AMPHETAMINE SULFATE 3.75; 3.75; 3.75; 3.75 MG/1; MG/1; MG/1; MG/1
15 TABLET ORAL 2 TIMES DAILY
Qty: 60 TABLET | Refills: 0 | Status: SHIPPED | OUTPATIENT
Start: 2024-12-19 | End: 2025-01-18

## 2024-12-19 NOTE — TELEPHONE ENCOUNTER
No care due was identified.  [facility] Embedded Care Due Messages. Reference number: [id number].   12/19/2024 1:53:41 PM CST

## 2025-01-02 ENCOUNTER — CLINICAL SUPPORT (OUTPATIENT)
Dept: ALLERGY | Facility: CLINIC | Age: 30
End: 2025-01-02
Payer: COMMERCIAL

## 2025-01-02 DIAGNOSIS — J30.9 CHRONIC ALLERGIC RHINITIS: Primary | ICD-10-CM

## 2025-01-02 PROCEDURE — 99999 PR PBB SHADOW E&M-EST. PATIENT-LVL I: CPT | Mod: PBBFAC,,,

## 2025-01-02 PROCEDURE — 95115 IMMUNOTHERAPY ONE INJECTION: CPT | Mod: S$GLB,,, | Performed by: STUDENT IN AN ORGANIZED HEALTH CARE EDUCATION/TRAINING PROGRAM

## 2025-01-07 ENCOUNTER — OFFICE VISIT (OUTPATIENT)
Dept: PRIMARY CARE CLINIC | Facility: CLINIC | Age: 30
End: 2025-01-07
Payer: COMMERCIAL

## 2025-01-07 ENCOUNTER — CLINICAL SUPPORT (OUTPATIENT)
Dept: ALLERGY | Facility: CLINIC | Age: 30
End: 2025-01-07
Payer: COMMERCIAL

## 2025-01-07 VITALS
TEMPERATURE: 98 F | OXYGEN SATURATION: 99 % | WEIGHT: 208 LBS | SYSTOLIC BLOOD PRESSURE: 104 MMHG | HEIGHT: 62 IN | DIASTOLIC BLOOD PRESSURE: 78 MMHG | BODY MASS INDEX: 38.28 KG/M2 | HEART RATE: 105 BPM

## 2025-01-07 DIAGNOSIS — E66.01 SEVERE OBESITY (BMI 35.0-39.9) WITH COMORBIDITY: ICD-10-CM

## 2025-01-07 DIAGNOSIS — F32.1 MODERATE SINGLE CURRENT EPISODE OF MAJOR DEPRESSIVE DISORDER: ICD-10-CM

## 2025-01-07 DIAGNOSIS — J30.9 CHRONIC ALLERGIC RHINITIS: Primary | ICD-10-CM

## 2025-01-07 DIAGNOSIS — F41.1 GAD (GENERALIZED ANXIETY DISORDER): Primary | ICD-10-CM

## 2025-01-07 PROCEDURE — 3078F DIAST BP <80 MM HG: CPT | Mod: CPTII,S$GLB,, | Performed by: INTERNAL MEDICINE

## 2025-01-07 PROCEDURE — 3008F BODY MASS INDEX DOCD: CPT | Mod: CPTII,S$GLB,, | Performed by: INTERNAL MEDICINE

## 2025-01-07 PROCEDURE — 1159F MED LIST DOCD IN RCRD: CPT | Mod: CPTII,S$GLB,, | Performed by: INTERNAL MEDICINE

## 2025-01-07 PROCEDURE — 99999 PR PBB SHADOW E&M-EST. PATIENT-LVL IV: CPT | Mod: PBBFAC,,, | Performed by: INTERNAL MEDICINE

## 2025-01-07 PROCEDURE — 95115 IMMUNOTHERAPY ONE INJECTION: CPT | Mod: S$GLB,,, | Performed by: STUDENT IN AN ORGANIZED HEALTH CARE EDUCATION/TRAINING PROGRAM

## 2025-01-07 PROCEDURE — 99214 OFFICE O/P EST MOD 30 MIN: CPT | Mod: S$GLB,,, | Performed by: INTERNAL MEDICINE

## 2025-01-07 PROCEDURE — 99999 PR PBB SHADOW E&M-EST. PATIENT-LVL I: CPT | Mod: PBBFAC,,,

## 2025-01-07 PROCEDURE — 3074F SYST BP LT 130 MM HG: CPT | Mod: CPTII,S$GLB,, | Performed by: INTERNAL MEDICINE

## 2025-01-07 PROCEDURE — G2211 COMPLEX E/M VISIT ADD ON: HCPCS | Mod: S$GLB,,, | Performed by: INTERNAL MEDICINE

## 2025-01-07 RX ORDER — PANTOPRAZOLE SODIUM 40 MG/1
40 TABLET, DELAYED RELEASE ORAL DAILY
COMMUNITY
Start: 2024-10-22

## 2025-01-07 RX ORDER — VENLAFAXINE HYDROCHLORIDE 150 MG/1
150 CAPSULE, EXTENDED RELEASE ORAL DAILY
Qty: 30 CAPSULE | Refills: 1 | Status: SHIPPED | OUTPATIENT
Start: 2025-01-07 | End: 2026-01-07

## 2025-01-08 RX ORDER — PANTOPRAZOLE SODIUM 40 MG/1
40 TABLET, DELAYED RELEASE ORAL
Qty: 90 TABLET | OUTPATIENT
Start: 2025-01-08

## 2025-01-24 ENCOUNTER — CLINICAL SUPPORT (OUTPATIENT)
Dept: ALLERGY | Facility: CLINIC | Age: 30
End: 2025-01-24
Payer: COMMERCIAL

## 2025-01-24 DIAGNOSIS — J30.9 CHRONIC ALLERGIC RHINITIS: Primary | ICD-10-CM

## 2025-01-24 PROCEDURE — 95115 IMMUNOTHERAPY ONE INJECTION: CPT | Mod: S$GLB,,, | Performed by: STUDENT IN AN ORGANIZED HEALTH CARE EDUCATION/TRAINING PROGRAM

## 2025-01-24 PROCEDURE — 99999 PR PBB SHADOW E&M-EST. PATIENT-LVL I: CPT | Mod: PBBFAC,,,

## 2025-01-27 DIAGNOSIS — F98.8 ATTENTION DEFICIT DISORDER (ADD) WITHOUT HYPERACTIVITY: ICD-10-CM

## 2025-01-27 RX ORDER — DEXTROAMPHETAMINE SACCHARATE, AMPHETAMINE ASPARTATE, DEXTROAMPHETAMINE SULFATE AND AMPHETAMINE SULFATE 3.75; 3.75; 3.75; 3.75 MG/1; MG/1; MG/1; MG/1
15 TABLET ORAL 2 TIMES DAILY
Qty: 60 TABLET | Refills: 0 | Status: SHIPPED | OUTPATIENT
Start: 2025-01-27 | End: 2025-02-26

## 2025-01-28 ENCOUNTER — PATIENT MESSAGE (OUTPATIENT)
Dept: PRIMARY CARE CLINIC | Facility: CLINIC | Age: 30
End: 2025-01-28
Payer: COMMERCIAL

## 2025-01-28 ENCOUNTER — PATIENT MESSAGE (OUTPATIENT)
Dept: ENDOCRINOLOGY | Facility: CLINIC | Age: 30
End: 2025-01-28
Payer: COMMERCIAL

## 2025-01-28 PROBLEM — F32.1 MODERATE SINGLE CURRENT EPISODE OF MAJOR DEPRESSIVE DISORDER: Status: ACTIVE | Noted: 2025-01-28

## 2025-01-28 NOTE — PROGRESS NOTES
Assessment/Plan:    1. GAL (generalized anxiety disorder)  -     venlafaxine (EFFEXOR-XR) 150 MG Cp24; Take 1 capsule (150 mg total) by mouth once daily.  Dispense: 30 capsule; Refill: 1    2. Moderate single current episode of major depressive disorder  Overview:  -chronic  -currently on cymbalta with initial improvement of symptoms but now experiencing return of symptoms, as well as significant side effects from the medication  -plan to switch from cymbalta to effexor with close follow up  -of note, she has been on pristiq, zoloft, wellbutrin, prozac in the past with either AE or ineffectiveness   -discussed risk of discontinuing this medication without tapering  -patient was educated, advised of side effects, and all questions were answered.  Patient voiced understanding  -patient will follow up routinely and notify us if having any side effects or worsening or persistent symptoms.  ER precautions were given.         3. Severe obesity (BMI 35.0-39.9) with comorbidity  Overview:  General weight loss/lifestyle modification strategies discussed (elicit support from others; identify saboteurs; non-food rewards, etc).  Informal exercise measures discussed, e.g. taking stairs instead of elevator.  Regular aerobic exercise program discussed.            Visit today included increased complexity associated with the care of the episodic problem(s) addressed and managing the longitudinal care of the patient due to the serious and/or complex managed problem(s). See above assessment/plan.    Follow up in about 4 weeks (around 2/4/2025) for virtual f/u me.    Irasema Jaquez MD  _____________________________________________________________________________________________________________________________________________________    CC: depression/anxiety follow up    Patient is in clinic today as an established patient.    History of Present Illness  The patient is a 29-year-old female who presents for anxiety and depression  treatment.    She reports an inability to tolerate the side effects of Cymbalta, which have been progressively worsening. She experiences facial flushing, hot flashes, and mood swings, reminiscent of early menopausal symptoms. Her depression has also intensified. She recently discontinued her birth control due to a perceived exacerbation of her depressive symptoms around her menstrual cycle. She describes a heightened state of jealousy and irrationality during this period. She notes a slight improvement in her condition after discontinuing her birth control for a few days. She continues to experience excessive fatigue but reports improved sleep quality, with fewer awakenings at night. She finds Adderall beneficial, providing a boost in energy and motivation approximately 30 minutes to an hour after administration. She has been taking iron supplements with vitamin C, which she tolerates well.    MEDICATIONS  Current: Cymbalta, Adderall, iron with vitamin C  Past: Pristiq, Zoloft, Wellbutrin, Prozac     No other new complaints today.  Remaining chronic conditions have been reviewed and remain stable. Further detail as stated above.    Current Outpatient Medications on File Prior to Visit   Medication Sig Dispense Refill    azelastine (ASTELIN) 137 mcg (0.1 %) nasal spray 1 spray (137 mcg total) by Nasal route 2 (two) times daily. for 7 days 30 mL 0    cetirizine (ZYRTEC) 10 MG tablet TAKE 1 TABLET BY MOUTH EVERY DAY 90 tablet 3    cycloSPORINE (RESTASIS) 0.05 % ophthalmic emulsion Place 1 drop into both eyes 2 (two) times daily.      hydrOXYzine pamoate (VISTARIL) 25 MG Cap Take 1 capsule (25 mg total) by mouth every 8 (eight) hours as needed (itching). 30 capsule 0    meloxicam (MOBIC) 15 MG tablet Take 1 tablet (15 mg total) by mouth once daily. 30 tablet 5    metFORMIN (GLUCOPHAGE-XR) 500 MG ER 24hr tablet Take 1 tablet (500 mg total) by mouth 2 (two) times daily with meals. 180 tablet 3     "norethindrone-e.estradioL-iron (MINASTRIN 24 FE) 1 mg-20 mcg(24) /75 mg (4) Chew Take 1 tablet by mouth once daily.      ondansetron (ZOFRAN) 8 MG tablet TAKE 1 TABLET(8 MG) BY MOUTH EVERY 8 HOURS AS NEEDED 30 tablet 0    pantoprazole (PROTONIX) 40 MG tablet Take 40 mg by mouth once daily.      triamcinolone acetonide 0.1% (KENALOG) 0.1 % cream Apply topically 2 (two) times daily. 453.6 g 0     No current facility-administered medications on file prior to visit.       Review of Systems   Constitutional:  Negative for chills, diaphoresis, fatigue and fever.   HENT:  Negative for congestion, ear pain, postnasal drip, sinus pain and sore throat.    Eyes:  Negative for pain and redness.   Respiratory:  Negative for cough, chest tightness and shortness of breath.    Cardiovascular:  Negative for chest pain and leg swelling.   Gastrointestinal:  Negative for abdominal pain, constipation, diarrhea, nausea and vomiting.   Endocrine: Positive for heat intolerance.   Genitourinary:  Negative for dysuria and hematuria.   Musculoskeletal:  Negative for arthralgias and joint swelling.   Skin:  Negative for rash.   Neurological:  Negative for dizziness, syncope and headaches.   Psychiatric/Behavioral:  Positive for agitation and dysphoric mood. Negative for self-injury, sleep disturbance and suicidal ideas. The patient is not nervous/anxious.        Vitals:    01/07/25 1027   BP: 104/78   BP Location: Left arm   Patient Position: Sitting   Pulse: 105   Temp: 98.4 °F (36.9 °C)   SpO2: 99%   Weight: 94.3 kg (208 lb 0.1 oz)   Height: 5' 2" (1.575 m)       Wt Readings from Last 3 Encounters:   01/07/25 94.3 kg (208 lb 0.1 oz)   10/24/24 95.3 kg (210 lb)   09/25/24 96.3 kg (212 lb 6.6 oz)       Physical Exam  Constitutional:       General: She is not in acute distress.     Appearance: Normal appearance. She is well-developed. She is obese.   HENT:      Head: Normocephalic and atraumatic.   Eyes:      Conjunctiva/sclera: " Conjunctivae normal.   Cardiovascular:      Rate and Rhythm: Normal rate and regular rhythm.      Pulses: Normal pulses.      Heart sounds: Normal heart sounds. No murmur heard.  Pulmonary:      Effort: Pulmonary effort is normal. No respiratory distress.      Breath sounds: Normal breath sounds.   Abdominal:      General: Bowel sounds are normal. There is no distension.      Palpations: Abdomen is soft.      Tenderness: There is no abdominal tenderness.   Musculoskeletal:         General: Normal range of motion.      Cervical back: Normal range of motion and neck supple.   Skin:     General: Skin is warm and dry.      Findings: No rash.   Neurological:      General: No focal deficit present.      Mental Status: She is alert and oriented to person, place, and time.   Psychiatric:         Mood and Affect: Mood normal.         Behavior: Behavior normal.         Thought Content: Thought content normal.         Judgment: Judgment normal.     DISCLAIMER: This note was compiled by using a speech recognition dictation system and therefore please be aware that typographical / speech recognition errors can and do occur.  Please contact me if you see any errors specifically.  Consent was obtained for FLEX recording system prior to the visit.

## 2025-01-30 ENCOUNTER — CLINICAL SUPPORT (OUTPATIENT)
Dept: ALLERGY | Facility: CLINIC | Age: 30
End: 2025-01-30
Payer: COMMERCIAL

## 2025-01-30 ENCOUNTER — OFFICE VISIT (OUTPATIENT)
Dept: ENDOCRINOLOGY | Facility: CLINIC | Age: 30
End: 2025-01-30
Payer: COMMERCIAL

## 2025-01-30 VITALS
DIASTOLIC BLOOD PRESSURE: 86 MMHG | HEIGHT: 62 IN | SYSTOLIC BLOOD PRESSURE: 124 MMHG | BODY MASS INDEX: 37.85 KG/M2 | WEIGHT: 205.69 LBS

## 2025-01-30 DIAGNOSIS — E88.819 INSULIN RESISTANCE: ICD-10-CM

## 2025-01-30 DIAGNOSIS — J30.9 CHRONIC ALLERGIC RHINITIS: Primary | ICD-10-CM

## 2025-01-30 DIAGNOSIS — E66.01 SEVERE OBESITY (BMI 35.0-39.9) WITH COMORBIDITY: ICD-10-CM

## 2025-01-30 DIAGNOSIS — E04.2 MULTIPLE THYROID NODULES: ICD-10-CM

## 2025-01-30 DIAGNOSIS — E28.2 PCOS (POLYCYSTIC OVARIAN SYNDROME): Primary | ICD-10-CM

## 2025-01-30 PROCEDURE — 95115 IMMUNOTHERAPY ONE INJECTION: CPT | Mod: S$GLB,,, | Performed by: STUDENT IN AN ORGANIZED HEALTH CARE EDUCATION/TRAINING PROGRAM

## 2025-01-30 PROCEDURE — 3074F SYST BP LT 130 MM HG: CPT | Mod: CPTII,S$GLB,, | Performed by: INTERNAL MEDICINE

## 2025-01-30 PROCEDURE — 3079F DIAST BP 80-89 MM HG: CPT | Mod: CPTII,S$GLB,, | Performed by: INTERNAL MEDICINE

## 2025-01-30 PROCEDURE — 3008F BODY MASS INDEX DOCD: CPT | Mod: CPTII,S$GLB,, | Performed by: INTERNAL MEDICINE

## 2025-01-30 PROCEDURE — 99999 PR PBB SHADOW E&M-EST. PATIENT-LVL IV: CPT | Mod: PBBFAC,,, | Performed by: INTERNAL MEDICINE

## 2025-01-30 PROCEDURE — 1159F MED LIST DOCD IN RCRD: CPT | Mod: CPTII,S$GLB,, | Performed by: INTERNAL MEDICINE

## 2025-01-30 PROCEDURE — G2211 COMPLEX E/M VISIT ADD ON: HCPCS | Mod: S$GLB,,, | Performed by: INTERNAL MEDICINE

## 2025-01-30 PROCEDURE — 99999 PR PBB SHADOW E&M-EST. PATIENT-LVL I: CPT | Mod: PBBFAC,,,

## 2025-01-30 PROCEDURE — 99214 OFFICE O/P EST MOD 30 MIN: CPT | Mod: S$GLB,,, | Performed by: INTERNAL MEDICINE

## 2025-01-30 PROCEDURE — 1160F RVW MEDS BY RX/DR IN RCRD: CPT | Mod: CPTII,S$GLB,, | Performed by: INTERNAL MEDICINE

## 2025-01-30 RX ORDER — METFORMIN HYDROCHLORIDE 500 MG/1
500 TABLET, EXTENDED RELEASE ORAL 2 TIMES DAILY WITH MEALS
Qty: 180 TABLET | Refills: 3 | Status: SHIPPED | OUTPATIENT
Start: 2025-01-30 | End: 2026-01-30

## 2025-01-30 NOTE — PATIENT INSTRUCTIONS
Call your insurance and ask if they cover Wegovy or Zepbound for weight management  If not we will refer you to bariatric medicine

## 2025-01-30 NOTE — PROGRESS NOTES
Alyson Vila is a 29 y.o. female  presenting for follow-up of thyroid nodule, diagnosis of PCOS, insulin resistance, obesity    History of Present Illness  Thyroid nodule(s)    Patient presents with no concerns for thyroid or thyroid function.  Last thyroid ultrasound 10/2022 showing multiple sub-centimeter nodules.  TPO (+) 11/2022.  Patient denies ever taking LT4 supplementation.  History of normal TSH.       Thyroid US 10/2024:  COMPARISON:  10/31/2023     TECHNIQUE:  Grayscale and color Doppler sonographic images of the thyroid were obtained.     FINDINGS:  The isthmus measures 0.5 cm in thickness.  The right thyroid lobe has a homogeneous echotexture and measures approximately 6 x 1.7 x 2.5 cm (sagittal, AP and transverse, respectively).  Nodule right lower lobe: There is a Hyperechoic or isoechoic (1) Solid or almost completely solid (2) nodule measuring 8 x 5 x 5 mm.  Additionally, the nodule shows  Smooth (0) margins and No calcifications (0).  TR 3  Nodule in the right lower lobe: There is a Hypoechoic (2) Spongiform (0) nodule measuring 11 x 8 x 10.  Additionally, the nodule shows  Ill-defined (0) margins and No calcifications (0).  TR 2  The left thyroid lobe has a homogenous echotexture and measures approximately 5.5 x 1.5 x 1.9 cm (sagittal, AP and transverse, respectively).  Nodule in the left lower thyroid lobe: There is a Hyperechoic or isoechoic (1) Solid or almost completely solid (2) nodule measuring 12 x 8 x 7 mm.  Additionally, the nodule shows  Smooth (0) margins and No calcifications (0).  TR 3  No abnormal cervical lymph nodes noted.     Impression:  Redemonstration of the thyroid nodules.  No nodules meet criteria for FNA.     Risk Factors for Thyroid Cancer:  Hx of External Beam Radiation:denies  Family Hx of Thyroid Cancer:denies    Denies any compressive symptoms such as rapid neck enlargement, difficulty swallowing, SOB, or hoarseness.     Lab Results   Component Value Date    TSH  1.309 10/03/2024     With Regards to PCOS:    Has diagnosis of PCOS made after sister diagnosed. She blood work and US done at outside facility and told consistent.  Patient currently on OCP with regular cycles.  Reports that cramping pain with menstrual cycles has improved with OCP.  OGTT performed and patient placed on metformin for insulin resistance.  Was having GI issues on Metformin.  Switched to Ozempic.  Did well on Ozempic, but insurance stopped covering.  Now back on metformin with decreased GI issues.  Currently seeing dietician.  Has fallen off exercise over the holidays.  JEAN CARLOS-IR 2.6 on labs 11/2022.    Denies acne, hirsutism, acanthosis nigricans, excessive skin tags       Current Outpatient Medications:     cetirizine (ZYRTEC) 10 MG tablet, TAKE 1 TABLET BY MOUTH EVERY DAY, Disp: 90 tablet, Rfl: 3    cycloSPORINE (RESTASIS) 0.05 % ophthalmic emulsion, Place 1 drop into both eyes 2 (two) times daily., Disp: , Rfl:     dextroamphetamine-amphetamine (ADDERALL) 15 mg tablet, Take 1 tablet (15 mg total) by mouth 2 (two) times daily., Disp: 60 tablet, Rfl: 0    hydrOXYzine pamoate (VISTARIL) 25 MG Cap, Take 1 capsule (25 mg total) by mouth every 8 (eight) hours as needed (itching)., Disp: 30 capsule, Rfl: 0    meloxicam (MOBIC) 15 MG tablet, Take 1 tablet (15 mg total) by mouth once daily., Disp: 30 tablet, Rfl: 5    norethindrone-e.estradioL-iron (MINASTRIN 24 FE) 1 mg-20 mcg(24) /75 mg (4) Chew, Take 1 tablet by mouth once daily., Disp: , Rfl:     ondansetron (ZOFRAN) 8 MG tablet, TAKE 1 TABLET(8 MG) BY MOUTH EVERY 8 HOURS AS NEEDED, Disp: 30 tablet, Rfl: 0    pantoprazole (PROTONIX) 40 MG tablet, Take 40 mg by mouth once daily., Disp: , Rfl:     triamcinolone acetonide 0.1% (KENALOG) 0.1 % cream, Apply topically 2 (two) times daily., Disp: 453.6 g, Rfl: 0    venlafaxine (EFFEXOR-XR) 150 MG Cp24, Take 1 capsule (150 mg total) by mouth once daily., Disp: 30 capsule, Rfl: 1    azelastine (ASTELIN) 137 mcg  (0.1 %) nasal spray, 1 spray (137 mcg total) by Nasal route 2 (two) times daily. for 7 days, Disp: 30 mL, Rfl: 0    metFORMIN (GLUCOPHAGE-XR) 500 MG ER 24hr tablet, Take 1 tablet (500 mg total) by mouth 2 (two) times daily with meals., Disp: 180 tablet, Rfl: 3    ROS as above    Objective:     Vitals:    01/30/25 0855   BP: 124/86     Wt Readings from Last 3 Encounters:   01/30/25 0855 93.3 kg (205 lb 11 oz)   01/07/25 1027 94.3 kg (208 lb 0.1 oz)   10/24/24 0729 95.3 kg (210 lb)         Body mass index is 37.62 kg/m².  Physical Exam  Constitutional:       Appearance: She is well-developed.   HENT:      Head: Normocephalic.   Eyes:      Conjunctiva/sclera: Conjunctivae normal.   Neck:      Comments: Mild thyromegaly  Pulmonary:      Effort: Pulmonary effort is normal.   Musculoskeletal:         General: Normal range of motion.   Skin:     General: Skin is warm.      Findings: No rash.      Comments: No hirsutism  No acanthosis  No acne   Neurological:      Mental Status: She is alert and oriented to person, place, and time.         Labs    Chemistry        Component Value Date/Time     10/03/2024 0718    K 4.8 10/03/2024 0718     10/03/2024 0718    CO2 22 (L) 10/03/2024 0718    BUN 19 10/03/2024 0718    CREATININE 0.8 10/03/2024 0718    GLU 96 10/03/2024 0718        Component Value Date/Time    CALCIUM 9.0 10/03/2024 0718    ALKPHOS 53 (L) 10/03/2024 0718    AST 19 10/03/2024 0718    ALT 14 10/03/2024 0718    BILITOT 0.2 10/03/2024 0718              Assessment and Plan     Multiple thyroid nodules  US reviewed no nodules meet FNA criteria at this time.  Follow-up ultrasound indicated for clinical changes.  TSH at goal  +TPO.    Educated patient on clinical changes to monitor for in regards to underactive thyroid function.  Will continue to monitor TFT annual and with any clinical changes    PCOS (polycystic ovarian syndrome)  Labs and imaging not available and now on OCP  OGTT complete and JEAN CARLOS IR 2.6  indicating insulin resistance  Continue on Metformin 500 mg BID  Continue on OCP  Advised patient to reach out to OBGYN for other birth control options and continue diet and exercise regimen for PMDD symptoms.   No other hyperandrogenism symptoms present    Severe obesity (BMI 35.0-39.9) with comorbidity  Encouraged patient to continue work with dietician and start back with exercise routine  Patient to contact insurance company concerning coverage for weekly weight loss injectables  Referral for Bariatric medicine placed.      I have reviewed and concur with Ani Dow's history, physical, assessment, and plan.  I have personally interviewed and examined the patient.    Thyroid nodule- US stable, repeat 1-2 years  PCOS- on OCP. Discussed no role for testing hormone levels particularly while on OCP  Struggling with general sense of not feeling well and mental health. Does feel better when being mindful of diet and regular exercise. Working with nutritionist which she finds helps. Discussed resuming more consistent exercise routine and she plans to do this  Obesity/insulin resistance- on metformin and tolerating well. Would benefit greatly from resumption of GLP but insurance has been limiting factor. Asked her to look into insurance coverage of Wegovy/zepbound and if possible we will start  Also offered referral to bariatric med particularly if GLP not covered    Clover Navarrete MD    Visit today included increased complexity associated with the care of the problems addressed and managing the longitudinal care of the patient due to the serious and/or complex managed problems

## 2025-01-30 NOTE — ASSESSMENT & PLAN NOTE
Encouraged patient to continue work with dietician and start back with exercise routine  Patient to contact insurance company concerning coverage for weekly weight loss injectables  Referral for Bariatric medicine placed.

## 2025-01-30 NOTE — ASSESSMENT & PLAN NOTE
Labs and imaging not available and now on OCP  OGTT complete and JEAN CARLOS IR 2.6 indicating insulin resistance  Continue on Metformin 500 mg BID  Continue on OCP  Advised patient to reach out to OBGYN for other birth control options and continue diet and exercise regimen for PMDD symptoms.   No other hyperandrogenism symptoms present

## 2025-01-30 NOTE — ASSESSMENT & PLAN NOTE
US reviewed no nodules meet FNA criteria at this time.  Follow-up ultrasound indicated for clinical changes.  TSH at goal  +TPO.    Educated patient on clinical changes to monitor for in regards to underactive thyroid function.  Will continue to monitor TFT annual and with any clinical changes

## 2025-02-04 ENCOUNTER — CLINICAL SUPPORT (OUTPATIENT)
Dept: ALLERGY | Facility: CLINIC | Age: 30
End: 2025-02-04
Payer: COMMERCIAL

## 2025-02-04 DIAGNOSIS — J30.9 CHRONIC ALLERGIC RHINITIS: Primary | ICD-10-CM

## 2025-02-04 PROCEDURE — 99999 PR PBB SHADOW E&M-EST. PATIENT-LVL I: CPT | Mod: PBBFAC,,,

## 2025-02-04 PROCEDURE — 95115 IMMUNOTHERAPY ONE INJECTION: CPT | Mod: S$GLB,,, | Performed by: STUDENT IN AN ORGANIZED HEALTH CARE EDUCATION/TRAINING PROGRAM

## 2025-02-05 ENCOUNTER — OFFICE VISIT (OUTPATIENT)
Dept: PRIMARY CARE CLINIC | Facility: CLINIC | Age: 30
End: 2025-02-05
Payer: COMMERCIAL

## 2025-02-05 DIAGNOSIS — F32.1 MODERATE SINGLE CURRENT EPISODE OF MAJOR DEPRESSIVE DISORDER: Primary | ICD-10-CM

## 2025-02-05 PROCEDURE — 1159F MED LIST DOCD IN RCRD: CPT | Mod: CPTII,95,, | Performed by: INTERNAL MEDICINE

## 2025-02-05 PROCEDURE — 98005 SYNCH AUDIO-VIDEO EST LOW 20: CPT | Mod: 95,,, | Performed by: INTERNAL MEDICINE

## 2025-02-05 PROCEDURE — 1160F RVW MEDS BY RX/DR IN RCRD: CPT | Mod: CPTII,95,, | Performed by: INTERNAL MEDICINE

## 2025-02-10 ENCOUNTER — OFFICE VISIT (OUTPATIENT)
Dept: ALLERGY | Facility: CLINIC | Age: 30
End: 2025-02-10
Payer: COMMERCIAL

## 2025-02-10 ENCOUNTER — TELEPHONE (OUTPATIENT)
Dept: ALLERGY | Facility: CLINIC | Age: 30
End: 2025-02-10
Payer: COMMERCIAL

## 2025-02-10 VITALS — HEIGHT: 62 IN | BODY MASS INDEX: 37.77 KG/M2 | WEIGHT: 205.25 LBS

## 2025-02-10 DIAGNOSIS — Z51.6 ALLERGY DESENSITIZATION THERAPY: ICD-10-CM

## 2025-02-10 DIAGNOSIS — H10.13 ALLERGIC CONJUNCTIVITIS, BILATERAL: ICD-10-CM

## 2025-02-10 DIAGNOSIS — J30.9 CHRONIC ALLERGIC RHINITIS: Primary | ICD-10-CM

## 2025-02-10 DIAGNOSIS — L50.8 CHRONIC URTICARIA: ICD-10-CM

## 2025-02-10 PROCEDURE — 99999 PR PBB SHADOW E&M-EST. PATIENT-LVL III: CPT | Mod: PBBFAC,,, | Performed by: ALLERGY & IMMUNOLOGY

## 2025-02-10 PROCEDURE — 99215 OFFICE O/P EST HI 40 MIN: CPT | Mod: S$GLB,,, | Performed by: ALLERGY & IMMUNOLOGY

## 2025-02-10 PROCEDURE — 3008F BODY MASS INDEX DOCD: CPT | Mod: CPTII,S$GLB,, | Performed by: ALLERGY & IMMUNOLOGY

## 2025-02-10 PROCEDURE — 1159F MED LIST DOCD IN RCRD: CPT | Mod: CPTII,S$GLB,, | Performed by: ALLERGY & IMMUNOLOGY

## 2025-02-10 PROCEDURE — 1160F RVW MEDS BY RX/DR IN RCRD: CPT | Mod: CPTII,S$GLB,, | Performed by: ALLERGY & IMMUNOLOGY

## 2025-02-10 NOTE — PROGRESS NOTES
Subjective:       Patient ID: Alyson Vila is a 29 y.o. female.    Chief Complaint:  Annual Exam      28 yo woman presents for continued evaluation of allergic rhinitis and conjunctivitis ad chronic urticaria. She was last seen 3/27/2024. She had immunocaps then with positives to cats and grass. She was taking zyrtec 10 daily to BID. This was helping hives but still with rhinitis with sneeze, runny nose, congestion, itchy watery eye all the time so started IT. She gets 1 injection in left with C/GR. She started 4/23/2024 and progressed well. She reached maintenance 2/4/2025. She has had no reactions to shots, no itch, no hives, no swelling, no SOB, no wheeze, no dizziness. She has noticed decrease in itchy eyes and sneeze  She definitely feels shots are working well. No recent hives. No new medical issues. Still on zyrtec dialy      Prior History taken 2/21/2024: consult from [facility] for hives. She states 1/29 she broke out in hives- red raised and very itchy. Would come and go but had every day for 2 weeks and getting worse. She did see a pig at vet clinic and thought that may be cause but continued to get worse. 2/8 was seen by UC and given steroid shot and oral steroid which helped some but still with itch and few hives. Now seem to be gone. Was worse at her apartment so she deep cleaned and not sure if that helped. She does live and work with cats and now wonders if is trigger. Flu was going around work and she did have right before hives. She has rhinitis with sneeze, runny nose, congestion, itchy watery eye so takes zyrtec daily. Was on shots in past for about 18 months and helped. She is allergic to grass and ragweed. No asthma. No eczema. No food, insect or latex allergy. She has Hashimoto thyroiditis and insulin resistance.         Environmental History: see history section for home environment  Review of Systems   Constitutional:  Negative for chills and fever.   HENT:  Positive for  congestion, postnasal drip, rhinorrhea and sneezing. Negative for ear pain, facial swelling, nosebleeds and sinus pressure.    Eyes:  Positive for discharge and itching. Negative for redness.   Respiratory:  Negative for cough, chest tightness, shortness of breath and wheezing.    Skin:  Positive for color change and rash.        Objective:      Physical Exam  Vitals and nursing note reviewed.   Constitutional:       General: She is not in acute distress.     Appearance: Normal appearance. She is not ill-appearing.   HENT:      Nose: No rhinorrhea.   Eyes:      General:         Right eye: No discharge.         Left eye: No discharge.      Conjunctiva/sclera: Conjunctivae normal.   Pulmonary:      Effort: Pulmonary effort is normal. No respiratory distress.   Abdominal:      General: There is no distension.   Skin:     General: Skin is warm and dry.      Findings: No erythema or rash.   Neurological:      Mental Status: She is alert and oriented to person, place, and time.   Psychiatric:         Mood and Affect: Mood normal.         Behavior: Behavior normal.         Laboratory:   none performed   Assessment:       1. Chronic allergic rhinitis    2. Allergic conjunctivitis, bilateral    3. Allergy desensitization therapy    4. Chronic urticaria         Plan:       Urticaria- advised is immune mediated, discussed in detail. If flare can take zyrtec up to 20 mg BID  Rhinitis- cat avoidance. continue cetirizine 10 mg daily and can increase to BID as needed  continue allergy shots for 3-5 years so at least until 2/2028.  All risks and benefits discussed.  Protocol discussed in detail including need to remain in clinic for 30 minutes after injections.  All questions answered.  Patient advised to remain off beta blockers while on allergy shots and to notify injection clinic and MD of any new medications starts.      I spent a total of 40 minutes on the day of the visit.  This includes face to face time and non-face to face  time preparing to see the patient (eg, review of tests), obtaining and/or reviewing separately obtained history, documenting clinical information in the electronic or other health record, independently interpreting results and communicating results to the patient/family/caregiver, or care coordinator.

## 2025-02-10 NOTE — TELEPHONE ENCOUNTER
Spoke to pt and advised must arrive no later than 4:15 for 4 pm appt.        ----- Message from Zuly sent at 2/10/2025  4:00 PM CST -----  Contact: PT  Type: Needs Medical Advice    Who Called: PT  Best Call Back Number: [phone]  Additional  Information: PT called said she is on the way ETA is 4:15 appt is at 4 pm this  afternoon  Please Advise- Thank you

## 2025-02-12 NOTE — PROGRESS NOTES
Primary Care Telemedicine Note  The patient location is:  Patient Home   The chief complaint leading to consultation is: anxiety/depression follow up  Total time spent with patient: 10 min    Visit type: Virtual visit with synchronous audio and video  Each patient to whom he or she provides medical services by telemedicine is:  (1) informed of the relationship between the physician and patient and the respective role of any other health care provider with respect to management of the patient; and (2) notified that he or she may decline to receive medical services by telemedicine and may withdraw from such care at any time.    Assessment/Plan:    1. Moderate single current episode of major depressive disorder  Overview:  -chronic  -recent switch from cymbalta -> effexor due to continued symptoms  -initial adverse effects with effexor but now reporting improvement of symptoms and she is pleased with effects of effexor  -plan to continue at current dose  -of note, she has been on pristiq, zoloft, wellbutrin, prozac in the past with either AE or ineffectiveness   -discussed risk of discontinuing this medication without tapering  -patient was educated, advised of side effects, and all questions were answered.  Patient voiced understanding  -patient will follow up routinely and notify us if having any side effects or worsening or persistent symptoms.  ER precautions were given.             Visit today included increased complexity associated with the care of the episodic problem(s) addressed and managing the longitudinal care of the patient due to the serious and/or complex managed problem(s). See above assessment/plan.    Follow up if symptoms worsen or fail to improve.    Irasema Jaquez MD  _____________________________________________________________________________________________________________________________________________________    HPI:    Patient is an established patient who presents today via virtual  visit.    History of Present Illness  Patient last visit was one month ago. She had presented with worsening depression/anxiety symptoms. She was previously on cymbalta and switched at that visit to effexor. She reports that initially she felt that her anxiety was worse with the effexor but over the past week, she reports significant improvement of her symptoms. She denies any other AE of the medication. She would like to continue effexor at this time.      No other new complaints today.      Past Medical History:  Past Medical History:   Diagnosis Date    Insulin resistance     PCOS (polycystic ovarian syndrome)     Thyroid disease     Hashimoto's       Current Outpatient Medications on File Prior to Visit   Medication Sig Dispense Refill    azelastine (ASTELIN) 137 mcg (0.1 %) nasal spray 1 spray (137 mcg total) by Nasal route 2 (two) times daily. for 7 days 30 mL 0    cetirizine (ZYRTEC) 10 MG tablet TAKE 1 TABLET BY MOUTH EVERY DAY 90 tablet 3    cycloSPORINE (RESTASIS) 0.05 % ophthalmic emulsion Place 1 drop into both eyes 2 (two) times daily.      dextroamphetamine-amphetamine (ADDERALL) 15 mg tablet Take 1 tablet (15 mg total) by mouth 2 (two) times daily. 60 tablet 0    hydrOXYzine pamoate (VISTARIL) 25 MG Cap Take 1 capsule (25 mg total) by mouth every 8 (eight) hours as needed (itching). 30 capsule 0    meloxicam (MOBIC) 15 MG tablet Take 1 tablet (15 mg total) by mouth once daily. 30 tablet 5    metFORMIN (GLUCOPHAGE-XR) 500 MG ER 24hr tablet Take 1 tablet (500 mg total) by mouth 2 (two) times daily with meals. 180 tablet 3    norethindrone-e.estradioL-iron (MINASTRIN 24 FE) 1 mg-20 mcg(24) /75 mg (4) Chew Take 1 tablet by mouth once daily.      ondansetron (ZOFRAN) 8 MG tablet TAKE 1 TABLET(8 MG) BY MOUTH EVERY 8 HOURS AS NEEDED 30 tablet 0    triamcinolone acetonide 0.1% (KENALOG) 0.1 % cream Apply topically 2 (two) times daily. 453.6 g 0    venlafaxine (EFFEXOR-XR) 150 MG Cp24 Take 1 capsule (150 mg  total) by mouth once daily. 30 capsule 1     No current facility-administered medications on file prior to visit.       Review of Systems   Constitutional:  Negative for chills, fever and malaise/fatigue.   HENT:  Negative for hearing loss.    Eyes:  Negative for discharge.   Respiratory:  Negative for cough, shortness of breath and wheezing.    Cardiovascular:  Negative for chest pain, palpitations and leg swelling.   Gastrointestinal:  Negative for abdominal pain, blood in stool, constipation, diarrhea and vomiting.   Genitourinary:  Negative for dysuria, frequency and hematuria.   Musculoskeletal:  Negative for back pain, joint pain and neck pain.   Neurological:  Negative for weakness and headaches.   Psychiatric/Behavioral:  Negative for depression. The patient is not nervous/anxious.          Physical Exam   There were no vitals filed for this visit.   .FLOWAMB[14   BMI Readings from Last 1 Encounters:   02/10/25 37.54 kg/m²       Physical Exam  Constitutional:       General: She is not in acute distress.     Appearance: She is well-developed.   HENT:      Head: Normocephalic and atraumatic.   Pulmonary:      Effort: Pulmonary effort is normal. No respiratory distress.   Abdominal:      General: There is no distension.   Musculoskeletal:         General: Normal range of motion.      Cervical back: Normal range of motion.   Neurological:      Mental Status: She is alert and oriented to person, place, and time.   Psychiatric:         Mood and Affect: Mood normal.         Behavior: Behavior normal.         Thought Content: Thought content normal.         Judgment: Judgment normal.           DISCLAIMER: This note was compiled by using a speech recognition dictation system and therefore please be aware that typographical / speech recognition errors can and do occur.  Please contact me if you see any errors specifically.  Consent was obtained for [employer] recording system prior to the visit.  Answers submitted by the  patient for this visit:  Review of Systems Questionnaire (Submitted on 2/5/2025)  activity change: No  unexpected weight change: No  rhinorrhea: No  trouble swallowing: No  visual disturbance: No  chest tightness: No  polyuria: No  difficulty urinating: No  menstrual problem: No  joint swelling: No  arthralgias: No  confusion: No  dysphoric mood: Yes

## 2025-02-21 ENCOUNTER — PATIENT MESSAGE (OUTPATIENT)
Dept: PRIMARY CARE CLINIC | Facility: CLINIC | Age: 30
End: 2025-02-21
Payer: COMMERCIAL

## 2025-02-27 PROCEDURE — 95165 ANTIGEN THERAPY SERVICES: CPT | Mod: S$GLB,,, | Performed by: ALLERGY & IMMUNOLOGY

## 2025-03-09 DIAGNOSIS — F41.1 GAD (GENERALIZED ANXIETY DISORDER): ICD-10-CM

## 2025-03-09 NOTE — TELEPHONE ENCOUNTER
No care due was identified.  [facility] Embedded Care Due Messages. Reference number: [id number].   3/09/2025 8:18:09 AM CDT

## 2025-03-10 ENCOUNTER — PATIENT MESSAGE (OUTPATIENT)
Dept: PRIMARY CARE CLINIC | Facility: CLINIC | Age: 30
End: 2025-03-10
Payer: COMMERCIAL

## 2025-03-10 DIAGNOSIS — F98.8 ATTENTION DEFICIT DISORDER (ADD) WITHOUT HYPERACTIVITY: ICD-10-CM

## 2025-03-10 RX ORDER — VENLAFAXINE HYDROCHLORIDE 150 MG/1
CAPSULE, EXTENDED RELEASE ORAL
Qty: 90 CAPSULE | Refills: 3 | Status: SHIPPED | OUTPATIENT
Start: 2025-03-10

## 2025-03-10 NOTE — TELEPHONE ENCOUNTER
No care due was identified.  [facility] Embedded Care Due Messages. Reference number: [id number].   3/10/2025 10:54:00 AM CDT

## 2025-03-10 NOTE — TELEPHONE ENCOUNTER
Refill Routing Note   Medication(s) are not appropriate for processing by [facility] for the following reason(s):        New or recently adjusted medication    [facility] action(s):  Defer        Medication Therapy Plan: New start (1/7/25)      Appointments  past 12m or future 3m with PCP    Date Provider   Last Visit   2/5/2025 Irasema Jauqez MD   Next Visit   Visit date not found Irasema Jaquez MD   ED visits in past 90 days: 0        Note composed:6:47 AM 03/10/2025

## 2025-03-11 ENCOUNTER — CLINICAL SUPPORT (OUTPATIENT)
Dept: ALLERGY | Facility: CLINIC | Age: 30
End: 2025-03-11
Payer: COMMERCIAL

## 2025-03-11 DIAGNOSIS — J30.9 CHRONIC ALLERGIC RHINITIS: Primary | ICD-10-CM

## 2025-03-11 PROCEDURE — 95115 IMMUNOTHERAPY ONE INJECTION: CPT | Mod: S$GLB,,, | Performed by: STUDENT IN AN ORGANIZED HEALTH CARE EDUCATION/TRAINING PROGRAM

## 2025-03-11 PROCEDURE — 99999 PR PBB SHADOW E&M-EST. PATIENT-LVL I: CPT | Mod: PBBFAC,,,

## 2025-03-11 RX ORDER — DEXTROAMPHETAMINE SACCHARATE, AMPHETAMINE ASPARTATE, DEXTROAMPHETAMINE SULFATE AND AMPHETAMINE SULFATE 3.75; 3.75; 3.75; 3.75 MG/1; MG/1; MG/1; MG/1
15 TABLET ORAL 2 TIMES DAILY
Qty: 60 TABLET | Refills: 0 | Status: SHIPPED | OUTPATIENT
Start: 2025-03-11 | End: 2025-04-10

## 2025-04-15 ENCOUNTER — PATIENT MESSAGE (OUTPATIENT)
Dept: ALLERGY | Facility: CLINIC | Age: 30
End: 2025-04-15
Payer: COMMERCIAL

## 2025-04-22 ENCOUNTER — CLINICAL SUPPORT (OUTPATIENT)
Dept: ALLERGY | Facility: CLINIC | Age: 30
End: 2025-04-22
Payer: COMMERCIAL

## 2025-04-22 DIAGNOSIS — J30.9 CHRONIC ALLERGIC RHINITIS: Primary | ICD-10-CM

## 2025-04-22 PROCEDURE — 99999 PR PBB SHADOW E&M-EST. PATIENT-LVL I: CPT | Mod: PBBFAC,,,

## 2025-04-22 PROCEDURE — 95115 IMMUNOTHERAPY ONE INJECTION: CPT | Mod: S$GLB,,, | Performed by: STUDENT IN AN ORGANIZED HEALTH CARE EDUCATION/TRAINING PROGRAM

## 2025-04-29 ENCOUNTER — CLINICAL SUPPORT (OUTPATIENT)
Dept: ALLERGY | Facility: CLINIC | Age: 30
End: 2025-04-29
Payer: COMMERCIAL

## 2025-04-29 DIAGNOSIS — J30.9 CHRONIC ALLERGIC RHINITIS: Primary | ICD-10-CM

## 2025-04-29 PROCEDURE — 99999 PR PBB SHADOW E&M-EST. PATIENT-LVL I: CPT | Mod: PBBFAC,,,

## 2025-04-29 PROCEDURE — 95115 IMMUNOTHERAPY ONE INJECTION: CPT | Mod: S$GLB,,, | Performed by: STUDENT IN AN ORGANIZED HEALTH CARE EDUCATION/TRAINING PROGRAM

## 2025-04-30 ENCOUNTER — OFFICE VISIT (OUTPATIENT)
Dept: FAMILY MEDICINE | Facility: CLINIC | Age: 30
End: 2025-04-30
Payer: COMMERCIAL

## 2025-04-30 DIAGNOSIS — F98.8 ATTENTION DEFICIT DISORDER (ADD) WITHOUT HYPERACTIVITY: Primary | ICD-10-CM

## 2025-04-30 DIAGNOSIS — F33.0 MILD EPISODE OF RECURRENT MAJOR DEPRESSIVE DISORDER: ICD-10-CM

## 2025-04-30 PROCEDURE — 98006 SYNCH AUDIO-VIDEO EST MOD 30: CPT | Mod: 95,,, | Performed by: PHYSICIAN ASSISTANT

## 2025-04-30 PROCEDURE — G2211 COMPLEX E/M VISIT ADD ON: HCPCS | Mod: 95,,, | Performed by: PHYSICIAN ASSISTANT

## 2025-04-30 PROCEDURE — 1160F RVW MEDS BY RX/DR IN RCRD: CPT | Mod: CPTII,95,, | Performed by: PHYSICIAN ASSISTANT

## 2025-04-30 PROCEDURE — 1159F MED LIST DOCD IN RCRD: CPT | Mod: CPTII,95,, | Performed by: PHYSICIAN ASSISTANT

## 2025-04-30 RX ORDER — DEXTROAMPHETAMINE SACCHARATE, AMPHETAMINE ASPARTATE, DEXTROAMPHETAMINE SULFATE AND AMPHETAMINE SULFATE 3.75; 3.75; 3.75; 3.75 MG/1; MG/1; MG/1; MG/1
15 TABLET ORAL 2 TIMES DAILY
Qty: 60 TABLET | Refills: 0 | Status: SHIPPED | OUTPATIENT
Start: 2025-04-30 | End: 2025-05-30

## 2025-04-30 RX ORDER — DEXTROAMPHETAMINE SACCHARATE, AMPHETAMINE ASPARTATE, DEXTROAMPHETAMINE SULFATE AND AMPHETAMINE SULFATE 3.75; 3.75; 3.75; 3.75 MG/1; MG/1; MG/1; MG/1
15 TABLET ORAL 2 TIMES DAILY
Qty: 60 TABLET | Refills: 0 | Status: SHIPPED | OUTPATIENT
Start: 2025-05-30 | End: 2025-06-29

## 2025-04-30 RX ORDER — DEXTROAMPHETAMINE SACCHARATE, AMPHETAMINE ASPARTATE, DEXTROAMPHETAMINE SULFATE AND AMPHETAMINE SULFATE 3.75; 3.75; 3.75; 3.75 MG/1; MG/1; MG/1; MG/1
15 TABLET ORAL 2 TIMES DAILY
Qty: 60 TABLET | Refills: 0 | Status: SHIPPED | OUTPATIENT
Start: 2025-06-29 | End: 2025-07-29

## 2025-04-30 RX ORDER — VENLAFAXINE HYDROCHLORIDE 75 MG/1
75 CAPSULE, EXTENDED RELEASE ORAL DAILY
Qty: 90 CAPSULE | Refills: 3 | Status: SHIPPED | OUTPATIENT
Start: 2025-04-30 | End: 2026-04-30

## 2025-05-06 ENCOUNTER — CLINICAL SUPPORT (OUTPATIENT)
Dept: ALLERGY | Facility: CLINIC | Age: 30
End: 2025-05-06
Payer: COMMERCIAL

## 2025-05-06 DIAGNOSIS — J30.9 CHRONIC ALLERGIC RHINITIS: Primary | ICD-10-CM

## 2025-05-06 PROCEDURE — 95115 IMMUNOTHERAPY ONE INJECTION: CPT | Mod: S$GLB,,, | Performed by: STUDENT IN AN ORGANIZED HEALTH CARE EDUCATION/TRAINING PROGRAM

## 2025-05-06 PROCEDURE — 99999 PR PBB SHADOW E&M-EST. PATIENT-LVL I: CPT | Mod: PBBFAC,,,

## 2025-05-13 ENCOUNTER — CLINICAL SUPPORT (OUTPATIENT)
Dept: ALLERGY | Facility: CLINIC | Age: 30
End: 2025-05-13
Payer: COMMERCIAL

## 2025-05-13 DIAGNOSIS — J30.9 CHRONIC ALLERGIC RHINITIS: Primary | ICD-10-CM

## 2025-05-13 PROCEDURE — 99999 PR PBB SHADOW E&M-EST. PATIENT-LVL I: CPT | Mod: PBBFAC,,,

## 2025-05-13 PROCEDURE — 95115 IMMUNOTHERAPY ONE INJECTION: CPT | Mod: S$GLB,,, | Performed by: STUDENT IN AN ORGANIZED HEALTH CARE EDUCATION/TRAINING PROGRAM

## 2025-05-20 ENCOUNTER — CLINICAL SUPPORT (OUTPATIENT)
Dept: ALLERGY | Facility: CLINIC | Age: 30
End: 2025-05-20
Payer: COMMERCIAL

## 2025-05-20 DIAGNOSIS — J30.9 CHRONIC ALLERGIC RHINITIS: Primary | ICD-10-CM

## 2025-05-20 PROCEDURE — 99999 PR PBB SHADOW E&M-EST. PATIENT-LVL I: CPT | Mod: PBBFAC,,,

## 2025-05-20 PROCEDURE — 95115 IMMUNOTHERAPY ONE INJECTION: CPT | Mod: S$GLB,,, | Performed by: STUDENT IN AN ORGANIZED HEALTH CARE EDUCATION/TRAINING PROGRAM

## 2025-06-17 ENCOUNTER — CLINICAL SUPPORT (OUTPATIENT)
Dept: ALLERGY | Facility: CLINIC | Age: 30
End: 2025-06-17
Payer: COMMERCIAL

## 2025-06-17 DIAGNOSIS — J30.9 CHRONIC ALLERGIC RHINITIS: Primary | ICD-10-CM

## 2025-06-17 PROCEDURE — 95115 IMMUNOTHERAPY ONE INJECTION: CPT | Mod: S$GLB,,, | Performed by: STUDENT IN AN ORGANIZED HEALTH CARE EDUCATION/TRAINING PROGRAM

## 2025-06-17 PROCEDURE — 99999 PR PBB SHADOW E&M-EST. PATIENT-LVL I: CPT | Mod: PBBFAC,,,

## 2025-06-18 ENCOUNTER — PATIENT MESSAGE (OUTPATIENT)
Dept: FAMILY MEDICINE | Facility: CLINIC | Age: 30
End: 2025-06-18
Payer: COMMERCIAL

## 2025-06-18 ENCOUNTER — TELEPHONE (OUTPATIENT)
Dept: FAMILY MEDICINE | Facility: CLINIC | Age: 30
End: 2025-06-18
Payer: COMMERCIAL

## 2025-06-18 NOTE — TELEPHONE ENCOUNTER
Attempted to call pt , nj answer, I was not able to leave a voice message , I sent hr a portal message

## 2025-06-30 ENCOUNTER — PATIENT MESSAGE (OUTPATIENT)
Dept: FAMILY MEDICINE | Facility: CLINIC | Age: 30
End: 2025-06-30
Payer: COMMERCIAL

## 2025-07-01 ENCOUNTER — HOSPITAL ENCOUNTER (OUTPATIENT)
Dept: RADIOLOGY | Facility: HOSPITAL | Age: 30
Discharge: HOME | End: 2025-07-01
Attending: PHYSICIAN ASSISTANT
Payer: COMMERCIAL

## 2025-07-01 ENCOUNTER — OFFICE VISIT (OUTPATIENT)
Dept: FAMILY MEDICINE | Facility: CLINIC | Age: 30
End: 2025-07-01
Payer: COMMERCIAL

## 2025-07-01 VITALS
RESPIRATION RATE: 14 BRPM | BODY MASS INDEX: 37.97 KG/M2 | WEIGHT: 206.31 LBS | OXYGEN SATURATION: 99 % | HEIGHT: 62 IN | DIASTOLIC BLOOD PRESSURE: 85 MMHG | SYSTOLIC BLOOD PRESSURE: 120 MMHG | HEART RATE: 82 BPM

## 2025-07-01 DIAGNOSIS — R13.10 DYSPHAGIA, UNSPECIFIED TYPE: Primary | ICD-10-CM

## 2025-07-01 DIAGNOSIS — E04.2 MULTIPLE THYROID NODULES: ICD-10-CM

## 2025-07-01 DIAGNOSIS — R22.1 NECK SWELLING: ICD-10-CM

## 2025-07-01 DIAGNOSIS — F33.0 MILD EPISODE OF RECURRENT MAJOR DEPRESSIVE DISORDER: ICD-10-CM

## 2025-07-01 DIAGNOSIS — R13.10 DYSPHAGIA, UNSPECIFIED TYPE: ICD-10-CM

## 2025-07-01 DIAGNOSIS — K21.9 GASTROESOPHAGEAL REFLUX DISEASE, UNSPECIFIED WHETHER ESOPHAGITIS PRESENT: ICD-10-CM

## 2025-07-01 DIAGNOSIS — M25.50 POLYARTHRALGIA: ICD-10-CM

## 2025-07-01 PROCEDURE — 99999 PR PBB SHADOW E&M-EST. PATIENT-LVL V: CPT | Mod: PBBFAC,,, | Performed by: PHYSICIAN ASSISTANT

## 2025-07-01 PROCEDURE — 76536 US EXAM OF HEAD AND NECK: CPT | Mod: 26,,, | Performed by: RADIOLOGY

## 2025-07-01 PROCEDURE — 76536 US EXAM OF HEAD AND NECK: CPT | Mod: TC,PO

## 2025-07-01 NOTE — PROGRESS NOTES
Assessment/Plan:    1. Dysphagia, unspecified type  Overview:  -reports new onset dysphagia and neck swelling  -no alarm symptoms or signs present on exam  -will obtain thyroid US due to hx of thyroid nodules   -consider ENT vs. GI referral in the future if needed  -ER precautions for severe or worsening of symptoms    Orders:  -     US Thyroid; Future; Expected date: 07/01/2025    2. Neck swelling  -     US Thyroid; Future; Expected date: 07/01/2025    3. Multiple thyroid nodules  Overview:  -US Nov 2022- multiple nodules, none meeting FNA criteria  -repeat US Nov 2023- slight enlargement of thyroid nodule; 1 yr f/u recommended  -repeat US Oct 2024-stable  -last thyroid studies wnl    Orders:  -     US Thyroid; Future; Expected date: 07/01/2025  -     TSH; Future; Expected date: 07/01/2025    4. Mild episode of recurrent major depressive disorder  Overview:  -chronic  -recent switch from cymbalta -> effexor due to continued symptoms  -initial adverse effects with effexor but now reporting improvement of symptoms   -of note, she has been on pristiq, zoloft, wellbutrin, prozac in the past with either AE or ineffectiveness   -discussed risk of discontinuing this medication without tapering  -patient was educated, advised of side effects, and all questions were answered. Patient voiced understanding  -patient will follow up routinely and notify us if having any side effects or worsening or persistent symptoms. ER precautions were given.    5. Gastroesophageal reflux disease, unspecified whether esophagitis present  Overview:  -symptoms controlled with PRN PPI  -denies alarm symptoms, such as dysphagia, weight loss or N/V  -continue lifestyle modification with avoidance of acidic foods, caffeine, late night eating    6. Polyarthralgia  -     MARIO ALBERTO Screen w/Reflex; Future; Expected date: 07/01/2025  -     C-Reactive Protein; Future; Expected date: 07/01/2025  -     Cyclic Citrullinated Peptide Antibody, IgG; Future;  "Expected date: 07/01/2025  -     Rheumatoid Factor; Future; Expected date: 07/01/2025  -     Sedimentation rate; Future; Expected date: 07/01/2025  -     Uric Acid; Future; Expected date: 07/01/2025    Visit today included increased complexity associated with the care of the episodic problem addressed and managing the longitudinal care of the patient due to the serious and/or complex managed problem(s).    Follow up in about 1 month (around 8/1/2025), or if symptoms worsen or fail to improve.    Brittani Urena PA-C  _____________________________________________________________________________________________________________________________________________________    CC: trouble swallowing    HPI: Patient is in clinic today as an established patient here for trouble swallowing.     HISTORY OF PRESENT ILLNESS:  She reports trouble swallowing that started yesterday and continued this morning, though symptoms are less severe today. The difficulty swallowing was present all day yesterday. She reports throat swelling and discomfort localized near her thyroid gland but emphasizes it is not like a typical sore throat. She denies fever, upper respiratory symptoms, or pain with swallowing.    MEDICAL HISTORY:  She has a history of thyroid nodules and Hashimoto's thyroiditis. Recent thyroid ultrasound in October showed multiple nodules, none large enough to require biopsy. She currently denies taking any thyroid medications.    MUSCULOSKELETAL:  She reports increasing joint soreness affecting bilateral knees, ankles, and hips after normal daily activities, requiring application of rags to affected joints after work. The pain does not correlate with excessive physical exertion. Joint issues have been present since middle school, previously dismissed as "growing pains". At age 29, she expresses concern about the widespread nature of symptoms and suspects a possible connection to Hashimoto's or another underlying autoimmune " condition. Previous knee X-rays and autoimmune labs in 2012 were unremarkable.    DEPRESSION AND FATIGUE:  She reports minimal improvement in depression symptoms despite medication adjustment. She experiences significant fatigue characterized by profound exhaustion despite having motivation. After approximately one hour of activity such as cleaning, she becomes extremely tired and returns to bed. She has been taking iron supplements without noticeable improvement in energy levels. Recent lab work showed borderline results. She is uncertain whether her exhaustion is related to depression or another underlying cause.    CURRENT MEDICATIONS:  Protonix as needed for reflux symptoms, Effexor 225 mg for depression management, Iron supplements    No other complaints today.     Past Medical History:   Diagnosis Date    Insulin resistance     PCOS (polycystic ovarian syndrome)     Thyroid disease     Hashimoto's     Past Surgical History:   Procedure Laterality Date    COLONOSCOPY N/A 12/06/2023    Procedure: COLONOSCOPY;  Surgeon: Ken Diaz Jr., MD;  Location: [facility];  Service: Endoscopy;  Laterality: N/A; ; Repeat colonoscopy at age 46 for screening    COLONOSCOPY N/A 12/04/2023    not completed.    ESOPHAGOGASTRODUODENOSCOPY N/A 01/24/2024    Procedure: EGD (ESOPHAGOGASTRODUODENOSCOPY);  Surgeon: Ken Diaz Jr., MD;  Location: [facility];  Service: Endoscopy;  Laterality: N/A;     Review of patient's allergies indicates:   Allergen Reactions    Grass pollen-sweet vernal grass, std. Itching and Tinitus    Ragweed Itching and Tinitus     Social History[1]  Family History   Problem Relation Name Age of Onset    Alcohol abuse Mother Nathalie     Cervical cancer Mother Nathalie     Colon cancer Neg Hx      Crohn's disease Neg Hx      Esophageal cancer Neg Hx      Ulcerative colitis Neg Hx      Stomach cancer Neg Hx       Medications Ordered Prior to Encounter[2]    Review of Systems   Constitutional:  Negative for  "chills, diaphoresis, fatigue and fever.   HENT:  Positive for trouble swallowing. Negative for congestion, ear pain, postnasal drip, sinus pain and sore throat.    Eyes:  Negative for pain and redness.   Respiratory:  Negative for cough, chest tightness and shortness of breath.    Cardiovascular:  Negative for chest pain and leg swelling.   Gastrointestinal:  Negative for abdominal pain, constipation, diarrhea, nausea and vomiting.   Genitourinary:  Negative for dysuria and hematuria.   Musculoskeletal:  Negative for arthralgias and joint swelling.   Skin:  Negative for rash.   Neurological:  Negative for dizziness, syncope and headaches.   Psychiatric/Behavioral:  Negative for dysphoric mood. The patient is not nervous/anxious.        Vitals:    07/01/25 0830   BP: 120/85   Pulse: 82   Resp: 14   SpO2: 99%   Weight: 93.6 kg (206 lb 4.8 oz)   Height: 5' 2" (1.575 m)       Wt Readings from Last 3 Encounters:   07/01/25 93.6 kg (206 lb 4.8 oz)   02/10/25 93.1 kg (205 lb 4 oz)   02/05/25 93 kg (205 lb)       Physical Exam  Constitutional:       General: She is not in acute distress.     Appearance: Normal appearance. She is well-developed.   HENT:      Head: Normocephalic and atraumatic.      Right Ear: Tympanic membrane normal.      Left Ear: Tympanic membrane normal.      Nose: Nose normal.      Mouth/Throat:      Mouth: Mucous membranes are moist.      Pharynx: Oropharynx is clear. No posterior oropharyngeal erythema.   Eyes:      Conjunctiva/sclera: Conjunctivae normal.   Neck:      Thyroid: No thyroid mass or thyromegaly.   Cardiovascular:      Rate and Rhythm: Normal rate and regular rhythm.      Pulses: Normal pulses.      Heart sounds: Normal heart sounds. No murmur heard.  Pulmonary:      Effort: Pulmonary effort is normal. No respiratory distress.      Breath sounds: Normal breath sounds.   Abdominal:      General: Bowel sounds are normal. There is no distension.      Palpations: Abdomen is soft.      " Tenderness: There is no abdominal tenderness.   Musculoskeletal:         General: Normal range of motion.      Cervical back: Normal range of motion and neck supple.   Skin:     General: Skin is warm and dry.      Findings: No rash.   Neurological:      General: No focal deficit present.      Mental Status: She is alert and oriented to person, place, and time.   Psychiatric:         Mood and Affect: Mood normal.         Behavior: Behavior normal.         Thought Content: Thought content normal. Thought content does not include homicidal or suicidal ideation.         Judgment: Judgment normal.         Health Maintenance   Topic Date Due    COVID-19 Vaccine (3 - 2024-25 season) 09/01/2024    Influenza Vaccine (1) 09/01/2025    Pap Smear  05/14/2028    TETANUS VACCINE  06/07/2031    RSV Vaccine (Age 60+ and Pregnant patients) (1 - 1-dose 75+ series) 11/21/2071    Hepatitis C Screening  Completed    HIV Screening  Completed    Lipid Panel  Completed    Pneumococcal Vaccines (Age 0-49)  Aged Out     DISCLAIMER: This note was compiled by using a speech recognition dictation system and therefore please be aware that typographical / speech recognition errors can and do occur.  Please contact me if you see any errors specifically.  Consent was obtained for [employer] recording system prior to the visit.            [1]   Social History  Tobacco Use    Smoking status: Never     Passive exposure: Never    Smokeless tobacco: Never   Substance Use Topics    Alcohol use: Yes     Comment: Socially only. Maybe 2x per month    Drug use: Never   [2]   Current Outpatient Medications on File Prior to Visit   Medication Sig Dispense Refill    cetirizine (ZYRTEC) 10 MG tablet TAKE 1 TABLET BY MOUTH EVERY DAY 90 tablet 3    cycloSPORINE (RESTASIS) 0.05 % ophthalmic emulsion Place 1 drop into both eyes 2 (two) times daily.      dextroamphetamine-amphetamine (ADDERALL) 15 mg tablet Take 1 tablet (15 mg total) by mouth 2 (two) times daily. 60  tablet 0    hydrOXYzine pamoate (VISTARIL) 25 MG Cap Take 1 capsule (25 mg total) by mouth every 8 (eight) hours as needed (itching). 30 capsule 0    meloxicam (MOBIC) 15 MG tablet Take 1 tablet (15 mg total) by mouth once daily. 30 tablet 5    metFORMIN (GLUCOPHAGE-XR) 500 MG ER 24hr tablet Take 1 tablet (500 mg total) by mouth 2 (two) times daily with meals. 180 tablet 3    norethindrone-e.estradioL-iron (MINASTRIN 24 FE) 1 mg-20 mcg(24) /75 mg (4) Chew Take 1 tablet by mouth once daily.      ondansetron (ZOFRAN) 8 MG tablet TAKE 1 TABLET(8 MG) BY MOUTH EVERY 8 HOURS AS NEEDED 30 tablet 0    pantoprazole (PROTONIX) 40 MG tablet Take 1 tablet (40 mg total) by mouth once daily. 90 tablet 3    venlafaxine (EFFEXOR XR) 75 MG 24 hr capsule Take 1 capsule (75 mg total) by mouth once daily. 90 capsule 3    venlafaxine (EFFEXOR-XR) 150 MG Cp24 TAKE 1 CAPSULE(150 MG) BY MOUTH DAILY 90 capsule 3    azelastine (ASTELIN) 137 mcg (0.1 %) nasal spray 1 spray (137 mcg total) by Nasal route 2 (two) times daily. for 7 days 30 mL 0    triamcinolone acetonide 0.1% (KENALOG) 0.1 % cream Apply topically 2 (two) times daily. 453.6 g 0     No current facility-administered medications on file prior to visit.

## 2025-07-02 ENCOUNTER — RESULTS FOLLOW-UP (OUTPATIENT)
Dept: FAMILY MEDICINE | Facility: CLINIC | Age: 30
End: 2025-07-02

## 2025-07-14 ENCOUNTER — PATIENT MESSAGE (OUTPATIENT)
Dept: FAMILY MEDICINE | Facility: CLINIC | Age: 30
End: 2025-07-14
Payer: COMMERCIAL

## 2025-07-14 DIAGNOSIS — M25.571 BILATERAL ANKLE PAIN, UNSPECIFIED CHRONICITY: ICD-10-CM

## 2025-07-14 DIAGNOSIS — M25.572 BILATERAL ANKLE PAIN, UNSPECIFIED CHRONICITY: ICD-10-CM

## 2025-07-14 DIAGNOSIS — M25.561 PAIN IN BOTH KNEES, UNSPECIFIED CHRONICITY: Primary | ICD-10-CM

## 2025-07-14 DIAGNOSIS — M25.562 PAIN IN BOTH KNEES, UNSPECIFIED CHRONICITY: Primary | ICD-10-CM

## 2025-07-14 NOTE — TELEPHONE ENCOUNTER
Patient requested that degenerative disc disease is added to her family history pertaining to her mom.

## 2025-07-14 NOTE — TELEPHONE ENCOUNTER
I have signed for the following orders AND/OR meds.  Please call the patient and ask the patient to schedule the testing AND/OR inform about any medications that were sent. Medications have been sent to pharmacy listed below      Orders Placed This Encounter   Procedures    X-ray Knee Ortho Bilateral     Standing Status:   Future     Expected Date:   7/14/2025     Expiration Date:   7/14/2026     May the Radiologist modify the order per protocol to meet the clinical needs of the patient?:   Yes     Release to patient:   Immediate    X-Ray Ankle Complete Bilateral     Standing Status:   Future     Expected Date:   7/14/2025     Expiration Date:   7/14/2026     May the Radiologist modify the order per protocol to meet the clinical needs of the patient?:   Yes     Release to patient:   Immediate              [id number] [place] - [phone] [facility] [address] [facility]  [address]  [place] [address]  Phone: [phone] Fax: [phone]

## 2025-07-15 ENCOUNTER — CLINICAL SUPPORT (OUTPATIENT)
Dept: ALLERGY | Facility: CLINIC | Age: 30
End: 2025-07-15
Payer: COMMERCIAL

## 2025-07-15 DIAGNOSIS — J30.9 CHRONIC ALLERGIC RHINITIS: Primary | ICD-10-CM

## 2025-07-15 PROCEDURE — 95115 IMMUNOTHERAPY ONE INJECTION: CPT | Mod: S$GLB,,, | Performed by: STUDENT IN AN ORGANIZED HEALTH CARE EDUCATION/TRAINING PROGRAM

## 2025-07-15 PROCEDURE — 99999 PR PBB SHADOW E&M-EST. PATIENT-LVL I: CPT | Mod: PBBFAC,,,

## 2025-07-16 ENCOUNTER — HOSPITAL ENCOUNTER (OUTPATIENT)
Dept: RADIOLOGY | Facility: HOSPITAL | Age: 30
Discharge: HOME | End: 2025-07-16
Attending: PHYSICIAN ASSISTANT
Payer: COMMERCIAL

## 2025-07-16 DIAGNOSIS — M25.572 BILATERAL ANKLE PAIN, UNSPECIFIED CHRONICITY: ICD-10-CM

## 2025-07-16 DIAGNOSIS — M25.571 BILATERAL ANKLE PAIN, UNSPECIFIED CHRONICITY: ICD-10-CM

## 2025-07-16 DIAGNOSIS — M25.562 PAIN IN BOTH KNEES, UNSPECIFIED CHRONICITY: ICD-10-CM

## 2025-07-16 DIAGNOSIS — M25.561 PAIN IN BOTH KNEES, UNSPECIFIED CHRONICITY: ICD-10-CM

## 2025-07-16 PROCEDURE — 73610 X-RAY EXAM OF ANKLE: CPT | Mod: 26,,, | Performed by: RADIOLOGY

## 2025-07-16 PROCEDURE — 73610 X-RAY EXAM OF ANKLE: CPT | Mod: TC,50,PO

## 2025-07-16 PROCEDURE — 73562 X-RAY EXAM OF KNEE 3: CPT | Mod: TC,50,PO

## 2025-07-16 PROCEDURE — 73562 X-RAY EXAM OF KNEE 3: CPT | Mod: 26,,, | Performed by: RADIOLOGY

## 2025-07-27 DIAGNOSIS — E88.819 INSULIN RESISTANCE: ICD-10-CM

## 2025-07-28 RX ORDER — METFORMIN HYDROCHLORIDE 500 MG/1
500 TABLET, EXTENDED RELEASE ORAL 2 TIMES DAILY WITH MEALS
Qty: 180 TABLET | Refills: 3 | Status: SHIPPED | OUTPATIENT
Start: 2025-07-28

## 2025-08-12 ENCOUNTER — CLINICAL SUPPORT (OUTPATIENT)
Dept: ALLERGY | Facility: CLINIC | Age: 30
End: 2025-08-12
Payer: COMMERCIAL

## 2025-08-12 DIAGNOSIS — J30.9 CHRONIC ALLERGIC RHINITIS: Primary | ICD-10-CM

## 2025-08-12 PROCEDURE — 95115 IMMUNOTHERAPY ONE INJECTION: CPT | Mod: S$GLB,,, | Performed by: STUDENT IN AN ORGANIZED HEALTH CARE EDUCATION/TRAINING PROGRAM

## 2025-08-12 PROCEDURE — 99999 PR PBB SHADOW E&M-EST. PATIENT-LVL I: CPT | Mod: PBBFAC,,,

## 2025-08-14 ENCOUNTER — OFFICE VISIT (OUTPATIENT)
Dept: PRIMARY CARE CLINIC | Facility: CLINIC | Age: 30
End: 2025-08-14
Payer: COMMERCIAL

## 2025-08-14 VITALS — BODY MASS INDEX: 38.64 KG/M2 | WEIGHT: 210 LBS | HEIGHT: 62 IN

## 2025-08-14 DIAGNOSIS — F98.8 ATTENTION DEFICIT DISORDER (ADD) WITHOUT HYPERACTIVITY: Primary | ICD-10-CM

## 2025-08-14 DIAGNOSIS — Z76.0 ENCOUNTER FOR MEDICATION REFILL: ICD-10-CM

## 2025-08-14 PROCEDURE — 1160F RVW MEDS BY RX/DR IN RCRD: CPT | Mod: CPTII,95,, | Performed by: NURSE PRACTITIONER

## 2025-08-14 PROCEDURE — 1159F MED LIST DOCD IN RCRD: CPT | Mod: CPTII,95,, | Performed by: NURSE PRACTITIONER

## 2025-08-14 PROCEDURE — 98006 SYNCH AUDIO-VIDEO EST MOD 30: CPT | Mod: 95,,, | Performed by: NURSE PRACTITIONER

## 2025-08-14 RX ORDER — DEXTROAMPHETAMINE SACCHARATE, AMPHETAMINE ASPARTATE, DEXTROAMPHETAMINE SULFATE AND AMPHETAMINE SULFATE 3.75; 3.75; 3.75; 3.75 MG/1; MG/1; MG/1; MG/1
15 TABLET ORAL 2 TIMES DAILY
Qty: 60 TABLET | Refills: 0 | Status: SHIPPED | OUTPATIENT
Start: 2025-09-13 | End: 2025-10-13

## 2025-08-14 RX ORDER — DEXTROAMPHETAMINE SACCHARATE, AMPHETAMINE ASPARTATE, DEXTROAMPHETAMINE SULFATE AND AMPHETAMINE SULFATE 3.75; 3.75; 3.75; 3.75 MG/1; MG/1; MG/1; MG/1
15 TABLET ORAL 2 TIMES DAILY
Qty: 60 TABLET | Refills: 0 | Status: SHIPPED | OUTPATIENT
Start: 2025-08-14 | End: 2025-09-13

## 2025-08-14 RX ORDER — DEXTROAMPHETAMINE SACCHARATE, AMPHETAMINE ASPARTATE, DEXTROAMPHETAMINE SULFATE AND AMPHETAMINE SULFATE 3.75; 3.75; 3.75; 3.75 MG/1; MG/1; MG/1; MG/1
15 TABLET ORAL 2 TIMES DAILY
Qty: 60 TABLET | Refills: 0 | Status: SHIPPED | OUTPATIENT
Start: 2025-10-13 | End: 2025-11-12

## 2025-08-20 ENCOUNTER — OFFICE VISIT (OUTPATIENT)
Dept: PRIMARY CARE CLINIC | Facility: CLINIC | Age: 30
End: 2025-08-20
Payer: COMMERCIAL

## 2025-08-20 ENCOUNTER — PATIENT MESSAGE (OUTPATIENT)
Dept: PRIMARY CARE CLINIC | Facility: CLINIC | Age: 30
End: 2025-08-20

## 2025-08-20 ENCOUNTER — TELEPHONE (OUTPATIENT)
Dept: PRIMARY CARE CLINIC | Facility: CLINIC | Age: 30
End: 2025-08-20
Payer: COMMERCIAL

## 2025-08-20 VITALS
BODY MASS INDEX: 38.25 KG/M2 | SYSTOLIC BLOOD PRESSURE: 108 MMHG | TEMPERATURE: 98 F | HEART RATE: 99 BPM | WEIGHT: 207.88 LBS | OXYGEN SATURATION: 99 % | HEIGHT: 62 IN | DIASTOLIC BLOOD PRESSURE: 78 MMHG

## 2025-08-20 DIAGNOSIS — Z13.220 ENCOUNTER FOR LIPID SCREENING FOR CARDIOVASCULAR DISEASE: ICD-10-CM

## 2025-08-20 DIAGNOSIS — Z00.00 ENCOUNTER FOR ANNUAL HEALTH EXAMINATION: ICD-10-CM

## 2025-08-20 DIAGNOSIS — Z13.1 SCREENING FOR DIABETES MELLITUS: ICD-10-CM

## 2025-08-20 DIAGNOSIS — Z13.6 ENCOUNTER FOR LIPID SCREENING FOR CARDIOVASCULAR DISEASE: ICD-10-CM

## 2025-08-20 DIAGNOSIS — R42 EPISODIC LIGHTHEADEDNESS: Primary | ICD-10-CM

## 2025-08-20 DIAGNOSIS — E61.1 IRON DEFICIENCY: ICD-10-CM

## 2025-08-20 DIAGNOSIS — E53.8 B12 DEFICIENCY: ICD-10-CM

## 2025-08-21 ENCOUNTER — LAB VISIT (OUTPATIENT)
Dept: LAB | Facility: HOSPITAL | Age: 30
End: 2025-08-21
Attending: INTERNAL MEDICINE
Payer: COMMERCIAL

## 2025-08-21 DIAGNOSIS — Z13.6 ENCOUNTER FOR LIPID SCREENING FOR CARDIOVASCULAR DISEASE: ICD-10-CM

## 2025-08-21 DIAGNOSIS — E61.1 IRON DEFICIENCY: ICD-10-CM

## 2025-08-21 DIAGNOSIS — Z13.1 SCREENING FOR DIABETES MELLITUS: ICD-10-CM

## 2025-08-21 DIAGNOSIS — Z13.220 ENCOUNTER FOR LIPID SCREENING FOR CARDIOVASCULAR DISEASE: ICD-10-CM

## 2025-08-21 DIAGNOSIS — Z00.00 ENCOUNTER FOR ANNUAL HEALTH EXAMINATION: ICD-10-CM

## 2025-08-21 DIAGNOSIS — E53.8 B12 DEFICIENCY: ICD-10-CM

## 2025-08-21 LAB
25(OH)D3+25(OH)D2 SERPL-MCNC: 43 NG/ML (ref 30–96)
ABSOLUTE EOSINOPHIL (OHS): 0.04 K/UL
ABSOLUTE MONOCYTE (OHS): 0.42 K/UL (ref 0.3–1)
ABSOLUTE NEUTROPHIL COUNT (OHS): 3.71 K/UL (ref 1.8–7.7)
ALBUMIN SERPL BCP-MCNC: 3.8 G/DL (ref 3.5–5.2)
ALP SERPL-CCNC: 65 UNIT/L (ref 40–150)
ALT SERPL W/O P-5'-P-CCNC: 30 UNIT/L (ref 0–55)
ANION GAP (OHS): 10 MMOL/L (ref 8–16)
AST SERPL-CCNC: 32 UNIT/L (ref 0–50)
BASOPHILS # BLD AUTO: 0.02 K/UL
BASOPHILS NFR BLD AUTO: 0.3 %
BILIRUB SERPL-MCNC: 0.4 MG/DL (ref 0.1–1)
BUN SERPL-MCNC: 12 MG/DL (ref 6–20)
CALCIUM SERPL-MCNC: 9 MG/DL (ref 8.7–10.5)
CHLORIDE SERPL-SCNC: 105 MMOL/L (ref 95–110)
CHOLEST SERPL-MCNC: 178 MG/DL (ref 120–199)
CHOLEST/HDLC SERPL: 2.7 {RATIO} (ref 2–5)
CO2 SERPL-SCNC: 23 MMOL/L (ref 23–29)
CREAT SERPL-MCNC: 0.8 MG/DL (ref 0.5–1.4)
EAG (OHS): 105 MG/DL (ref 68–131)
ERYTHROCYTE [DISTWIDTH] IN BLOOD BY AUTOMATED COUNT: 12 % (ref 11.5–14.5)
FERRITIN SERPL-MCNC: 49 NG/ML (ref 20–300)
GFR SERPLBLD CREATININE-BSD FMLA CKD-EPI: >60 ML/MIN/1.73/M2
GLUCOSE SERPL-MCNC: 85 MG/DL (ref 70–110)
HBA1C MFR BLD: 5.3 % (ref 4–5.6)
HCT VFR BLD AUTO: 40.1 % (ref 37–48.5)
HDLC SERPL-MCNC: 67 MG/DL (ref 40–75)
HDLC SERPL: 37.6 % (ref 20–50)
HGB BLD-MCNC: 13 GM/DL (ref 12–16)
IMM GRANULOCYTES # BLD AUTO: 0.01 K/UL (ref 0–0.04)
IMM GRANULOCYTES NFR BLD AUTO: 0.1 % (ref 0–0.5)
IRON SATN MFR SERPL: 21 % (ref 20–50)
IRON SERPL-MCNC: 100 UG/DL (ref 30–160)
LDLC SERPL CALC-MCNC: 95.6 MG/DL (ref 63–159)
LYMPHOCYTES # BLD AUTO: 2.54 K/UL (ref 1–4.8)
MCH RBC QN AUTO: 31.4 PG (ref 27–31)
MCHC RBC AUTO-ENTMCNC: 32.4 G/DL (ref 32–36)
MCV RBC AUTO: 97 FL (ref 82–98)
NONHDLC SERPL-MCNC: 111 MG/DL
NUCLEATED RBC (/100WBC) (OHS): 0 /100 WBC
PLATELET # BLD AUTO: 293 K/UL (ref 150–450)
PMV BLD AUTO: 10.1 FL (ref 9.2–12.9)
POTASSIUM SERPL-SCNC: 4.8 MMOL/L (ref 3.5–5.1)
PROT SERPL-MCNC: 7.1 GM/DL (ref 6–8.4)
RBC # BLD AUTO: 4.14 M/UL (ref 4–5.4)
RELATIVE EOSINOPHIL (OHS): 0.6 %
RELATIVE LYMPHOCYTE (OHS): 37.7 % (ref 18–48)
RELATIVE MONOCYTE (OHS): 6.2 % (ref 4–15)
RELATIVE NEUTROPHIL (OHS): 55.1 % (ref 38–73)
SODIUM SERPL-SCNC: 138 MMOL/L (ref 136–145)
TIBC SERPL-MCNC: 472 UG/DL (ref 250–450)
TRANSFERRIN SERPL-MCNC: 319 MG/DL (ref 200–375)
TRIGL SERPL-MCNC: 77 MG/DL (ref 30–150)
TSH SERPL-ACNC: 1.73 UIU/ML (ref 0.4–4)
VIT B12 SERPL-MCNC: 368 PG/ML (ref 210–950)
WBC # BLD AUTO: 6.74 K/UL (ref 3.9–12.7)

## 2025-08-21 PROCEDURE — 83540 ASSAY OF IRON: CPT

## 2025-08-21 PROCEDURE — 83036 HEMOGLOBIN GLYCOSYLATED A1C: CPT

## 2025-08-21 PROCEDURE — 85025 COMPLETE CBC W/AUTO DIFF WBC: CPT

## 2025-08-21 PROCEDURE — 82607 VITAMIN B-12: CPT

## 2025-08-21 PROCEDURE — 80053 COMPREHEN METABOLIC PANEL: CPT

## 2025-08-21 PROCEDURE — 82728 ASSAY OF FERRITIN: CPT

## 2025-08-21 PROCEDURE — 82306 VITAMIN D 25 HYDROXY: CPT

## 2025-08-21 PROCEDURE — 36415 COLL VENOUS BLD VENIPUNCTURE: CPT | Mod: PO

## 2025-08-21 PROCEDURE — 84443 ASSAY THYROID STIM HORMONE: CPT

## 2025-08-21 PROCEDURE — 80061 LIPID PANEL: CPT

## 2025-08-28 ENCOUNTER — PATIENT MESSAGE (OUTPATIENT)
Dept: ALLERGY | Facility: CLINIC | Age: 30
End: 2025-08-28
Payer: COMMERCIAL

## 2025-08-29 ENCOUNTER — HOSPITAL ENCOUNTER (OUTPATIENT)
Dept: CARDIOLOGY | Facility: HOSPITAL | Age: 30
Discharge: HOME | End: 2025-08-29
Attending: INTERNAL MEDICINE
Payer: COMMERCIAL